# Patient Record
Sex: FEMALE | Race: WHITE | NOT HISPANIC OR LATINO | Employment: FULL TIME | URBAN - METROPOLITAN AREA
[De-identification: names, ages, dates, MRNs, and addresses within clinical notes are randomized per-mention and may not be internally consistent; named-entity substitution may affect disease eponyms.]

---

## 2017-02-02 ENCOUNTER — OFFICE VISIT (OUTPATIENT)
Dept: URGENT CARE | Facility: URGENT CARE | Age: 27
End: 2017-02-02
Payer: COMMERCIAL

## 2017-02-02 ENCOUNTER — VIRTUAL VISIT (OUTPATIENT)
Dept: FAMILY MEDICINE | Facility: OTHER | Age: 27
End: 2017-02-02

## 2017-02-02 VITALS
BODY MASS INDEX: 24.1 KG/M2 | SYSTOLIC BLOOD PRESSURE: 128 MMHG | HEART RATE: 100 BPM | OXYGEN SATURATION: 98 % | HEIGHT: 68 IN | TEMPERATURE: 99.3 F | WEIGHT: 159 LBS | DIASTOLIC BLOOD PRESSURE: 88 MMHG

## 2017-02-02 DIAGNOSIS — R11.2 NON-INTRACTABLE VOMITING WITH NAUSEA, UNSPECIFIED VOMITING TYPE: ICD-10-CM

## 2017-02-02 DIAGNOSIS — A08.4 VIRAL GASTROENTERITIS: Primary | ICD-10-CM

## 2017-02-02 DIAGNOSIS — R07.0 THROAT PAIN: ICD-10-CM

## 2017-02-02 LAB
DEPRECATED S PYO AG THROAT QL EIA: NORMAL
MICRO REPORT STATUS: NORMAL
SPECIMEN SOURCE: NORMAL

## 2017-02-02 PROCEDURE — 99213 OFFICE O/P EST LOW 20 MIN: CPT | Performed by: FAMILY MEDICINE

## 2017-02-02 PROCEDURE — 87081 CULTURE SCREEN ONLY: CPT | Performed by: PHYSICIAN ASSISTANT

## 2017-02-02 PROCEDURE — 87880 STREP A ASSAY W/OPTIC: CPT | Performed by: PHYSICIAN ASSISTANT

## 2017-02-02 RX ORDER — ONDANSETRON 4 MG/1
4-8 TABLET, ORALLY DISINTEGRATING ORAL EVERY 8 HOURS PRN
Qty: 15 TABLET | Refills: 0 | Status: SHIPPED | OUTPATIENT
Start: 2017-02-02 | End: 2018-10-25

## 2017-02-02 NOTE — PROGRESS NOTES
"Date:   Clinician: Trace Gillis  Clinician NPI: [id number]  Patient: Val Gallagher  Patient : 1990  Patient Address: [address] [place] [address] [place] [address]  Patient Phone: [phone]  Visit Protocol: URI  Patient Summary:  Val is a 26 year old ( : 1990 ) female who initiated a Zip for Influenza. When asked the question \"Do you have a [place] primary care physician?\", Val responded \"Yes\".     Her symptoms started suddenly yesterday and consist of dysphagia, chills, sore throat, nausea, loss of appetite, myalgias, malaise, and fever.   She denies vomiting, itchy eyes, hoarse voice, ear pain, dyspnea, post-nasal drainage, rhinitis, facial pain or pressure, petechial or purpuric rash, cough, chest pain, and nasal congestion. She denies a history of facial surgery.   Val has a moderate headache. The headache did not start before her other symptoms and is located on both sides of her head.   She has a mildly painful sore throat. When Val swallows liquids or saliva, she experiences mild pain. The patient denies having white spots on the tonsils similar to a sample strep throat image provided. She has not been exposed to Strep. When asked to feel her neck she denied feeling enlarged lymph nodes. She denies axillary lymphadenopathy.   Her highest temperature was 100.5 degrees Fahrenheit. Her current temperature is 100.0 degrees Fahrenheit. Val has had a fever for 1 - 2 days and used the oral method for measuring her temperature.   She has passed urine in the past 12 hours. She denies rigors.   Val denies having COPD or other chronic lung disease.   Pulse: self-reported pulse rate as: 17 beats in 10 seconds.   Current Temperature (F): 100.0     Weight (in lbs): 153   She states she is not pregnant and denies breastfeeding. She has menstruated in the past month.   Val does not smoke or use smokeless tobacco.   MEDICATIONS:   Patient free text response:  " Trazodone, NuvaRing  , ALLERGIES:  NKDA   Clinician Response:  Dear Val,  Based on the information you have provided, you likely have influenza-like illness. Based on the information you have provided, you likely have acute pharyngitis, or inflammation of your throat.   Unless your are allergic to the over-the-counter medication(s) below, I recommend using:   Acetaminophen (Tylenol), which helps to reduce your discomfort and fever. Take 1-2 pills every 4-6 hours. This is an over-the-counter medication that does not require a prescription.   Ibuprofen. Take 1-3 200mg tablets (200-600mg) every 8 hours to help with the discomfort. Make sure to take the ibuprofen with food. Do not exceed 2400mg in 24 hours. This is an over-the-counter medication that does not require a prescription.   Try the following to help with your throat pain and discomfort:     Use throat lozenges    Gargle with warm salt water (1/4 teaspoon of salt per 8 ounce glass of water).    Suck on frozen items such as Popsicles or ice cubes.     Call 911 or go to the emergency room if you feel that your throat is closing off, you suddenly develop a rash, you are unable to swallow fluids, you are drooling, or you are having difficulty breathing.  Follow up with your primary care clinician if your symptoms are not improving in 3-4 days.   Because your condition is most likely caused by a virus, antibiotics will not help you get better. Inappropriately treating a viral infection with antibiotics may cause harmful side-effects. In fact, antibiotics may make you feel worse.  For more information on why I am not prescribing antibiotics, please watch this video: Antibiotics Won't Help You.   Drink plenty of liquids, especially water and take time to rest your body. This may mean taking a nap or going to bed earlier. Your body is fighting an infection and liquids and rest will improve the pace of recovery. Remember to regularly wash your hands and avoid close  contact with others to prevent spreading your infection.   Diagnosis: Influenza like illness  Diagnosis ICD: J11.1  Diagnosis ICD: 462.0

## 2017-02-02 NOTE — MR AVS SNAPSHOT
After Visit Summary   2/2/2017    Val Gallagher    MRN: [id number]           Patient Information     Date Of Birth          1990        Visit Information        Provider Department      2/2/2017 6:00 PM Aruna Kaminski MD [facility]        Today's Diagnoses     Viral gastroenteritis    -  1     Throat pain         Non-intractable vomiting with nausea, unspecified vomiting type           Care Instructions      Viral Gastroenteritis (Adult)    Gastroenteritis is commonly called the stomach flu. It is most often caused by a virus that affects the stomach and intestinal tract and usually lasts from 2 to 7 days. Common viruses causing gastroenteritis include norovirus, rotavirus, and hepatitis A. Non-viral causes of gastroenteritis include bacteria, parasites, and toxins.  The danger from repeated vomiting or diarrhea is dehydration. This is the loss of too much fluid from the body. When this occurs, body fluids must be replaced. Antibiotics do not help with this illness because it is usually viral.Simple home treatment will be helpful.  Symptoms of viral gastroenteritis may include:    Watery, loose stools    Stomach pain or abdominal cramps    Fever and chills    Nausea and vomiting    Loss of bowel control    Headache  Home care  Gastroenteritis is transmitted by contact with the stool or vomit of an infected person. This can occur from person to person or from contact with a contaminated surface.  Follow these guidelines when caring for yourself at home:    If symptoms are severe, rest at home for the next 24 hours or until you are feeling better.    Wash your hands with soap and water or use alcohol-based  to prevent the spread of infection. Wash your hands after touching anyone who is sick.    Wash your hands or use alcohol-based  after using the toilet and before meals. Clean the toilet after each use.  Remember these tips when preparing  food:    People with diarrhea should not prepare or serve food to others. When preparing foods, wash your hands before and after.    Wash your hands after using cutting boards, countertops, knives, or utensils that have been in contact with raw food.    Keep uncooked meats away from cooked and ready-to-eat foods.  Medicine  You may use acetaminophen or NSAID medicines like ibuprofen or naproxen to control fever unless another medicine was given. If you have chronic liver or kidney disease, talk with your healthcare provider before using these medicines. Also talk with your provider if you've had a stomach ulcer or gastrointestinal bleeding. Don't give aspirin to anyone under 18 years of age who is ill with a fever. It may cause severe liver damage. Don't use NSAIDS is you are already taking one for another condition (like arthritis) or are on aspirin (such as for heart disease or after a stroke).  If medicine for vomiting or diarrhea are prescribed, take these only as directed. Do not take over-the-counter medicines for vomiting or diarrhea unless instructed by your healthcare provider.  Diet  Follow these guidelines for food:    Water and liquids are important so you don't get dehydrated. Drink a small amount at a time or suck on ice chips if you are vomiting.    If you eat, avoid fatty, greasy, spicy, or fried foods.    Don't eat dairy if you have diarrhea. This can make diarrhea worse.    Avoid tobacco, alcohol, and caffeine which may worsen symptoms.  During the first 24 hours (the first full day), follow the diet below:    Beverages. Sports drinks, soft drinks without caffeine, ginger ale, mineral water (plain or flavored), decaffeinated tea and coffee. If you are very dehydrated, sports drinks aren't a good choice. They have too much sugar and not enough electrolytes. In this case, commercially available products called oral rehydration solutions, are best.    Soups. Eat clear broth, consommé, and  bouillon.    Desserts. Eat gelatin, popsicles, and fruit juice bars.  During the next 24 hours (the second day), you may add the following to the above:    Hot cereal, plain toast, bread, rolls, and crackers    Plain noodles, rice, mashed potatoes, chicken noodle or rice soup    Unsweetened canned fruit (avoid pineapple), bananas    Limit fat intake to less than 15 grams per day. Do this by avoiding margarine, butter, oils, mayonnaise, sauces, gravies, fried foods, peanut butter, meat, poultry, and fish.    Limit fiber and avoid raw or cooked vegetables, fresh fruits (except bananas), and bran cereals.    Limit caffeine and chocolate. Don't use spices or seasonings other than salt.    Limit dairy products.    Avoid alcohol.  During the next 24 hours:    Gradually resume a normal diet as you feel better and your symptoms improve.    If at any time it starts getting worse again, go back to clear liquids until you feel better.  Follow-up care  Follow up with your healthcare provider, or as advised. Call your provider if you don't get better within 24 hours or if diarrhea lasts more than a week. Also follow up if you are unable to keep down liquids and get dehydrated. If a stool (diarrhea) sample was taken, call as directed for the results.  Call 911  Call 911 if any of these occur:    Trouble breathing    Chest pain    Confused    Severe drowsiness or trouble awakening    Fainting or loss of consciousness    Rapid heart rate    Seizure    Stiff neck  When to seek medical advice  Call your healthcare provider right away if any of these occur:    Abdominal pain that gets worse    Continued vomiting (unable to keep liquids down)    Frequent diarrhea (more than 5 times a day)    Blood in vomit or stool (black or red color)    Dark urine, reduced urine output, or extreme thirst    Weakness or dizziness    Drowsiness    Fever of 100.4 F (38 C) oral or higher that does not get better with fever medicine    New rash     "[id number] [employer]. [address] [place] [address], [place] [address]. All rights reserved. This information is not intended as a substitute for professional medical care. Always follow your healthcare professional's instructions.              Follow-ups after your visit        Who to contact     If you have questions or need follow up information about today's clinic visit or your schedule please contact [facility] directly at [phone].  Normal or non-critical lab and imaging results will be communicated to you by [employer], letter or phone within 4 business days after the clinic has received the results. If you do not hear from us within 7 days, please contact the clinic through [employer] or phone. If you have a critical or abnormal lab result, we will notify you by phone as soon as possible.  Submit refill requests through [employer] or call your pharmacy and they will forward the refill request to us. Please allow 3 business days for your refill to be completed.          Additional Information About Your Visit        [employer] Information     [employer] gives you secure access to your electronic health record. If you see a primary care provider, you can also send messages to your care team and make appointments. If you have questions, please call your primary care clinic.  If you do not have a primary care provider, please call [phone] and they will assist you.        Care EveryWhere ID     This is your Care EveryWhere ID. This could be used by other organizations to access your [place] medical records  [id number]        Your Vitals Were     Pulse Temperature Height BMI (Body Mass Index) Pulse Oximetry Last Period    100 99.3  F (37.4  C) (Tympanic) 5' 8\" (1.727 m) 24.18 kg/m2 98% 01/05/2017       Blood Pressure from Last 3 Encounters:   02/02/17 128/88   05/10/16 122/82   05/02/16 122/84    Weight from Last 3 Encounters:   02/02/17 159 lb (72.122 kg)   05/10/16 157 lb (71.215 " kg)   05/02/16 155 lb 8 oz (70.534 kg)              We Performed the Following     Beta strep group A culture     Strep, Rapid Screen          Today's Medication Changes          These changes are accurate as of: 2/2/17  7:38 PM.  If you have any questions, ask your nurse or doctor.               Start taking these medicines.        Dose/Directions    ondansetron 4 MG ODT tab   Commonly known as:  ZOFRAN ODT   Used for:  Viral gastroenteritis, Non-intractable vomiting with nausea, unspecified vomiting type   Started by:  Aruna Kaminski MD        Dose:  4-8 mg   Take 1-2 tablets (4-8 mg) by mouth every 8 hours as needed for nausea   Quantity:  15 tablet   Refills:  0            Where to get your medicines      These medications were sent to [facility] [address] [place] [address] AT [address] [address]    Hours:  24-hours Phone:  [phone]    - ondansetron 4 MG ODT tab             Primary Care Provider Office Phone # Fax #    Suzan Montgomery [phone]       [facility] [place] - [address] [address]        Thank you!     Thank you for choosing [facility]  for your care. Our goal is always to provide you with excellent care. Hearing back from our patients is one way we can continue to improve our services. Please take a few minutes to complete the written survey that you may receive in the mail after your visit with us. Thank you!             Your Updated Medication List - Protect others around you: Learn how to safely use, store and throw away your medicines at www.disposemymeds.org.          This list is accurate as of: 2/2/17  7:38 PM.  Always use your most recent med list.                   Brand Name Dispense Instructions for use    amoxicillin-clavulanate 875-125 MG per tablet    AUGMENTIN    20 tablet    Take 1 tablet by mouth 2 times daily        DIPHENHYDRAMINE HCL PO      Take 50 mg by mouth nightly as needed       etonogestrel-ethinyl estradiol 0.12-0.015 MG/24HR vaginal ring    NUVARING    3 each    Place 1 ring every 21 days then remove for 1 week.       fluticasone 50 MCG/ACT spray    FLONASE    16 g    Spray 2 sprays into both nostrils daily       MULTIVITAL PO          naproxen 500 MG tablet    NAPROSYN    30 tablet    Take 1 tablet (500 mg) by mouth 2 times daily as needed for moderate pain       ondansetron 4 MG ODT tab    ZOFRAN ODT    15 tablet    Take 1-2 tablets (4-8 mg) by mouth every 8 hours as needed for nausea       traZODone 50 MG tablet    DESYREL    30 tablet    Take 1 tablet (50 mg) by mouth At Bedtime

## 2017-02-02 NOTE — Clinical Note
[facility]   [address] [place] [address]  [id number]  [phone]      February 2, 2017      To Whom It May Concern,    Val NADIRA Gallagher was seen tonight at [facility].  She was not able to attend work today nor will be able to attend work tomorrow secondary to a medical illness.  Thanks.       Sincerely,      Aruna Swain MD

## 2017-02-03 NOTE — PATIENT INSTRUCTIONS
Viral Gastroenteritis (Adult)    Gastroenteritis is commonly called the stomach flu. It is most often caused by a virus that affects the stomach and intestinal tract and usually lasts from 2 to 7 days. Common viruses causing gastroenteritis include norovirus, rotavirus, and hepatitis A. Non-viral causes of gastroenteritis include bacteria, parasites, and toxins.  The danger from repeated vomiting or diarrhea is dehydration. This is the loss of too much fluid from the body. When this occurs, body fluids must be replaced. Antibiotics do not help with this illness because it is usually viral.Simple home treatment will be helpful.  Symptoms of viral gastroenteritis may include:    Watery, loose stools    Stomach pain or abdominal cramps    Fever and chills    Nausea and vomiting    Loss of bowel control    Headache  Home care  Gastroenteritis is transmitted by contact with the stool or vomit of an infected person. This can occur from person to person or from contact with a contaminated surface.  Follow these guidelines when caring for yourself at home:    If symptoms are severe, rest at home for the next 24 hours or until you are feeling better.    Wash your hands with soap and water or use alcohol-based  to prevent the spread of infection. Wash your hands after touching anyone who is sick.    Wash your hands or use alcohol-based  after using the toilet and before meals. Clean the toilet after each use.  Remember these tips when preparing food:    People with diarrhea should not prepare or serve food to others. When preparing foods, wash your hands before and after.    Wash your hands after using cutting boards, countertops, knives, or utensils that have been in contact with raw food.    Keep uncooked meats away from cooked and ready-to-eat foods.  Medicine  You may use acetaminophen or NSAID medicines like ibuprofen or naproxen to control fever unless another medicine was given. If you have chronic  liver or kidney disease, talk with your healthcare provider before using these medicines. Also talk with your provider if you've had a stomach ulcer or gastrointestinal bleeding. Don't give aspirin to anyone under 18 years of age who is ill with a fever. It may cause severe liver damage. Don't use NSAIDS is you are already taking one for another condition (like arthritis) or are on aspirin (such as for heart disease or after a stroke).  If medicine for vomiting or diarrhea are prescribed, take these only as directed. Do not take over-the-counter medicines for vomiting or diarrhea unless instructed by your healthcare provider.  Diet  Follow these guidelines for food:    Water and liquids are important so you don't get dehydrated. Drink a small amount at a time or suck on ice chips if you are vomiting.    If you eat, avoid fatty, greasy, spicy, or fried foods.    Don't eat dairy if you have diarrhea. This can make diarrhea worse.    Avoid tobacco, alcohol, and caffeine which may worsen symptoms.  During the first 24 hours (the first full day), follow the diet below:    Beverages. Sports drinks, soft drinks without caffeine, ginger ale, mineral water (plain or flavored), decaffeinated tea and coffee. If you are very dehydrated, sports drinks aren't a good choice. They have too much sugar and not enough electrolytes. In this case, commercially available products called oral rehydration solutions, are best.    Soups. Eat clear broth, consommé, and bouillon.    Desserts. Eat gelatin, popsicles, and fruit juice bars.  During the next 24 hours (the second day), you may add the following to the above:    Hot cereal, plain toast, bread, rolls, and crackers    Plain noodles, rice, mashed potatoes, chicken noodle or rice soup    Unsweetened canned fruit (avoid pineapple), bananas    Limit fat intake to less than 15 grams per day. Do this by avoiding margarine, butter, oils, mayonnaise, sauces, gravies, fried foods, peanut  butter, meat, poultry, and fish.    Limit fiber and avoid raw or cooked vegetables, fresh fruits (except bananas), and bran cereals.    Limit caffeine and chocolate. Don't use spices or seasonings other than salt.    Limit dairy products.    Avoid alcohol.  During the next 24 hours:    Gradually resume a normal diet as you feel better and your symptoms improve.    If at any time it starts getting worse again, go back to clear liquids until you feel better.  Follow-up care  Follow up with your healthcare provider, or as advised. Call your provider if you don't get better within 24 hours or if diarrhea lasts more than a week. Also follow up if you are unable to keep down liquids and get dehydrated. If a stool (diarrhea) sample was taken, call as directed for the results.  Call 911  Call 911 if any of these occur:    Trouble breathing    Chest pain    Confused    Severe drowsiness or trouble awakening    Fainting or loss of consciousness    Rapid heart rate    Seizure    Stiff neck  When to seek medical advice  Call your healthcare provider right away if any of these occur:    Abdominal pain that gets worse    Continued vomiting (unable to keep liquids down)    Frequent diarrhea (more than 5 times a day)    Blood in vomit or stool (black or red color)    Dark urine, reduced urine output, or extreme thirst    Weakness or dizziness    Drowsiness    Fever of 100.4 F (38 C) oral or higher that does not get better with fever medicine    New rash    4779-6912 [employer]. [address] [place] [address], [place] [address]. All rights reserved. This information is not intended as a substitute for professional medical care. Always follow your healthcare professional's instructions.

## 2017-02-03 NOTE — PROGRESS NOTES
"(S) Val Gallagher is a 26 year old female with complaint of gastrointestinal symptoms of anorexia, cramping, nausea, vomiting and fever starting yesterday.  Threw up once today.  Normal stools. No blood in stool or emesis.  No recent travels.  No ill contacts.  No recent antibiotics.  No consumption of suspicious foods.  Normal UO.  No abnormal vaginal discharge.  Has been eating ramen noodles and drinking gatorade.  Tmax yesterday was 101.  No h/o abdominal surgeries.    (O) /88 mmHg  Pulse 100  Temp(Src) 99.3  F (37.4  C) (Tympanic)  Ht 5' 8\" (1.727 m)  Wt 159 lb (72.122 kg)  BMI 24.18 kg/m2  SpO2 98%  LMP 01/05/2017   Physical exam reveals the patient appears pale but in NAD. Hydration status: mildly dehydrated. OP is otherwise normal.  Nose: normal. Ears: normal TMs.  Neck: supple, no adenopathy.  Lungs: CTA B.  CV: RRR, normal S1, S2, no murmurs.  Abdomen: +BS, mild tenderness in the LUQ area, no rebound tenderness, no guarding or rigidity, no CVA tenderness.    Labs:   Results for orders placed or performed in visit on 02/02/17   Strep, Rapid Screen   Result Value Ref Range    Specimen Description Throat     Rapid Strep A Screen       NEGATIVE: No Group A streptococcal antigen detected by immunoassay, await   culture report.      Micro Report Status FINAL 02/02/2017       (A) Viral Gastroenteritis and Nausea and vomiting    (P) I have recommended small amounts clear fluids frequently, dilute gatorade, soups, juices, water, BRAT diet and advance diet as tolerated. Pepto-Bismol prn.  Zofran as per orders.  Return office visit if symptoms persist or worsen; I have alerted the patient to call if high fever, dehydration, marked weakness, fainting, increased abdominal pain, blood in stool or vomit.  Aruna Swain MD   "

## 2017-02-03 NOTE — NURSING NOTE
"Chief Complaint   Patient presents with     Urgent Care     Pt in clinic c/o fever, chills, nausea, vomiting, and abdominal pain.     Fever     Sweats     Nausea     Vomiting     Abdominal Pain       Initial /88 mmHg  Pulse 100  Temp(Src) 99.3  F (37.4  C) (Tympanic)  Ht 5' 8\" (1.727 m)  Wt 159 lb (72.122 kg)  BMI 24.18 kg/m2  SpO2 98%  LMP 01/05/2017 Estimated body mass index is 24.18 kg/(m^2) as calculated from the following:    Height as of this encounter: 5' 8\" (1.727 m).    Weight as of this encounter: 159 lb (72.122 kg).  BP completed using cuff size: regular  Tracey Grande/ MA    "

## 2017-02-04 LAB
BACTERIA SPEC CULT: NORMAL
MICRO REPORT STATUS: NORMAL
SPECIMEN SOURCE: NORMAL

## 2017-04-02 ENCOUNTER — OFFICE VISIT (OUTPATIENT)
Dept: URGENT CARE | Facility: URGENT CARE | Age: 27
End: 2017-04-02
Payer: COMMERCIAL

## 2017-04-02 VITALS
HEIGHT: 68 IN | WEIGHT: 155 LBS | HEART RATE: 104 BPM | TEMPERATURE: 98.7 F | DIASTOLIC BLOOD PRESSURE: 76 MMHG | BODY MASS INDEX: 23.49 KG/M2 | OXYGEN SATURATION: 98 % | SYSTOLIC BLOOD PRESSURE: 102 MMHG

## 2017-04-02 DIAGNOSIS — M79.622 PAIN OF LEFT UPPER ARM: Primary | ICD-10-CM

## 2017-04-02 PROCEDURE — 99213 OFFICE O/P EST LOW 20 MIN: CPT | Performed by: PHYSICIAN ASSISTANT

## 2017-04-02 PROCEDURE — 93000 ELECTROCARDIOGRAM COMPLETE: CPT | Performed by: PHYSICIAN ASSISTANT

## 2017-04-02 ASSESSMENT — ENCOUNTER SYMPTOMS
DIARRHEA: 0
HEADACHES: 0
ABDOMINAL PAIN: 0
SHORTNESS OF BREATH: 0
CHILLS: 0
NAUSEA: 0
FOCAL WEAKNESS: 0
VOMITING: 0
FEVER: 0

## 2017-04-02 NOTE — MR AVS SNAPSHOT
After Visit Summary   4/2/2017    Val Gallagher    MRN: [id number]           Patient Information     Date Of Birth          1990        Visit Information        Provider Department      4/2/2017 7:30 PM Lena Gallo PA-C [facility]        Today's Diagnoses     Pain of left upper arm    -  1       Follow-ups after your visit        Follow-up notes from your care team     Return if symptoms worsen or fail to improve.      Your next 10 appointments already scheduled     Apr 06, 2017  9:00 AM CDT   [facility] with Gaby Moscoso MD   [facility] ([facility])    [address] [place] [id number] [phone]              Who to contact     If you have questions or need follow up information about today's clinic visit or your schedule please contact [facility] directly at [phone].  Normal or non-critical lab and imaging results will be communicated to you by [employer], letter or phone within 4 business days after the clinic has received the results. If you do not hear from us within 7 days, please contact the clinic through [employer] or phone. If you have a critical or abnormal lab result, we will notify you by phone as soon as possible.  Submit refill requests through [employer] or call your pharmacy and they will forward the refill request to us. Please allow 3 business days for your refill to be completed.          Additional Information About Your Visit        MyChart Information     Y[employer]all gives you secure access to your electronic health record. If you see a primary care provider, you can also send messages to your care team and make appointments. If you have questions, please call your primary care clinic.  If you do not have a primary care provider, please call [phone] and they will assist you.        Care EveryWhere ID     This is your Care EveryWhere ID. This could be  "used by other organizations to access your [facility] medical records  [id number]        Your Vitals Were     Pulse Temperature Height Pulse Oximetry BMI (Body Mass Index)       104 98.7  F (37.1  C) (Oral) 5' 8\" (1.727 m) 98% 23.57 kg/m2        Blood Pressure from Last 3 Encounters:   04/02/17 102/76   02/02/17 128/88   05/10/16 122/82    Weight from Last 3 Encounters:   04/02/17 155 lb (70.3 kg)   02/02/17 159 lb (72.1 kg)   05/10/16 157 lb (71.2 kg)              We Performed the Following     EKG 12-lead complete w/read - Clinics          Today's Medication Changes          These changes are accurate as of: 4/2/17  8:41 PM.  If you have any questions, ask your nurse or doctor.               Stop taking these medicines if you haven't already. Please contact your care team if you have questions.     amoxicillin-clavulanate 875-125 MG per tablet   Commonly known as:  AUGMENTIN   Stopped by:  Lean Gallo PA-C                    Primary Care Provider Office Phone # Fax #    Suzan Montgomery [phone]       [place] [id number]        Thank you!     Thank you for choosing [facility]  for your care. Our goal is always to provide you with excellent care. Hearing back from our patients is one way we can continue to improve our services. Please take a few minutes to complete the written survey that you may receive in the mail after your visit with us. Thank you!             Your Updated Medication List - Protect others around you: Learn how to safely use, store and throw away your medicines at www.disposemymeds.org.          This list is accurate as of: 4/2/17  8:41 PM.  Always use your most recent med list.                   Brand Name Dispense Instructions for use    DIPHENHYDRAMINE HCL PO      Take 50 mg by mouth nightly as needed       etonogestrel-ethinyl estradiol 0.12-0.015 MG/24HR vaginal ring    NUVARING    3 each    Place 1 " ring every 21 days then remove for 1 week.       fluticasone 50 MCG/ACT spray    FLONASE    16 g    Spray 2 sprays into both nostrils daily       MULTIVITAL PO          naproxen 500 MG tablet    NAPROSYN    30 tablet    Take 1 tablet (500 mg) by mouth 2 times daily as needed for moderate pain       ondansetron 4 MG ODT tab    ZOFRAN ODT    15 tablet    Take 1-2 tablets (4-8 mg) by mouth every 8 hours as needed for nausea       traZODone 50 MG tablet    DESYREL    30 tablet    Take 1 tablet (50 mg) by mouth At Bedtime

## 2017-04-03 NOTE — NURSING NOTE
"Chief Complaint   Patient presents with     Urgent Care     Pain     left sided breast pain started 28 hours ago, radiates up into shoulder. Comes and goes,        Initial /76  Pulse 104  Temp 98.7  F (37.1  C) (Oral)  Ht 5' 8\" (1.727 m)  Wt 155 lb (70.3 kg)  SpO2 98%  BMI 23.57 kg/m2 Estimated body mass index is 23.57 kg/(m^2) as calculated from the following:    Height as of this encounter: 5' 8\" (1.727 m).    Weight as of this encounter: 155 lb (70.3 kg).  Medication Reconciliation: complete  "

## 2017-04-03 NOTE — PROGRESS NOTES
HPI  April 2, 2017    HPI: Val Gallagher is a 26 year old female who complains of moderate intermittent L sided breast pain which radiates into L axilla and L upper arm onset yesterday. Pain occurs once every few hours and lasts approx 30 seconds. Pain is sharp in nature. No known aggravating or alleviating factors. No injury or trauma. No treatments tried. Denies fever/chills, palpitations, cough, congestion, urinary sx, constipation, nipple bleeding/discharge, HA, CP, SOB, abd pain, N/V/D, numbness, rash, or any other symptoms.     Past Medical History:   Diagnosis Date     CARDIOVASCULAR SCREENING; LDL GOAL LESS THAN 160      LSIL (low grade squamous intraepithelial lesion) on Pap smear 8/2013    age 22     Other kyphoscoliosis and scoliosis     thoracolumbar     Past Surgical History:   Procedure Laterality Date     NO HISTORY OF SURGERY       Social History   Substance Use Topics     Smoking status: Never Smoker     Smokeless tobacco: Never Used     Alcohol use 0.0 oz/week     0 Standard drinks or equivalent per week      Comment:  1 to 4 max per week       Problem list, Medication list, Allergies, and Medical/Social/Surgical histories reviewed in [facility] and updated as appropriate.      Review of Systems   Constitutional: Negative for chills and fever.   Respiratory: Negative for shortness of breath.    Cardiovascular: Negative for chest pain.   Gastrointestinal: Negative for abdominal pain, diarrhea, nausea and vomiting.   Musculoskeletal:        L breast/axilla/upper arm pain   Skin: Negative for rash.   Neurological: Negative for focal weakness and headaches.   All other systems reviewed and are negative.        Physical Exam   Constitutional: She is oriented to person, place, and time and well-developed, well-nourished, and in no distress.   HENT:   Head: Normocephalic and atraumatic.   Cardiovascular: Normal rate, regular rhythm and normal heart sounds.    Pulmonary/Chest: Effort normal and breath sounds  "normal. She exhibits no tenderness. Left breast exhibits no mass, no nipple discharge, no skin change and no tenderness.   Musculoskeletal: Normal range of motion.        Left shoulder: Normal.   Neurological: She is alert and oriented to person, place, and time. Gait normal.   Skin: Skin is warm and dry.   Nursing note and vitals reviewed.      Vital Signs  /76  Pulse 104  Temp 98.7  F (37.1  C) (Oral)  Ht 5' 8\" (1.727 m)  Wt 155 lb (70.3 kg)  SpO2 98%  BMI 23.57 kg/m2     Diagnostic Test Results:  none   EKG - appears normal, NSR, normal axis, normal intervals, no acute ST/T changes c/w ischemia, no LVH by voltage criteria, unchanged from previous tracings    ASSESSMENT/PLAN      ICD-10-CM    1. Pain of left upper arm M79.622 EKG 12-lead complete w/read - Clinics      Lungs CTAB, no tenderness on exam. No SOB. EKG normal. Unknown etiology for pain but no signs of acute cardiac or respiratory cause. F/u with PCP tomorrow.      I have discussed any lab or imaging results, the patient's diagnosis, and my plan of treatment with the patient and/or family. Patient is aware to come back in if with worsening symptoms or if no relief despite treatment plan.  Patient voiced understanding and had no further questions.       Follow Up: Return if symptoms worsen or fail to improve.    CHIQUIS Trujillo, PANavdeepC  [facility]      "

## 2017-05-02 ENCOUNTER — OFFICE VISIT (OUTPATIENT)
Dept: FAMILY MEDICINE | Facility: CLINIC | Age: 27
End: 2017-05-02
Payer: COMMERCIAL

## 2017-05-02 VITALS
RESPIRATION RATE: 12 BRPM | BODY MASS INDEX: 32.54 KG/M2 | OXYGEN SATURATION: 99 % | HEART RATE: 80 BPM | DIASTOLIC BLOOD PRESSURE: 72 MMHG | SYSTOLIC BLOOD PRESSURE: 124 MMHG | HEIGHT: 58 IN | WEIGHT: 155 LBS | TEMPERATURE: 98.3 F

## 2017-05-02 DIAGNOSIS — Z11.3 SCREENING EXAMINATION FOR VENEREAL DISEASE: ICD-10-CM

## 2017-05-02 DIAGNOSIS — N94.6 DYSMENORRHEA: ICD-10-CM

## 2017-05-02 DIAGNOSIS — G47.00 INSOMNIA, UNSPECIFIED TYPE: ICD-10-CM

## 2017-05-02 DIAGNOSIS — Z00.00 ROUTINE GENERAL MEDICAL EXAMINATION AT A HEALTH CARE FACILITY: Primary | ICD-10-CM

## 2017-05-02 DIAGNOSIS — Z12.4 SCREENING FOR MALIGNANT NEOPLASM OF CERVIX: ICD-10-CM

## 2017-05-02 PROCEDURE — 99395 PREV VISIT EST AGE 18-39: CPT | Performed by: FAMILY MEDICINE

## 2017-05-02 PROCEDURE — 87491 CHLMYD TRACH DNA AMP PROBE: CPT | Performed by: FAMILY MEDICINE

## 2017-05-02 PROCEDURE — 87591 N.GONORRHOEAE DNA AMP PROB: CPT | Performed by: FAMILY MEDICINE

## 2017-05-02 PROCEDURE — G0145 SCR C/V CYTO,THINLAYER,RESCR: HCPCS | Performed by: FAMILY MEDICINE

## 2017-05-02 RX ORDER — ETONOGESTREL AND ETHINYL ESTRADIOL VAGINAL RING .015; .12 MG/D; MG/D
RING VAGINAL
Qty: 3 EACH | Refills: 3 | Status: SHIPPED | OUTPATIENT
Start: 2017-05-02 | End: 2018-04-23

## 2017-05-02 RX ORDER — TRAZODONE HYDROCHLORIDE 50 MG/1
50 TABLET, FILM COATED ORAL AT BEDTIME
Qty: 30 TABLET | Refills: 3 | Status: SHIPPED | OUTPATIENT
Start: 2017-05-02 | End: 2017-12-04

## 2017-05-02 NOTE — NURSING NOTE
"Chief Complaint   Patient presents with     Physical     with PAP       Initial /72 (BP Location: Left arm, Patient Position: Chair, Cuff Size: Adult Regular)  Pulse 80  Temp 98.3  F (36.8  C) (Oral)  Resp 12  Ht 4' 9.5\" (1.461 m)  Wt 155 lb (70.3 kg)  LMP 05/01/2017 (Exact Date)  SpO2 99%  BMI 32.96 kg/m2 Estimated body mass index is 32.96 kg/(m^2) as calculated from the following:    Height as of this encounter: 4' 9.5\" (1.461 m).    Weight as of this encounter: 155 lb (70.3 kg).  Medication Reconciliation: complete     Danica Eng MA      "

## 2017-05-02 NOTE — PROGRESS NOTES
SUBJECTIVE:     CC: Val Gallagher is a 26 year old woman who presents for preventive health visit.     Physical   Annual:     Getting at least 3 servings of Calcium per day::  Yes    Bi-annual eye exam::  NO    Dental care twice a year::  Yes    Sleep apnea or symptoms of sleep apnea::  None    Diet::  Carbohydrate counting    Frequency of exercise::  2-3 days/week    Duration of exercise::  15-30 minutes    Taking medications regularly::  Yes    Medication side effects::  None    Additional concerns today::  YES    Chest pain: About a month ago, Val went to the  due to a pain behind the right breast which radiates to the back. The pain is sharp and comes suddenly, about three times a week, and in brief episodes. There's no specific trigger, no known alleviating factors. Thought to be musculoskeletal at the  visit. Reports normal EKG there. Denies exertional symptoms.     Anxiety: Val is working on her master's and her computer crashed during finals. She also has nightmares about her old job at a long-term which wake her up. She takes trazadone to sleep, taking 25 mg per night. She wonders if she can take 50mg nightly.     She denies concern about STI. She has been with her partner for 4 years. Does need nuvaring refill. Tolerating it well. No spotting between periods.     Today's PHQ-2 Score:   PHQ-2 ( 1999 Pfizer) 5/1/2017   Little interest or pleasure in doing things Not at all   Feeling down, depressed or hopeless Not at all   PHQ-2 Score 0       Abuse: Current or Past(Physical, Sexual or Emotional)- No  Do you feel safe in your environment - Yes    Social History   Substance Use Topics     Smoking status: Never Smoker     Smokeless tobacco: Never Used     Alcohol use 0.0 oz/week     0 Standard drinks or equivalent per week      Comment:  1 to 4 max per week     The patient does not drink >3 drinks per day nor >7 drinks per week.    Recent Labs   Lab Test  07/31/09   1114   CHOL  170   HDL  50   LDL   111   TRIG  45   [facility]       Reviewed orders with patient.  Reviewed health maintenance and updated orders accordingly - Yes    Mammo Decision Support:  Mammogram not appropriate for this patient based on age.    Pertinent mammograms are reviewed under the imaging tab.  History of abnormal Pap smear:   YES - other categories - see link Cervical Cytology Screening Guidelines  Last 3 Pap Results:   PAP (no units)   Date Value   2016 NIL   2013 LSIL (A)     Reviewed and updated as needed this visit by clinical staff  Tobacco  Allergies  Med Hx  Surg Hx  Fam Hx  Soc Hx        Reviewed and updated as needed this visit by Provider        Past Medical History:   Diagnosis Date     CARDIOVASCULAR SCREENING; LDL GOAL LESS THAN 160      LSIL (low grade squamous intraepithelial lesion) on Pap smear 2013    age 22     Other kyphoscoliosis and scoliosis     thoracolumbar      Past Surgical History:   Procedure Laterality Date     NO HISTORY OF SURGERY       Obstetric History       T0      TAB0   SAB0   E0   M0   L0           ROS:  10 point ROS is negative except as noted above.       BP Readings from Last 3 Encounters:   17 124/72   17 102/76   17 128/88    Wt Readings from Last 3 Encounters:   17 70.3 kg (155 lb)   17 70.3 kg (155 lb)   17 72.1 kg (159 lb)         Patient Active Problem List   Diagnosis     Scoliosis (and kyphoscoliosis), idiopathic     Dysmenorrhea      frequent menses     CARDIOVASCULAR SCREENING; LDL GOAL LESS THAN 160     LSIL (low grade squamous intraepithelial lesion) on Pap smear     Past Surgical History:   Procedure Laterality Date     NO HISTORY OF SURGERY         Social History   Substance Use Topics     Smoking status: Never Smoker     Smokeless tobacco: Never Used     Alcohol use 0.0 oz/week     0 Standard drinks or equivalent per week      Comment:  1 to 4 max per week     Family History   Problem Relation Age of  "Onset     Family History Negative Mother      Sleep Disorder Mother      Obesity Father      DIABETES Paternal Grandmother          Current Outpatient Prescriptions   Medication Sig Dispense Refill     traZODone (DESYREL) 50 MG tablet Take 1 tablet (50 mg) by mouth At Bedtime 30 tablet 3     etonogestrel-ethinyl estradiol (NUVARING) 0.12-0.015 MG/24HR vaginal ring Place 1 ring every 21 days then remove for 1 week. 3 each 3     ondansetron (ZOFRAN ODT) 4 MG ODT tab Take 1-2 tablets (4-8 mg) by mouth every 8 hours as needed for nausea (Patient not taking: Reported on 5/2/2017) 15 tablet 0     naproxen (NAPROSYN) 500 MG tablet Take 1 tablet (500 mg) by mouth 2 times daily as needed for moderate pain (Patient not taking: Reported on 5/2/2017) 30 tablet 1     fluticasone (FLONASE) 50 MCG/ACT nasal spray Spray 2 sprays into both nostrils daily (Patient not taking: Reported on 5/2/2017) 16 g 3     DIPHENHYDRAMINE HCL PO Take 50 mg by mouth nightly as needed Reported on 5/2/2017       [DISCONTINUED] etonogestrel-ethinyl estradiol (NUVARING) 0.12-0.015 MG/24HR vaginal ring Place 1 ring every 21 days then remove for 1 week. 3 each 3     [DISCONTINUED] traZODone (DESYREL) 50 MG tablet Take 1 tablet (50 mg) by mouth At Bedtime 30 tablet 5     Multiple Vitamins-Minerals (MULTIVITAL PO) Reported on 5/2/2017       No Known Allergies  Recent Labs   Lab Test  07/31/09   1114   LDL  111   HDL  50   TRIG  45      This document serves as a record of the services and decisions personally performed and made by Gaby Moscoso MD. It was created on her behalf by Isela Mallory, a trained medical scribe. The creation of this document is based on the provider's statements to the medical scribe.  Isela Mallory May 2, 2017 8:38 AM    OBJECTIVE:     /72 (BP Location: Left arm, Patient Position: Chair, Cuff Size: Adult Regular)  Pulse 80  Temp 98.3  F (36.8  C) (Oral)  Resp 12  Ht 1.461 m (4' 9.5\")  Wt 70.3 kg (155 lb)  LMP " 05/01/2017 (Exact Date)  SpO2 99%  BMI 32.96 kg/m2  EXAM:  GENERAL: healthy, alert and no distress  EYES: Eyes grossly normal to inspection, PERRL and conjunctivae and sclerae normal  HENT: ear canals and TM's normal, nose and mouth without ulcers or lesions  NECK: no adenopathy, no asymmetry, masses, or scars and thyroid normal to palpation  RESP: lungs clear to auscultation - no rales, rhonchi or wheezes  BREAST: normal without masses, tenderness or nipple discharge and no palpable axillary masses or adenopathy  CV: regular rate and rhythm, normal S1 S2, no S3 or S4, no murmur, click or rub, no peripheral edema and peripheral pulses strong  ABDOMEN: soft, nontender, no hepatosplenomegaly, no masses and bowel sounds normal   (female): normal female external genitalia, normal urethral meatus, vaginal mucosa pink, moist, well rugated, and normal cervix/adnexa/uterus without masses or discharge  MS: no gross musculoskeletal defects noted, no edema  SKIN: no suspicious lesions or rashes  NEURO: Normal strength and tone, mentation intact and speech normal  PSYCH: mentation appears normal, affect normal/bright  LYMPH: no cervical, supraclavicular, axillary, or inguinal adenopathy    ASSESSMENT/PLAN:     1. Routine general medical examination at a health care facility  Pap today     2. Screening examination for venereal disease     - NEISSERIA GONORRHOEA PCR  - CHLAMYDIA TRACHOMATIS PCR    3. Screening for malignant neoplasm of cervix     - Pap imaged thin layer screen reflex to HPV if ASCUS - recommend age 25 - 29    4. Dysmenorrhea   doing well on nuvaring  - etonogestrel-ethinyl estradiol (NUVARING) 0.12-0.015 MG/24HR vaginal ring; Place 1 ring every 21 days then remove for 1 week.  Dispense: 3 each; Refill: 3    5. Insomnia, unspecified type  She can increase her dose of trazodone to 50mg nightly (which is what the rx was written for previously as well). Suggested trying meditation technique such as progressive  "muscle relaxation for middle of the night awakenings.   - traZODone (DESYREL) 50 MG tablet; Take 1 tablet (50 mg) by mouth At Bedtime  Dispense: 30 tablet; Refill: 3    COUNSELING:  Reviewed preventive health counseling, as reflected in patient instructions     reports that she has never smoked. She has never used smokeless tobacco.    Estimated body mass index is 32.96 kg/(m^2) as calculated from the following:    Height as of this encounter: 1.461 m (4' 9.5\").    Weight as of this encounter: 70.3 kg (155 lb).       Counseling Resources:  ATP IV Guidelines  Pooled Cohorts Equation Calculator  Breast Cancer Risk Calculator  FRAX Risk Assessment  ICSI Preventive Guidelines  Dietary Guidelines for Americans, 2010  [employer]'s MyPlate  ASA Prophylaxis  Lung CA Screening    The information in this document, created by the medical scribe for me, accurately reflects the services I personally performed and the decisions made by me. I have reviewed and approved this document for accuracy prior to leaving the patient care area.  5/2/2017 8:37 AM         Gaby Moscoso MD  [facility]  Answers for HPI/ROS submitted by the patient on 5/1/2017   Q1: Little interest or pleasure in doing things: 0=Not at all  Q2: Feeling down, depressed or hopeless: 0=Not at all  PHQ-2 Score: 0    "

## 2017-05-02 NOTE — MR AVS SNAPSHOT
After Visit Summary   5/2/2017    Val Gallagher    MRN: [id number]           Patient Information     Date Of Birth          1990        Visit Information        Provider Department      5/2/2017 8:00 AM Gaby Moscoso MD [facility]        Today's Diagnoses     Routine general medical examination at a health care facility    -  1    Screening examination for venereal disease        Screening for malignant neoplasm of cervix        Dysmenorrhea        Insomnia, unspecified type          Care Instructions      Preventive Health Recommendations  Female Ages 18 to 25     Yearly exam:     See your health care provider every year in order to  o Review health changes.   o Discuss preventive care.    o Review your medicines if your doctor has prescribed any.      You should be tested each year for STDs (sexually transmitted diseases).       After age 20, talk to your provider about how often you should have cholesterol testing.      Starting at age 21, get a Pap test every three years. If you have an abnormal result, your doctor may have you test more often.      If you are at risk for diabetes, you should have a diabetes test (fasting glucose).     Shots:     Get a flu shot each year.     Get a tetanus shot every 10 years.     Consider getting the shot (vaccine) that prevents cervical cancer (Gardasil).    Nutrition:     Eat at least 5 servings of fruits and vegetables each day.    Eat whole-grain bread, whole-wheat pasta and brown rice instead of white grains and rice.    Talk to your provider about Calcium and Vitamin D.     Lifestyle    Exercise at least 150 minutes a week each week (30 minutes a day, 5 days a week). This will help you control your weight and prevent disease.    Limit alcohol to one drink per day.    No smoking.     Wear sunscreen to prevent skin cancer.    See your dentist every six months for an exam and cleaning.        Follow-ups after your visit        Who to  "contact     If you have questions or need follow up information about today's clinic visit or your schedule please contact [facility] directly at [phone].  Normal or non-critical lab and imaging results will be communicated to you by MyChart, letter or phone within 4 business days after the clinic has received the results. If you do not hear from us within 7 days, please contact the clinic through [employer] or phone. If you have a critical or abnormal lab result, we will notify you by phone as soon as possible.  Submit refill requests through [employer] or call your pharmacy and they will forward the refill request to us. Please allow 3 business days for your refill to be completed.          Additional Information About Your Visit        [employer] Information     [employer] gives you secure access to your electronic health record. If you see a primary care provider, you can also send messages to your care team and make appointments. If you have questions, please call your primary care clinic.  If you do not have a primary care provider, please call [phone] and they will assist you.        Care EveryWhere ID     This is your Care EveryWhere ID. This could be used by other organizations to access your [place] medical records  [id number]        Your Vitals Were     Pulse Temperature Respirations Height Last Period Pulse Oximetry    80 98.3  F (36.8  C) (Oral) 12 4' 9.5\" (1.461 m) 05/01/2017 (Exact Date) 99%    BMI (Body Mass Index)                   32.96 kg/m2            Blood Pressure from Last 3 Encounters:   05/02/17 124/72   04/02/17 102/76   02/02/17 128/88    Weight from Last 3 Encounters:   05/02/17 155 lb (70.3 kg)   04/02/17 155 lb (70.3 kg)   02/02/17 159 lb (72.1 kg)              We Performed the Following     CHLAMYDIA TRACHOMATIS PCR     NEISSERIA GONORRHOEA PCR     Pap imaged thin layer screen reflex to HPV if ASCUS - recommend age 25 - 29          Where to get your medicines      These " medications were sent to [place], [place] - [address]  [address] [facility] [address]     Phone:  [phone]     etonogestrel-ethinyl estradiol 0.12-0.015 MG/24HR vaginal ring    traZODone 50 MG tablet          Primary Care Provider Office Phone # Fax #    Suzan Montgomery [phone]       [facility] [address]  [facility] [address]        Thank you!     Thank you for choosing [facility]  for your care. Our goal is always to provide you with excellent care. Hearing back from our patients is one way we can continue to improve our services. Please take a few minutes to complete the written survey that you may receive in the mail after your visit with us. Thank you!             Your Updated Medication List - Protect others around you: Learn how to safely use, store and throw away your medicines at www.disposemymeds.org.          This list is accurate as of: 5/2/17  8:31 AM.  Always use your most recent med list.                   Brand Name Dispense Instructions for use    DIPHENHYDRAMINE HCL PO      Take 50 mg by mouth nightly as needed Reported on 5/2/2017       etonogestrel-ethinyl estradiol 0.12-0.015 MG/24HR vaginal ring    NUVARING    3 each    Place 1 ring every 21 days then remove for 1 week.       fluticasone 50 MCG/ACT spray    FLONASE    16 g    Spray 2 sprays into both nostrils daily       MULTIVITAL PO      Reported on 5/2/2017       naproxen 500 MG tablet    NAPROSYN    30 tablet    Take 1 tablet (500 mg) by mouth 2 times daily as needed for moderate pain       ondansetron 4 MG ODT tab    ZOFRAN ODT    15 tablet    Take 1-2 tablets (4-8 mg) by mouth every 8 hours as needed for nausea       traZODone 50 MG tablet    DESYREL    30 tablet    Take 1 tablet (50 mg) by mouth At Bedtime

## 2017-05-03 LAB
C TRACH DNA SPEC QL NAA+PROBE: NORMAL
COPATH REPORT: NORMAL
N GONORRHOEA DNA SPEC QL NAA+PROBE: NORMAL
PAP: NORMAL
SPECIMEN SOURCE: NORMAL
SPECIMEN SOURCE: NORMAL

## 2017-05-04 NOTE — PROGRESS NOTES
Excellent! Please call or sent a [employer] message if you have any questions. Gaby Moscoso M.D.

## 2017-06-21 ENCOUNTER — OFFICE VISIT (OUTPATIENT)
Dept: FAMILY MEDICINE | Facility: CLINIC | Age: 27
End: 2017-06-21
Payer: COMMERCIAL

## 2017-06-21 VITALS
OXYGEN SATURATION: 99 % | WEIGHT: 153 LBS | DIASTOLIC BLOOD PRESSURE: 78 MMHG | RESPIRATION RATE: 16 BRPM | TEMPERATURE: 98.7 F | SYSTOLIC BLOOD PRESSURE: 122 MMHG | HEART RATE: 93 BPM | BODY MASS INDEX: 32.54 KG/M2

## 2017-06-21 DIAGNOSIS — Z13.220 SCREENING FOR LIPID DISORDERS: ICD-10-CM

## 2017-06-21 DIAGNOSIS — Z13.1 SCREENING FOR DIABETES MELLITUS: ICD-10-CM

## 2017-06-21 DIAGNOSIS — R07.89 ATYPICAL CHEST PAIN: Primary | ICD-10-CM

## 2017-06-21 PROCEDURE — 80061 LIPID PANEL: CPT | Performed by: FAMILY MEDICINE

## 2017-06-21 PROCEDURE — 36415 COLL VENOUS BLD VENIPUNCTURE: CPT | Performed by: FAMILY MEDICINE

## 2017-06-21 PROCEDURE — 82947 ASSAY GLUCOSE BLOOD QUANT: CPT | Performed by: FAMILY MEDICINE

## 2017-06-21 PROCEDURE — 99214 OFFICE O/P EST MOD 30 MIN: CPT | Performed by: FAMILY MEDICINE

## 2017-06-21 NOTE — PROGRESS NOTES
SUBJECTIVE:                                                    Val Gallagher is a 26 year old female who presents to clinic today for the following health issues:    Three months ago pt started experiencing chest and left arm pain. She had an EKG which was normal. Pain starts in and under the breast and sometimes radiates to the arm. Pain lasts for 1-2 minutes, moderate, stabbing, no aggravating or relieving factors. No nausea, vomiting, heartburn, shortness of breath, dizziness, palpitations, neck pain, breast lump/pain, numbness/tingling of extremity. Paternal grandfather had bypass surgery. Unsure of Dads history, mom doesn't have HTN/CAD/HLD. Pt smoked on and off in college, no smoking for three years. Pt does mild exercise three times/week. No exertional pain during exercise. Pt goes to therapy every other week for excessive drinking and issues with her Dad. Now she goes for maintenance. Therapist noticed anxious tendencies but no anxiety. No recent URI's.     Problem list and histories reviewed & adjusted, as indicated.  Additional history: as documented    Labs reviewed in EPIC    Reviewed and updated as needed this visit by clinical staff       Reviewed and updated as needed this visit by Provider         ROS:  Constitutional, HEENT, cardiovascular, pulmonary, gi and gu systems are negative, except as otherwise noted.    OBJECTIVE:                                                    /78 (BP Location: Right arm, Patient Position: Chair, Cuff Size: Adult Regular)  Pulse 93  Temp 98.7  F (37.1  C) (Tympanic)  Resp 16  Wt 153 lb (69.4 kg)  LMP 05/30/2017 (Approximate)  SpO2 99%  BMI 32.54 kg/m2  Body mass index is 32.54 kg/(m^2).  GENERAL: healthy, alert and no distress  RESP: lungs clear to auscultation - no rales, rhonchi or wheezes  CV: regular rate and rhythm, normal S1 S2, no S3 or S4  MS: no chest wall tenderness     Diagnostic Test Results:  none      ASSESSMENT/PLAN:                                                       ## Atypical chest pain  - Pt has been experiencing intermittent chest pain. Normal EKG at urgent care. Mom with no significant cardiac history, Dad history unknown. Pt doesn't smoke and regularly exercises.   - D/d include cardiac related vs costochondritis vs GERD vs pleuritis vs strain vs anxiety; unlikely due to radiculopathy   - We discussed that symptoms are unlikely due to cardial etiology, however pt does have concerns about not being alive until her 27th bday, due to symptoms. After reassurance, pt was still hesitant. I recommended continuing monitor. She does carry a heavy bag, lessening the load and alternating the shoulders. Talking to her chiropractor for possible adjustments. If symptoms still persist, then pt can schedule an echo. As she hasn't had recent glucose, lipid labs, will obtain baseline to r/u diabetes mellitus and HLD.  - Exercise Stress Echocardiogram; Future    ## Screening for diabetes mellitus  - Glucose    ## Screening for lipid disorders  - Lipid Profile (Chol, Trig, HDL, LDL calc)        Chung Khan MD  [facility]

## 2017-06-21 NOTE — MR AVS SNAPSHOT
After Visit Summary   6/21/2017    Val Gallagher    MRN: [id number]           Patient Information     Date Of Birth          1990        Visit Information        Provider Department      6/21/2017 11:00 AM Chung Khan MD [facility]        Today's Diagnoses     Atypical chest pain    -  1    Screening for diabetes mellitus        Screening for lipid disorders          Care Instructions    Please call [phone] to schedule imaging.           Follow-ups after your visit        Future tests that were ordered for you today     Open Future Orders        Priority Expected Expires Ordered    Exercise Stress Echocardiogram Routine  6/21/2018 6/21/2017            Who to contact     If you have questions or need follow up information about today's clinic visit or your schedule please contact [facility] directly at [phone].  Normal or non-critical lab and imaging results will be communicated to you by [employer], letter or phone within 4 business days after the clinic has received the results. If you do not hear from us within 7 days, please contact the clinic through [employer] or phone. If you have a critical or abnormal lab result, we will notify you by phone as soon as possible.  Submit refill requests through [employer] or call your pharmacy and they will forward the refill request to us. Please allow 3 business days for your refill to be completed.          Additional Information About Your Visit        In[employer] Information     365[employer]s gives you secure access to your electronic health record. If you see a primary care provider, you can also send messages to your care team and make appointments. If you have questions, please call your primary care clinic.  If you do not have a primary care provider, please call [phone] and they will assist you.        Care EveryWhere ID     This is your Care EveryWhere ID. This could be used by other organizations to access  your [place] medical records  [id number]        Your Vitals Were     Pulse Temperature Respirations Last Period Pulse Oximetry BMI (Body Mass Index)    93 98.7  F (37.1  C) (Tympanic) 16 05/30/2017 (Approximate) 99% 32.54 kg/m2       Blood Pressure from Last 3 Encounters:   06/21/17 122/78   05/02/17 124/72   04/02/17 102/76    Weight from Last 3 Encounters:   06/21/17 153 lb (69.4 kg)   05/02/17 155 lb (70.3 kg)   04/02/17 155 lb (70.3 kg)              We Performed the Following     Glucose     Lipid Profile (Chol, Trig, HDL, LDL calc)        Primary Care Provider Office Phone # Fax #    Suzan Montgomery [phone]       [facility] [address]  [facility] [address]        Equal Access to Services     [facility] : Hadii aad ku hadasho Soиван, waaxda luqadaha, qaybta kaalmada adetobiyada, deep singh . So [facility] [phone].    ATENCIÓN: Si habla español, tiene a saini disposición servicios gratuitos de asistencia lingüística. Llame al [phone].    We comply with applicable federal civil rights laws and [place] laws. We do not discriminate on the basis of race, color, national origin, age, disability sex, sexual orientation or gender identity.            Thank you!     Thank you for choosing [facility]  for your care. Our goal is always to provide you with excellent care. Hearing back from our patients is one way we can continue to improve our services. Please take a few minutes to complete the written survey that you may receive in the mail after your visit with us. Thank you!             Your Updated Medication List - Protect others around you: Learn how to safely use, store and throw away your medicines at www.disposemymeds.org.          This list is accurate as of: 6/21/17 11:42 AM.  Always use your most recent med list.                   Brand Name Dispense Instructions for use Diagnosis    DIPHENHYDRAMINE HCL PO      Take 50 mg by  mouth nightly as needed Reported on 5/2/2017        etonogestrel-ethinyl estradiol 0.12-0.015 MG/24HR vaginal ring    NUVARING    3 each    Place 1 ring every 21 days then remove for 1 week.    Dysmenorrhea       fluticasone 50 MCG/ACT spray    FLONASE    16 g    Spray 2 sprays into both nostrils daily    Sinus pressure       MULTIVITAL PO      Reported on 5/2/2017        naproxen 500 MG tablet    NAPROSYN    30 tablet    Take 1 tablet (500 mg) by mouth 2 times daily as needed for moderate pain    Sinus pressure       ondansetron 4 MG ODT tab    ZOFRAN ODT    15 tablet    Take 1-2 tablets (4-8 mg) by mouth every 8 hours as needed for nausea    Viral gastroenteritis, Non-intractable vomiting with nausea, unspecified vomiting type       traZODone 50 MG tablet    DESYREL    30 tablet    Take 1 tablet (50 mg) by mouth At Bedtime    Insomnia, unspecified type

## 2017-06-21 NOTE — NURSING NOTE
"Chief Complaint   Patient presents with     Chest Pain     recurring left breast/chest/upper arm pain       Initial /78 (BP Location: Right arm, Patient Position: Chair, Cuff Size: Adult Regular)  Pulse 93  Temp 98.7  F (37.1  C) (Tympanic)  Resp 16  Wt 153 lb (69.4 kg)  LMP 05/30/2017 (Approximate)  SpO2 99%  BMI 32.54 kg/m2 Estimated body mass index is 32.54 kg/(m^2) as calculated from the following:    Height as of 5/2/17: 4' 9.5\" (1.461 m).    Weight as of this encounter: 153 lb (69.4 kg).  Medication Reconciliation: complete     Danica Eng MA      "

## 2017-06-22 LAB
CHOLEST SERPL-MCNC: 184 MG/DL
GLUCOSE SERPL-MCNC: 75 MG/DL (ref 70–99)
HDLC SERPL-MCNC: 95 MG/DL
LDLC SERPL CALC-MCNC: 72 MG/DL
NONHDLC SERPL-MCNC: 89 MG/DL
TRIGL SERPL-MCNC: 85 MG/DL

## 2017-12-04 ENCOUNTER — MYC REFILL (OUTPATIENT)
Dept: FAMILY MEDICINE | Facility: CLINIC | Age: 27
End: 2017-12-04

## 2017-12-04 DIAGNOSIS — G47.00 INSOMNIA, UNSPECIFIED TYPE: ICD-10-CM

## 2017-12-04 NOTE — TELEPHONE ENCOUNTER
Message from [employer]:  Original authorizing provider: Gaby Moscoso MD, MD Val Gallagher would like a refill of the following medications:  traZODone (DESYREL) 50 MG tablet [Gaby Moscoso MD, MD]    Preferred pharmacy: [facility] [address]    Comment:  I am receiving a message in the [place] terence that I have no trazodone refills left. I believe the trazodone prescription was mistakenly ordered for only 4 refills. It was renewed the same day as my Nuvaring prescription. My Nuvaring prescription is only 4 refills because a refill lasts 3 months, so that is correct. However, my trazodone only lasts one month. I believe both medications were accidentally renewed for 4 refills, instead of the trazodone being ordered for 12 refills.

## 2017-12-05 RX ORDER — TRAZODONE HYDROCHLORIDE 50 MG/1
50 TABLET, FILM COATED ORAL AT BEDTIME
Qty: 90 TABLET | Refills: 1 | Status: SHIPPED | OUTPATIENT
Start: 2017-12-05 | End: 2018-10-25

## 2017-12-05 NOTE — TELEPHONE ENCOUNTER
Last office visit 6/21/2017    Prescription approved per [facility] Refill Protocol.    Signed Prescriptions:                        Disp   Refills    traZODone (DESYREL) 50 MG tablet           90 tab*1        Sig: Take 1 tablet (50 mg) by mouth At Bedtime  Authorizing Provider: BARRY BELLA  Ordering User: VARINDER WILLARD      Closing encounter - no further actions needed at this time    Varinder Willard RN

## 2017-12-06 NOTE — PROGRESS NOTES
SUBJECTIVE:   Val Gallagher is a 27 year old female who presents to clinic today for the following health issues:    MOLE(S)      Has one mole on left inner thigh    Has been there entire life, has irregular border, growing hairs, not one solid color  No change to mole, stable for years.    Nipple Pain      Duration: 6 months    Description (location/character/radiation): left nipple red bump near piercing    Intensity:  moderate    Accompanying signs and symptoms: leaking pus    History (similar episodes/previous evaluation): pierced years ago    Precipitating or alleviating factors: None    Therapies tried and outcome: cleaning in shower, using spray  from piercing parlor     Nipple piercing 6 years ago, has intermittent pimple that comes and goes and drains some pus.  After initial piercing did take some time to heal but has not had issues with drainage until 6 months ago.    Patient Active Problem List   Diagnosis     Scoliosis (and kyphoscoliosis), idiopathic     Dysmenorrhea      frequent menses     CARDIOVASCULAR SCREENING; LDL GOAL LESS THAN 160     Past Surgical History:   Procedure Laterality Date     NO HISTORY OF SURGERY         Social History   Substance Use Topics     Smoking status: Never Smoker     Smokeless tobacco: Never Used     Alcohol use 0.0 oz/week     0 Standard drinks or equivalent per week      Comment:  1 to 4 max per week     Family History   Problem Relation Age of Onset     Family History Negative Mother      Sleep Disorder Mother      Obesity Father      DIABETES Paternal Grandmother          Current Outpatient Prescriptions   Medication Sig Dispense Refill     cephALEXin (KEFLEX) 500 MG capsule Take 1 capsule (500 mg) by mouth 4 times daily 40 capsule 0     traZODone (DESYREL) 50 MG tablet Take 1 tablet (50 mg) by mouth At Bedtime 90 tablet 1     etonogestrel-ethinyl estradiol (NUVARING) 0.12-0.015 MG/24HR vaginal ring Place 1 ring every 21 days then remove for 1 week. 3  each 3     ondansetron (ZOFRAN ODT) 4 MG ODT tab Take 1-2 tablets (4-8 mg) by mouth every 8 hours as needed for nausea (Patient not taking: Reported on 5/2/2017) 15 tablet 0     naproxen (NAPROSYN) 500 MG tablet Take 1 tablet (500 mg) by mouth 2 times daily as needed for moderate pain (Patient not taking: Reported on 5/2/2017) 30 tablet 1     fluticasone (FLONASE) 50 MCG/ACT nasal spray Spray 2 sprays into both nostrils daily (Patient not taking: Reported on 5/2/2017) 16 g 3     DIPHENHYDRAMINE HCL PO Take 50 mg by mouth nightly as needed Reported on 5/2/2017       Multiple Vitamins-Minerals (MULTIVITAL PO) Reported on 5/2/2017         ROS:  Integ as above, otherwise negative       OBJECTIVE:                                                    /76 (BP Location: Left arm, Patient Position: Chair, Cuff Size: Adult Regular)  Pulse 105  Temp 97.4  F (36.3  C) (Oral)  Resp 16  Wt 162 lb (73.5 kg)  SpO2 98%  BMI 34.45 kg/m2   GENERAL APPEARANCE: healthy, alert and no distress  EYES: Eyes grossly normal to inspection and conjunctivae and sclerae normal  RESP: respirations nonlabored   BREAST: left nipple with bar piercing.  Small amount of scabbing to lateral aspect piercing with some serous drainage, this was cultured. Nipple is tender to palpation, no erythema, induration, or palpable abscess  SKIN: 8mm mole to left inner thigh with raised components, color varies between dark and light brown.  Raised 5mm mole to left posterior neck, regular borders, brown in color  PSYCH: mentation appears normal and affect normal/bright        ASSESSMENT/PLAN:                                                    (N61.0) Nipple infection  (primary encounter diagnosis)  Comment: recurrent infection v. Sensitivity to piercing material  Plan: cephALEXin (KEFLEX) 500 MG capsule, Wound         Culture Aerobic Bacterial        Start keflex antibiotic 4 times a day for 10 days.  Hot packs to nipple throughout the day.  Consider  changing piercing to stainless steel.  Culture pending.    (D23.5) Benign neoplasm of skin of trunk, except scrotum  Comment: discussed risks/benefits of bx here v. With derm, prefers to see derm  Plan: DERMATOLOGY REFERRAL        Referral to derm      See Patient Instructions    Angle Harvey, [facility]    Patient Instructions   1.  Nipple drainage due to infection v. Allergy to piercing?  Start keflex antibiotic 4 times a day for 10 days.  Hot packs to nipple throughout the day.  Consider changing piercing to stainless steel.  Culture pending.    2.  Follow up with derm for skin check

## 2017-12-07 ENCOUNTER — OFFICE VISIT (OUTPATIENT)
Dept: FAMILY MEDICINE | Facility: CLINIC | Age: 27
End: 2017-12-07
Payer: COMMERCIAL

## 2017-12-07 VITALS
TEMPERATURE: 97.4 F | HEART RATE: 105 BPM | WEIGHT: 162 LBS | SYSTOLIC BLOOD PRESSURE: 118 MMHG | OXYGEN SATURATION: 98 % | DIASTOLIC BLOOD PRESSURE: 76 MMHG | BODY MASS INDEX: 34.45 KG/M2 | RESPIRATION RATE: 16 BRPM

## 2017-12-07 DIAGNOSIS — D23.5 BENIGN NEOPLASM OF SKIN OF TRUNK, EXCEPT SCROTUM: ICD-10-CM

## 2017-12-07 DIAGNOSIS — N61.0 NIPPLE INFECTION: Primary | ICD-10-CM

## 2017-12-07 PROCEDURE — 87070 CULTURE OTHR SPECIMN AEROBIC: CPT | Performed by: NURSE PRACTITIONER

## 2017-12-07 PROCEDURE — 87077 CULTURE AEROBIC IDENTIFY: CPT | Performed by: NURSE PRACTITIONER

## 2017-12-07 PROCEDURE — 99213 OFFICE O/P EST LOW 20 MIN: CPT | Performed by: NURSE PRACTITIONER

## 2017-12-07 RX ORDER — CEPHALEXIN 500 MG/1
500 CAPSULE ORAL 4 TIMES DAILY
Qty: 40 CAPSULE | Refills: 0 | Status: SHIPPED | OUTPATIENT
Start: 2017-12-07 | End: 2018-10-25

## 2017-12-07 NOTE — NURSING NOTE
"Chief Complaint   Patient presents with     Derm Problem       Initial /76 (BP Location: Left arm, Patient Position: Chair, Cuff Size: Adult Regular)  Pulse 105  Temp 97.4  F (36.3  C) (Oral)  Resp 16  Wt 162 lb (73.5 kg)  SpO2 98%  BMI 34.45 kg/m2 Estimated body mass index is 34.45 kg/(m^2) as calculated from the following:    Height as of 5/2/17: 4' 9.5\" (1.461 m).    Weight as of this encounter: 162 lb (73.5 kg).  Medication Reconciliation: complete     Graciela Paul, CMA      "

## 2017-12-07 NOTE — MR AVS SNAPSHOT
After Visit Summary   12/7/2017    Val Gallagher    MRN: [id number]           Patient Information     Date Of Birth          1990        Visit Information        Provider Department      12/7/2017 12:20 PM Angle Harvey APRN CNP [facility]        Today's Diagnoses     Nipple infection    -  1    Benign neoplasm of skin of trunk, except scrotum          Care Instructions    1.  Nipple drainage due to infection v. Allergy to piercing?  Start keflex antibiotic 4 times a day for 10 days.  Hot packs to nipple throughout the day.  Consider changing piercing to stainless steel.  Culture pending.    2.  Follow up with derm for skin check          Follow-ups after your visit        Additional Services     DERMATOLOGY REFERRAL       Your provider has referred you to: FMG: [facility] [phone]   [email or web address]  UMP: [facility] [phone]   [email or web address]  N: [place] [facility] [phone]  [email or web address]  [facility] [phone]   [email or web address]    Please be aware that coverage of these services is subject to the terms and limitations of your health insurance plan.  Call member services at your health plan with any benefit or coverage questions.      Please bring the following with you to your appointment:    (1) Any X-Rays, CTs or MRIs which have been performed.  Contact the facility where they were done to arrange for  prior to your scheduled appointment.    (2) List of current medications  (3) This referral request   (4) Any documents/labs given to you for this referral                  Who to contact     If you have questions or need follow up information about today's clinic visit or your schedule please contact [facility]  directly at [phone].  Normal or non-critical lab and imaging results will be communicated to you by MyChart, letter or phone within 4 business days after the clinic has received the results. If you do not hear from us within 7 days, please contact the clinic through [employer] or phone. If you have a critical or abnormal lab result, we will notify you by phone as soon as possible.  Submit refill requests through [employer]OHO or call your pharmacy and they will forward the refill request to us. Please allow 3 business days for your refill to be completed.          Additional Information About Your Visit        S[employer] Information     [employer] gives you secure access to your electronic health record. If you see a primary care provider, you can also send messages to your care team and make appointments. If you have questions, please call your primary care clinic.  If you do not have a primary care provider, please call [phone] and they will assist you.        Care EveryWhere ID     This is your Care EveryWhere ID. This could be used by other organizations to access your [place] medical records  [id number]        Your Vitals Were     Pulse Temperature Respirations Pulse Oximetry BMI (Body Mass Index)       105 97.4  F (36.3  C) (Oral) 16 98% 34.45 kg/m2        Blood Pressure from Last 3 Encounters:   12/07/17 118/76   06/21/17 122/78   05/02/17 124/72    Weight from Last 3 Encounters:   12/07/17 162 lb (73.5 kg)   06/21/17 153 lb (69.4 kg)   05/02/17 155 lb (70.3 kg)              We Performed the Following     DERMATOLOGY REFERRAL          Today's Medication Changes          These changes are accurate as of: 12/7/17  1:01 PM.  If you have any questions, ask your nurse or doctor.               Start taking these medicines.        Dose/Directions    cephALEXin 500 MG capsule   Commonly known as:  KEFLEX   Used for:  Nipple infection   Started by:  Angle Harvey APRN CNP        Dose:  500 mg   Take 1 capsule  (500 mg) by mouth 4 times daily   Quantity:  40 capsule   Refills:  0            Where to get your medicines      These medications were sent to [facility] [place], [place] - [address]  [address], [facility] [address]     Phone:  [phone]     cephALEXin 500 MG capsule                Primary Care Provider Office Phone # Fax #    Suzan Montgomery [phone]       [address]  [facility] [address]        Equal Access to Services     [facility] : Hadii aad ku hadasho Soomaali, waaxda luqadaha, qaybta kaalmada adeegyada, waxay idiin hayaan regan singh . So [facility] [phone].    ATENCIÓN: Si habla español, tiene a saini disposición servicios gratuitos de asistencia lingüística. Llame al [phone].    We comply with applicable federal civil rights laws and [place] laws. We do not discriminate on the basis of race, color, national origin, age, disability, sex, sexual orientation, or gender identity.            Thank you!     Thank you for choosing [facility]  for your care. Our goal is always to provide you with excellent care. Hearing back from our patients is one way we can continue to improve our services. Please take a few minutes to complete the written survey that you may receive in the mail after your visit with us. Thank you!             Your Updated Medication List - Protect others around you: Learn how to safely use, store and throw away your medicines at www.disposemymeds.org.          This list is accurate as of: 12/7/17  1:01 PM.  Always use your most recent med list.                   Brand Name Dispense Instructions for use Diagnosis    cephALEXin 500 MG capsule    KEFLEX    40 capsule    Take 1 capsule (500 mg) by mouth 4 times daily    Nipple infection       DIPHENHYDRAMINE HCL PO      Take 50 mg by mouth nightly as needed Reported on 5/2/2017        etonogestrel-ethinyl estradiol 0.12-0.015 MG/24HR vaginal ring    NUVARING    3  each    Place 1 ring every 21 days then remove for 1 week.    Dysmenorrhea       fluticasone 50 MCG/ACT spray    FLONASE    16 g    Spray 2 sprays into both nostrils daily    Sinus pressure       MULTIVITAL PO      Reported on 5/2/2017        naproxen 500 MG tablet    NAPROSYN    30 tablet    Take 1 tablet (500 mg) by mouth 2 times daily as needed for moderate pain    Sinus pressure       ondansetron 4 MG ODT tab    ZOFRAN ODT    15 tablet    Take 1-2 tablets (4-8 mg) by mouth every 8 hours as needed for nausea    Viral gastroenteritis, Non-intractable vomiting with nausea, unspecified vomiting type       traZODone 50 MG tablet    DESYREL    90 tablet    Take 1 tablet (50 mg) by mouth At Bedtime    Insomnia, unspecified type

## 2017-12-07 NOTE — PATIENT INSTRUCTIONS
1.  Nipple drainage due to infection v. Allergy to piercing?  Start keflex antibiotic 4 times a day for 10 days.  Hot packs to nipple throughout the day.  Consider changing piercing to stainless steel.  Culture pending.    2.  Follow up with derm for skin check

## 2017-12-09 LAB
BACTERIA SPEC CULT: ABNORMAL
SPECIMEN SOURCE: ABNORMAL

## 2017-12-11 NOTE — PROGRESS NOTES
Víctor Neal,    Your wound culture grew a bacteria called staph which is commonly found on the skin.  Because it is so common this does not necessarily mean the wound is infected, but the antibiotic we started you on should take care of the staph if it is causing an infection/    Angle Harvey CNP

## 2018-01-02 ENCOUNTER — ALLIED HEALTH/NURSE VISIT (OUTPATIENT)
Dept: NURSING | Facility: CLINIC | Age: 28
End: 2018-01-02

## 2018-01-02 DIAGNOSIS — Z11.1 SCREENING EXAMINATION FOR PULMONARY TUBERCULOSIS: Primary | ICD-10-CM

## 2018-01-02 PROCEDURE — 86580 TB INTRADERMAL TEST: CPT

## 2018-01-02 PROCEDURE — 99207 ZZC NO CHARGE NURSE ONLY: CPT

## 2018-01-02 NOTE — MR AVS SNAPSHOT
After Visit Summary   1/2/2018    Val Gallagher    MRN: [id number]           Patient Information     Date Of Birth          1990        Visit Information        Provider Department      1/2/2018 9:00 AM HW MEDICAL ASSISTANT [facility]        Today's Diagnoses     Screening examination for pulmonary tuberculosis    -  1       Follow-ups after your visit        Your next 10 appointments already scheduled     Jan 04, 2018 11:30 AM CST   Nurse Only with HW RN/TRIAGE NURSE ONLY   [facility] ([facility])    [address] [place] [id number] [phone]              Who to contact     If you have questions or need follow up information about today's clinic visit or your schedule please contact [facility] directly at [phone].  Normal or non-critical lab and imaging results will be communicated to you by MyChart, letter or phone within 4 business days after the clinic has received the results. If you do not hear from us within 7 days, please contact the clinic through FN[employer] or phone. If you have a critical or abnormal lab result, we will notify you by phone as soon as possible.  Submit refill requests through [facility] or call your pharmacy and they will forward the refill request to us. Please allow 3 business days for your refill to be completed.          Additional Information About Your Visit        MyChart Information     [facility] gives you secure access to your electronic health record. If you see a primary care provider, you can also send messages to your care team and make appointments. If you have questions, please call your primary care clinic.  If you do not have a primary care provider, please call [phone] and they will assist you.        Care EveryWhere ID     This is your Care EveryWhere ID. This could be used by other organizations to access your [place] medical records  [id number]         Blood  Pressure from Last 3 Encounters:   12/07/17 118/76   06/21/17 122/78   05/02/17 124/72    Weight from Last 3 Encounters:   12/07/17 162 lb (73.5 kg)   06/21/17 153 lb (69.4 kg)   05/02/17 155 lb (70.3 kg)              We Performed the Following     TB INTRADERMAL TEST        Primary Care Provider Office Phone # Fax #    Suzan Montgomery [phone]       [address]  [facility] [address]        Equal Access to Services     [facility]: Hadii aad ku hadasho Soomaali, waaxda luqadaha, qaybta kaalmada adeegyada, waxcameron pradoin hayroyal singh . So [facility] [phone].    ATENCIÓN: Si habla español, tiene a saini disposición servicios gratuitos de asistencia lingüística. [facility] [phone].    We comply with applicable federal civil rights laws and [place] laws. We do not discriminate on the basis of race, color, national origin, age, disability, sex, sexual orientation, or gender identity.            Thank you!     Thank you for choosing [facility]  for your care. Our goal is always to provide you with excellent care. Hearing back from our patients is one way we can continue to improve our services. Please take a few minutes to complete the written survey that you may receive in the mail after your visit with us. Thank you!             Your Updated Medication List - Protect others around you: Learn how to safely use, store and throw away your medicines at www.disposemymeds.org.          This list is accurate as of: 1/2/18 10:10 AM.  Always use your most recent med list.                   Brand Name Dispense Instructions for use Diagnosis    cephALEXin 500 MG capsule    KEFLEX    40 capsule    Take 1 capsule (500 mg) by mouth 4 times daily    Nipple infection       DIPHENHYDRAMINE HCL PO      Take 50 mg by mouth nightly as needed Reported on 5/2/2017        etonogestrel-ethinyl estradiol 0.12-0.015 MG/24HR vaginal ring    NUVARING    3 each    Place 1 ring every 21 days then  remove for 1 week.    Dysmenorrhea       fluticasone 50 MCG/ACT spray    FLONASE    16 g    Spray 2 sprays into both nostrils daily    Sinus pressure       MULTIVITAL PO      Reported on 5/2/2017        naproxen 500 MG tablet    NAPROSYN    30 tablet    Take 1 tablet (500 mg) by mouth 2 times daily as needed for moderate pain    Sinus pressure       ondansetron 4 MG ODT tab    ZOFRAN ODT    15 tablet    Take 1-2 tablets (4-8 mg) by mouth every 8 hours as needed for nausea    Viral gastroenteritis, Non-intractable vomiting with nausea, unspecified vomiting type       traZODone 50 MG tablet    DESYREL    90 tablet    Take 1 tablet (50 mg) by mouth At Bedtime    Insomnia, unspecified type

## 2018-01-02 NOTE — NURSING NOTE
The patient is asked the following questions today and these are her answers:    -Have you had a mantoux administered in the past 30 days?    No  -Have you had a previous positive Mantoux.  No  -Have you received BCG in the past.  No  -Have you had a live vaccine  (MMR, Varicella, OPV, Yellow Fever) in the last 6 weeks.  No  -Have you had and active  viral or bacterial infection in the past 6 weeks.  No  -Have you received corticosteroids or immunosuppressive agents in the past 6 weeks.  No  -Have you been diagnosed with HIV?  No  -Do you have a maglinancy?  No     Johnathan Eng MA

## 2018-01-04 ENCOUNTER — ALLIED HEALTH/NURSE VISIT (OUTPATIENT)
Dept: NURSING | Facility: CLINIC | Age: 28
End: 2018-01-04
Payer: COMMERCIAL

## 2018-01-04 DIAGNOSIS — Z11.1 VISIT FOR MANTOUX TEST: Primary | ICD-10-CM

## 2018-01-04 LAB
PPDINDURATION: 0 MM (ref 0–5)
PPDREDNESS: 0 MM

## 2018-01-04 PROCEDURE — 99207 ZZC NO CHARGE NURSE ONLY: CPT

## 2018-01-04 NOTE — NURSING NOTE
Mantoux results: No induration.  No swelling.  No redness.    Letter given to patient.    Hang JAIN

## 2018-01-04 NOTE — MR AVS SNAPSHOT
After Visit Summary   1/4/2018    Val Gallagher    MRN: [id number]           Patient Information     Date Of Birth          1990        Visit Information        Provider Department      1/4/2018 11:30 AM HW RN/TRIAGE NURSE ONLY [facility]        Today's Diagnoses     Visit for Mantoux test    -  1       Follow-ups after your visit        Who to contact     If you have questions or need follow up information about today's clinic visit or your schedule please contact [facility] directly at [phone].  Normal or non-critical lab and imaging results will be communicated to you by [address], letter or phone within 4 business days after the clinic has received the results. If you do not hear from us within 7 days, please contact the clinic through [employer] or phone. If you have a critical or abnormal lab result, we will notify you by phone as soon as possible.  Submit refill requests through [facility] or call your pharmacy and they will forward the refill request to us. Please allow 3 business days for your refill to be completed.          Additional Information About Your Visit        MyChart Information     [facility] gives you secure access to your electronic health record. If you see a primary care provider, you can also send messages to your care team and make appointments. If you have questions, please call your primary care clinic.  If you do not have a primary care provider, please call [phone] and they will assist you.        Care EveryWhere ID     This is your Care EveryWhere ID. This could be used by other organizations to access your [place] medical records  [id number]         Blood Pressure from Last 3 Encounters:   12/07/17 118/76   06/21/17 122/78   05/02/17 124/72    Weight from Last 3 Encounters:   12/07/17 162 lb (73.5 kg)   06/21/17 153 lb (69.4 kg)   05/02/17 155 lb (70.3 kg)              Today, you had the following     No orders found for display        Primary Care Provider Office Phone # Fax #    Suzan Montgomery [phone] [address]  [facility] [address]        Equal Access to Services     [facility] : Antony zohreh dorantes bryce Kang, wadarcyda luqadaha, qaybta kaalmada maldonado, deep alberts lahildatex estes. So [facility] [phone].    ATENCIÓN: Si habla español, tiene a saini disposición servicios gratuitos de asistencia lingüística. Llame al [phone].    We comply with applicable federal civil rights laws and [place] laws. We do not discriminate on the basis of race, color, national origin, age, disability, sex, sexual orientation, or gender identity.            Thank you!     Thank you for choosing [facility]  for your care. Our goal is always to provide you with excellent care. Hearing back from our patients is one way we can continue to improve our services. Please take a few minutes to complete the written survey that you may receive in the mail after your visit with us. Thank you!             Your Updated Medication List - Protect others around you: Learn how to safely use, store and throw away your medicines at www.disposemymeds.org.          This list is accurate as of: 1/4/18 11:33 AM.  Always use your most recent med list.                   Brand Name Dispense Instructions for use Diagnosis    cephALEXin 500 MG capsule    KEFLEX    40 capsule    Take 1 capsule (500 mg) by mouth 4 times daily    Nipple infection       DIPHENHYDRAMINE HCL PO      Take 50 mg by mouth nightly as needed Reported on 5/2/2017        etonogestrel-ethinyl estradiol 0.12-0.015 MG/24HR vaginal ring    NUVARING    3 each    Place 1 ring every 21 days then remove for 1 week.    Dysmenorrhea       fluticasone 50 MCG/ACT spray    FLONASE    16 g    Spray 2 sprays into both nostrils daily    Sinus pressure       MULTIVITAL PO      Reported on 5/2/2017        naproxen 500 MG tablet    NAPROSYN    30 tablet    Take 1 tablet (500  mg) by mouth 2 times daily as needed for moderate pain    Sinus pressure       ondansetron 4 MG ODT tab    ZOFRAN ODT    15 tablet    Take 1-2 tablets (4-8 mg) by mouth every 8 hours as needed for nausea    Viral gastroenteritis, Non-intractable vomiting with nausea, unspecified vomiting type       traZODone 50 MG tablet    DESYREL    90 tablet    Take 1 tablet (50 mg) by mouth At Bedtime    Insomnia, unspecified type

## 2018-01-04 NOTE — LETTER
[place] [id number] [place] [id number] [phone]          1/4/2018          To Whom it May Concern:     Val Gallagher, female, 1990 has had a mantoux on 1/2/2018.      Mantoux result is NEGATIVE:  Lab Results   Component Value Date    PPDREDNESS 0 01/04/2018    PPDINDURATIO 0 01/04/2018         Please contact me for questions or concerns.        Sincerely,      Hang RN

## 2018-04-23 DIAGNOSIS — N94.6 DYSMENORRHEA: ICD-10-CM

## 2018-04-24 NOTE — TELEPHONE ENCOUNTER
"Requested Prescriptions   Pending Prescriptions Disp Refills     NUVARING 0.12-0.015 MG/24HR vaginal ring [Pharmacy Med Name: NUVARING 0.12-0.015MG/24HR RING]  Last Written Prescription Date:  5/2/2017  Last Fill Quantity: 3,  # refills: 3   Last Office Visit: 12/7/2017   Future Office Visit:      3 each 3     Sig: INSERT 1 RING VAGINALLY AND LEAVE IN PLACE FOR 21 DAYS THEN REMOVE FOR ONE WEEK. THEN REPEAT WITH NEW RING.    Contraceptives Protocol Passed    4/23/2018  6:45 PM       Passed - Patient is not a current smoker if age is 35 or older       Passed - Recent (12 mo) or future (30 days) visit within the authorizing provider's specialty    Patient had office visit in the last 12 months or has a visit in the next 30 days with authorizing provider or within the authorizing provider's specialty.  See \"Patient Info\" tab in inbasket, or \"Choose Columns\" in Meds & Orders section of the refill encounter.           Passed - No active pregnancy on record       Passed - No positive pregnancy test in past 12 months          "

## 2018-04-25 RX ORDER — ETONOGESTREL/ETHINYL ESTRADIOL .12-.015MG
RING, VAGINAL VAGINAL
Qty: 3 EACH | Refills: 2 | Status: SHIPPED | OUTPATIENT
Start: 2018-04-25 | End: 2018-10-25

## 2018-04-25 NOTE — TELEPHONE ENCOUNTER
Prescription approved per [facility] Refill Protocol.    Signed Prescriptions:                        Disp   Refills    NUVARING 0.12-0.015 MG/24HR vaginal ring   3 each 2        Sig: INSERT 1 RING VAGINALLY AND LEAVE IN PLACE FOR 21           DAYS THEN REMOVE FOR ONE WEEK. THEN REPEAT WITH           NEW RING.  Authorizing Provider: BARRY BELLA  Ordering User: VARINDER WILLARD      Closing encounter - no further actions needed at this time    Varinder Willard RN

## 2018-10-23 ASSESSMENT — ENCOUNTER SYMPTOMS
PALPITATIONS: 0
SORE THROAT: 0
FEVER: 0
DYSURIA: 0
BREAST MASS: 0
SHORTNESS OF BREATH: 0
ABDOMINAL PAIN: 0
JOINT SWELLING: 0
HEMATURIA: 0
MYALGIAS: 0
DIARRHEA: 0
NERVOUS/ANXIOUS: 1
HEARTBURN: 0
PARESTHESIAS: 0
NAUSEA: 0
HEADACHES: 0
DIZZINESS: 0
ARTHRALGIAS: 0
CONSTIPATION: 0
WEAKNESS: 0
COUGH: 0
FREQUENCY: 0
EYE PAIN: 0
CHILLS: 0
HEMATOCHEZIA: 0

## 2018-10-25 ENCOUNTER — OFFICE VISIT (OUTPATIENT)
Dept: FAMILY MEDICINE | Facility: CLINIC | Age: 28
End: 2018-10-25
Payer: COMMERCIAL

## 2018-10-25 VITALS
HEIGHT: 68 IN | OXYGEN SATURATION: 96 % | HEART RATE: 92 BPM | SYSTOLIC BLOOD PRESSURE: 123 MMHG | BODY MASS INDEX: 25.8 KG/M2 | WEIGHT: 170.25 LBS | DIASTOLIC BLOOD PRESSURE: 77 MMHG

## 2018-10-25 DIAGNOSIS — Z00.00 ENCOUNTER FOR ROUTINE ADULT HEALTH EXAMINATION WITHOUT ABNORMAL FINDINGS: Primary | ICD-10-CM

## 2018-10-25 DIAGNOSIS — N94.6 DYSMENORRHEA: ICD-10-CM

## 2018-10-25 DIAGNOSIS — G47.00 INSOMNIA, UNSPECIFIED TYPE: ICD-10-CM

## 2018-10-25 PROCEDURE — 99395 PREV VISIT EST AGE 18-39: CPT | Performed by: FAMILY MEDICINE

## 2018-10-25 RX ORDER — TRAZODONE HYDROCHLORIDE 50 MG/1
50 TABLET, FILM COATED ORAL AT BEDTIME
Qty: 90 TABLET | Refills: 3 | Status: SHIPPED | OUTPATIENT
Start: 2018-10-25 | End: 2020-01-24

## 2018-10-25 RX ORDER — ETONOGESTREL AND ETHINYL ESTRADIOL VAGINAL RING .015; .12 MG/D; MG/D
1 RING VAGINAL
Qty: 3 EACH | Refills: 3 | Status: SHIPPED | OUTPATIENT
Start: 2018-10-25 | End: 2020-01-24

## 2018-10-25 ASSESSMENT — ENCOUNTER SYMPTOMS
PALPITATIONS: 0
HEMATURIA: 0
NERVOUS/ANXIOUS: 1
ABDOMINAL PAIN: 0
DIZZINESS: 0
MYALGIAS: 0
COUGH: 0
CHILLS: 0
HEADACHES: 0
CONSTIPATION: 0
NAUSEA: 0
BREAST MASS: 0
PARESTHESIAS: 0
ARTHRALGIAS: 0
DYSURIA: 0
SHORTNESS OF BREATH: 0
JOINT SWELLING: 0
HEMATOCHEZIA: 0
DIARRHEA: 0
FEVER: 0
FREQUENCY: 0
SORE THROAT: 0
WEAKNESS: 0
EYE PAIN: 0
HEARTBURN: 0

## 2018-10-25 NOTE — MR AVS SNAPSHOT
After Visit Summary   10/25/2018    Val Gallagher    MRN: [id number]           Patient Information     Date Of Birth          1990        Visit Information        Provider Department      10/25/2018 10:40 AM Gaby Moscoso MD [facility]        Today's Diagnoses     Encounter for routine adult health examination without abnormal findings    -  1    Dysmenorrhea        Insomnia, unspecified type          Care Instructions      Preventive Health Recommendations  Female Ages 26 - 39  Yearly exam:   See your health care provider every year in order to    Review health changes.     Discuss preventive care.      Review your medicines if you your doctor has prescribed any.    Until age 30: Get a Pap test every three years (more often if you have had an abnormal result).    After age 30: Talk to your doctor about whether you should have a Pap test every 3 years or have a Pap test with HPV screening every 5 years.   You do not need a Pap test if your uterus was removed (hysterectomy) and you have not had cancer.  You should be tested each year for STDs (sexually transmitted diseases), if you're at risk.   Talk to your provider about how often to have your cholesterol checked.  If you are at risk for diabetes, you should have a diabetes test (fasting glucose).  Shots: Get a flu shot each year. Get a tetanus shot every 10 years.   Nutrition:     Eat at least 5 servings of fruits and vegetables each day.    Eat whole-grain bread, whole-wheat pasta and brown rice instead of white grains and rice.    Get adequate Calcium and Vitamin D.     Lifestyle    Exercise at least 150 minutes a week (30 minutes a day, 5 days of the week). This will help you control your weight and prevent disease.    Limit alcohol to one drink per day.    No smoking.     Wear sunscreen to prevent skin cancer.    See your dentist every six months for an exam and cleaning.            Follow-ups after your visit        Your  "next 10 appointments already scheduled     Nov 02, 2018 10:00 AM CDT   [facility]    [address] [address] [phone]              Who to contact     If you have questions or need follow up information about today's clinic visit or your schedule please contact [facility] directly at [phone].  Normal or non-critical lab and imaging results will be communicated to you by MyTimehart, letter or phone within 4 business days after the clinic has received the results. If you do not hear from us within 7 days, please contact the clinic through [employer] or phone. If you have a critical or abnormal lab result, we will notify you by phone as soon as possible.  Submit refill requests through [employer] or call your pharmacy and they will forward the refill request to us. Please allow 3 business days for your refill to be completed.          Additional Information About Your Visit        [employer] Information     [employer] gives you secure access to your electronic health record. If you see a primary care provider, you can also send messages to your care team and make appointments. If you have questions, please call your primary care clinic.  If you do not have a primary care provider, please call [phone] and they will assist you.        Care EveryWhere ID     This is your Care EveryWhere ID. This could be used by other organizations to access your [place] medical records  [id number]        Your Vitals Were     Pulse Height Last Period Pulse Oximetry BMI (Body Mass Index)       92 5' 8.25\" (1.734 m) 10/21/2018 (Exact Date) 96% 25.7 kg/m2        Blood Pressure from Last 3 Encounters:   10/25/18 123/77   12/07/17 118/76   06/21/17 122/78    Weight from Last 3 Encounters:   10/25/18 170 lb 4 oz (77.2 kg)   12/07/17 162 lb (73.5 kg)   06/21/17 153 lb (69.4 kg)         "      Today, you had the following     No orders found for display         Today's Medication Changes          These changes are accurate as of 10/25/18 11:23 AM.  If you have any questions, ask your nurse or doctor.               These medicines have changed or have updated prescriptions.        Dose/Directions    etonogestrel-ethinyl estradiol 0.12-0.015 MG/24HR vaginal ring   Commonly known as:  NUVARING   This may have changed:  See the new instructions.   Used for:  Dysmenorrhea   Changed by:  Gaby Moscoso MD        Dose:  1 each   Place 1 each vaginally every 30 days   Quantity:  3 each   Refills:  3         Stop taking these medicines if you haven't already. Please contact your care team if you have questions.     cephALEXin 500 MG capsule   Commonly known as:  KEFLEX   Stopped by:  Gaby Moscoso MD           DIPHENHYDRAMINE HCL PO   Stopped by:  Gaby Moscoso MD           fluticasone 50 MCG/ACT spray   Commonly known as:  FLONASE   Stopped by:  Gaby Moscoso MD           MULTIVITAL PO   Stopped by:  Gaby Moscoso MD           naproxen 500 MG tablet   Commonly known as:  NAPROSYN   Stopped by:  Gaby Moscoso MD           ondansetron 4 MG ODT tab   Commonly known as:  ZOFRAN ODT   Stopped by:  Gaby Moscoso MD                Where to get your medicines      These medications were sent to [facility] [address]  [address] [facility] [address]     Phone:  [phone]     etonogestrel-ethinyl estradiol 0.12-0.015 MG/24HR vaginal ring    traZODone 50 MG tablet                Primary Care Provider Office Phone # Fax #    Suzan Montgomery [phone]       [address]  [facility] [address]        Equal Access to Services     [facility]: Antony Kang, chandrika cruz, ignacio okeefe, deep estes. So [facility] [phone].    ATENCIÓN: Si habla español, tiene a saini disposición  servicios gratuitos de asistencia lingüística. Roberta dan [phone].    We comply with applicable federal civil rights laws and [place] laws. We do not discriminate on the basis of race, color, national origin, age, disability, sex, sexual orientation, or gender identity.            Thank you!     Thank you for choosing [facility]  for your care. Our goal is always to provide you with excellent care. Hearing back from our patients is one way we can continue to improve our services. Please take a few minutes to complete the written survey that you may receive in the mail after your visit with us. Thank you!             Your Updated Medication List - Protect others around you: Learn how to safely use, store and throw away your medicines at www.disposemymeds.org.          This list is accurate as of 10/25/18 11:23 AM.  Always use your most recent med list.                   Brand Name Dispense Instructions for use Diagnosis    etonogestrel-ethinyl estradiol 0.12-0.015 MG/24HR vaginal ring    NUVARING    3 each    Place 1 each vaginally every 30 days    Dysmenorrhea       traZODone 50 MG tablet    DESYREL    90 tablet    Take 1 tablet (50 mg) by mouth At Bedtime    Insomnia, unspecified type

## 2018-10-25 NOTE — PROGRESS NOTES
SUBJECTIVE:   CC: Val Gallagher is an 28 year old woman who presents for preventive health visit.     Physical   Annual:     Getting at least 3 servings of Calcium per day:  Yes    Bi-annual eye exam:  NO    Dental care twice a year:  Yes    Sleep apnea or symptoms of sleep apnea:  None    Diet:  Carbohydrate counting    Frequency of exercise:  2-3 days/week    Duration of exercise:  30-45 minutes    Taking medications regularly:  Yes    Medication side effects:  None    Additional concerns today:  YES (medication renewal )    Today's PHQ-2 Score:   PHQ-2 ( 1999 [employer]) 10/23/2018   Q1: Little interest or pleasure in doing things 0   Q2: Feeling down, depressed or hopeless 0   PHQ-2 Score 0   Q1: Little interest or pleasure in doing things Not at all   Q2: Feeling down, depressed or hopeless Not at all   PHQ-2 Score 0     The trazodone works well for her sleep.      Abuse: Current or Past(Physical, Sexual or Emotional)- No  Do you feel safe in your environment - Yes    Social History   Substance Use Topics     Smoking status: Former Smoker     Years: 5.00     Types: Cigarettes, Hookah     Start date: 8/1/2011     Quit date: 8/1/2016     Smokeless tobacco: Never Used     Alcohol use 0.0 oz/week      Comment: Alcohol use 2-3x per week     Alcohol Use 10/23/2018   If you drink alcohol do you typically have greater than 3 drinks per day OR greater than 7 drinks per week? No   No flowsheet data found.    Reviewed orders with patient.  Reviewed health maintenance and updated orders accordingly - Yes  BP Readings from Last 3 Encounters:   10/25/18 123/77   12/07/17 118/76   06/21/17 122/78    Wt Readings from Last 3 Encounters:   10/25/18 170 lb 4 oz (77.2 kg)   12/07/17 162 lb (73.5 kg)   06/21/17 153 lb (69.4 kg)                  Patient Active Problem List   Diagnosis     Scoliosis (and kyphoscoliosis), idiopathic     Dysmenorrhea      frequent menses     CARDIOVASCULAR SCREENING; LDL GOAL LESS THAN 160     Past  Surgical History:   Procedure Laterality Date     NO HISTORY OF SURGERY         Social History   Substance Use Topics     Smoking status: Former Smoker     Years: 5.00     Types: Cigarettes, Hookah     Start date: 8/1/2011     Quit date: 8/1/2016     Smokeless tobacco: Never Used     Alcohol use 0.0 oz/week      Comment: Alcohol use 2-3x per week     Family History   Problem Relation Age of Onset     Family History Negative Mother      Sleep Disorder Mother      Depression Mother      Anxiety Disorder Mother      Obesity Father      Substance Abuse Father      Breast Cancer Maternal Grandmother      Depression Maternal Grandfather      Anxiety Disorder Maternal Grandfather      Diabetes Paternal Grandmother      Coronary Artery Disease Paternal Grandfather      Obesity Paternal Grandfather      Depression Brother      Anxiety Disorder Brother          Current Outpatient Prescriptions   Medication Sig Dispense Refill     NUVARING 0.12-0.015 MG/24HR vaginal ring INSERT 1 RING VAGINALLY AND LEAVE IN PLACE FOR 21 DAYS THEN REMOVE FOR ONE WEEK. THEN REPEAT WITH NEW RING. 3 each 2     traZODone (DESYREL) 50 MG tablet Take 1 tablet (50 mg) by mouth At Bedtime 90 tablet 1     cephALEXin (KEFLEX) 500 MG capsule Take 1 capsule (500 mg) by mouth 4 times daily (Patient not taking: Reported on 10/25/2018) 40 capsule 0     DIPHENHYDRAMINE HCL PO Take 50 mg by mouth nightly as needed Reported on 5/2/2017       fluticasone (FLONASE) 50 MCG/ACT nasal spray Spray 2 sprays into both nostrils daily (Patient not taking: Reported on 5/2/2017) 16 g 3     Multiple Vitamins-Minerals (MULTIVITAL PO) Reported on 5/2/2017       naproxen (NAPROSYN) 500 MG tablet Take 1 tablet (500 mg) by mouth 2 times daily as needed for moderate pain (Patient not taking: Reported on 5/2/2017) 30 tablet 1     ondansetron (ZOFRAN ODT) 4 MG ODT tab Take 1-2 tablets (4-8 mg) by mouth every 8 hours as needed for nausea (Patient not taking: Reported on 5/2/2017) 15  tablet 0     No Known Allergies  Recent Labs   Lab Test  17   1150   LDL  72   HDL  95   TRIG  85        Mammogram not appropriate for this patient based on age.    Pertinent mammograms are reviewed under the imaging tab.  History of abnormal Pap smear:   Last 3 Pap and HPV Results:   PAP / HPV 2017   PAP NIL NIL LSIL(A)     PAP / HPV 2017   PAP NIL NIL LSIL(A)     Reviewed and updated as needed this visit by clinical staff  Tobacco  Allergies  Meds         Reviewed and updated as needed this visit by Provider        Past Medical History:   Diagnosis Date     CARDIOVASCULAR SCREENING; LDL GOAL LESS THAN 160      LSIL (low grade squamous intraepithelial lesion) on Pap smear 2013    age 22     Other kyphoscoliosis and scoliosis     thoracolumbar      Past Surgical History:   Procedure Laterality Date     NO HISTORY OF SURGERY       Obstetric History       T0      L0     SAB0   TAB0   Ectopic0   Multiple0   Live Births0           Review of Systems   Constitutional: Negative for chills and fever.   HENT: Negative for congestion, ear pain, hearing loss and sore throat.    Eyes: Negative for pain and visual disturbance.   Respiratory: Negative for cough and shortness of breath.    Cardiovascular: Positive for chest pain. Negative for palpitations and peripheral edema.   Gastrointestinal: Negative for abdominal pain, constipation, diarrhea, heartburn, hematochezia and nausea.   Breasts:  Negative for tenderness, breast mass and discharge.   Genitourinary: Negative for dysuria, frequency, genital sores, hematuria, pelvic pain, urgency, vaginal bleeding and vaginal discharge.   Musculoskeletal: Negative for arthralgias, joint swelling and myalgias.   Skin: Negative for rash.   Neurological: Negative for dizziness, weakness, headaches and paresthesias.   Psychiatric/Behavioral: Negative for mood changes. The patient is nervous/anxious.      She was seen for  "atypical chest pain in 6/17.  Character of her pain has not changed.  She denies any exertional chest pain.  She has not scheduled with chiropractor and massage as recommended when she saw Dr. Khan.     From preventive exam in 5/2/17:   \"Chest pain: About a month ago, Val went to the  due to a pain behind the right breast which radiates to the back. The pain is sharp and comes suddenly, about three times a week, and in brief episodes. There's no specific trigger, no known alleviating factors. Thought to be musculoskeletal at the  visit. Reports normal EKG there. Denies exertional symptoms.      Anxiety: Val is working on her master's and her computer crashed during finals. 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 She takes trazadone to sleep, taking 25 mg per night. She wonders if she can take 50mg nightly.      She denies concern about STI. She has been with her partner for 4 years. Does need nuvaring refill. Tolerating it well. No spotting between periods.   \"     OBJECTIVE:   /77  Pulse 92  Ht 5' 8.25\" (1.734 m)  Wt 170 lb 4 oz (77.2 kg)  LMP 10/21/2018 (Exact Date)  SpO2 96%  BMI 25.7 kg/m2  Physical Exam  GENERAL: healthy, alert and no distress  EYES: Eyes grossly normal to inspection, PERRL and conjunctivae and sclerae normal  HENT: ear canals and TM's normal, nose and mouth without ulcers or lesions  NECK: no adenopathy, no asymmetry, masses, or scars and thyroid normal to palpation  RESP: lungs clear to auscultation - no rales, rhonchi or wheezes  BREAST: normal without masses, tenderness or nipple discharge and no palpable axillary masses or adenopathy  CV: regular rate and rhythm, normal S1 S2, no S3 or S4, no murmur, click or rub, no peripheral edema and peripheral pulses strong  ABDOMEN: soft, nontender, no hepatosplenomegaly, no masses and bowel sounds normal  MS: no gross musculoskeletal defects noted, no edema  SKIN: no suspicious lesions or " "rashes  NEURO: Normal strength and tone, mentation intact and speech normal  PSYCH: mentation appears normal, affect normal/bright    Diagnostic Test Results:  none     ASSESSMENT/PLAN:   1. Encounter for routine adult health examination without abnormal findings   pap due in 2020    2. Dysmenorrhea   controlled with nuvaring  - etonogestrel-ethinyl estradiol (NUVARING) 0.12-0.015 MG/24HR vaginal ring; Place 1 each vaginally every 30 days  Dispense: 3 each; Refill: 3    3. Insomnia, unspecified type  Controlled with trazodone 50mg nightly at bedtime.      COUNSELING:  Reviewed preventive health counseling, as reflected in patient instructions    BP Readings from Last 1 Encounters:   10/25/18 123/77     Estimated body mass index is 25.7 kg/(m^2) as calculated from the following:    Height as of this encounter: 5' 8.25\" (1.734 m).    Weight as of this encounter: 170 lb 4 oz (77.2 kg).      Weight management plan: diet and exercise     reports that she quit smoking about 2 years ago. Her smoking use included Cigarettes and Hookah. She started smoking about 7 years ago. She quit after 5.00 years of use. She has never used smokeless tobacco.      Counseling Resources:  ATP IV Guidelines  Pooled Cohorts Equation Calculator  Breast Cancer Risk Calculator  FRAX Risk Assessment  ICSI Preventive Guidelines  Dietary Guidelines for Americans, 2010  USDA's MyPlate  ASA Prophylaxis  Lung CA Screening    Gaby Moscoso MD   [facility]  Answers for HPI/ROS submitted by the patient on 10/23/2018   PHQ-2 Score: 0    "

## 2018-11-02 ENCOUNTER — OFFICE VISIT (OUTPATIENT)
Dept: DERMATOLOGY | Facility: CLINIC | Age: 28
End: 2018-11-02
Payer: COMMERCIAL

## 2018-11-02 VITALS — OXYGEN SATURATION: 98 % | SYSTOLIC BLOOD PRESSURE: 133 MMHG | DIASTOLIC BLOOD PRESSURE: 83 MMHG | HEART RATE: 106 BPM

## 2018-11-02 DIAGNOSIS — D22.9 NEVUS: ICD-10-CM

## 2018-11-02 DIAGNOSIS — D18.00 ANGIOMA: Primary | ICD-10-CM

## 2018-11-02 DIAGNOSIS — L81.4 LENTIGO: ICD-10-CM

## 2018-11-02 PROCEDURE — 99203 OFFICE O/P NEW LOW 30 MIN: CPT | Performed by: PHYSICIAN ASSISTANT

## 2018-11-02 NOTE — MR AVS SNAPSHOT
After Visit Summary   11/2/2018    Val Gallagher    MRN: [id number]           Patient Information     Date Of Birth          1990        Visit Information        Provider Department      11/2/2018 10:00 AM Priscila Jerome PA-C [facility]        Today's Diagnoses     Angioma    -  1    Lentigo        Nevus           Follow-ups after your visit        Who to contact     If you have questions or need follow up information about today's clinic visit or your schedule please contact [facility] directly at [phone].  Normal or non-critical lab and imaging results will be communicated to you by MyChart, letter or phone within 4 business days after the clinic has received the results. If you do not hear from us within 7 days, please contact the clinic through [employer] or phone. If you have a critical or abnormal lab result, we will notify you by phone as soon as possible.  Submit refill requests through [employer] or call your pharmacy and they will forward the refill request to us. Please allow 3 business days for your refill to be completed.          Additional Information About Your Visit        MyChart Information     [employer] gives you secure access to your electronic health record. If you see a primary care provider, you can also send messages to your care team and make appointments. If you have questions, please call your primary care clinic.  If you do not have a primary care provider, please call [phone] and they will assist you.        Care EveryWhere ID     This is your Care EveryWhere ID. This could be used by other organizations to access your [facility] medical records  [id number]        Your Vitals Were     Pulse Last Period Pulse Oximetry Breastfeeding?          106 10/21/2018 (Exact Date) 98% No         Blood Pressure from Last 3 Encounters:   11/02/18 133/83   10/25/18 123/77   12/07/17 118/76    Weight from Last 3 Encounters:    10/25/18 77.2 kg (170 lb 4 oz)   12/07/17 73.5 kg (162 lb)   06/21/17 69.4 kg (153 lb)              Today, you had the following     No orders found for display       Primary Care Provider Office Phone # Fax #    Suzan Montgomery [phone] [address]  [facility] [address]        Equal Access to Services     YANDEL BEAN : Hadii aad ku hadasho Soomaali, waaxda luqadaha, qaybta kaalmada adeegyada, waxay idiin hayaan adeeg kharash la'aan ah. So [facility] [phone].    ATENCIÓN: Si tanja rosenthal, tiene a saini disposición servicios gratuitos de asistencia lingüística. Llame al [phone].    We comply with applicable federal civil rights laws and [place] laws. We do not discriminate on the basis of race, color, national origin, age, disability, sex, sexual orientation, or gender identity.            Thank you!     Thank you for choosing [facility]  for your care. Our goal is always to provide you with excellent care. Hearing back from our patients is one way we can continue to improve our services. Please take a few minutes to complete the written survey that you may receive in the mail after your visit with us. Thank you!             Your Updated Medication List - Protect others around you: Learn how to safely use, store and throw away your medicines at www.disposemymeds.org.          This list is accurate as of 11/2/18 12:12 PM.  Always use your most recent med list.                   Brand Name Dispense Instructions for use Diagnosis    etonogestrel-ethinyl estradiol 0.12-0.015 MG/24HR vaginal ring    NUVARING    3 each    Place 1 each vaginally every 30 days    Dysmenorrhea       traZODone 50 MG tablet    DESYREL    90 tablet    Take 1 tablet (50 mg) by mouth At Bedtime    Insomnia, unspecified type

## 2018-11-02 NOTE — PROGRESS NOTES
HPI:   Val Gallagher is a 28 year old female who presents for Full skin cancer screening.  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   On review of systems, there are no further skin complaints, patient is feeling otherwise well.  See patient intake sheet.  ROS of the following were done and are negative: Constitutional, Eyes, Ears, Nose,   Mouth, Throat, Cardiovascular, Respiratory, GI, Genitourinary, Musculoskeletal,   Psychiatric, Endocrine, Allergic/Immunologic.    This document serves as a record of the services and decisions personally performed and made by Priscila Jerome, MS, PA-C. It was created on her behalf by Tamar Whelan, a trained medical scribe. The creation of this document is based on the provider's statements to the medical scribe.  Tamar Whelan 10:03 AM November 2, 2018    PHYSICAL EXAM:   /83  Pulse 106  LMP 10/21/2018 (Exact Date)  SpO2 98%  Breastfeeding? No  Skin exam performed as follows: Type 2 skin. Mood appropriate  Alert and Oriented X 3. Well developed, well nourished in no distress.  General appearance: Normal  Head including face: Normal  Eyes: conjunctiva and lids: Normal  Mouth: Lips, teeth, gums: Normal  Neck: Normal  Chest-breast/axillae: Normal  Back: Normal  Spleen and liver: Normal  Cardiovascular: Exam of peripheral vascular system by observation for swelling, varicosities, edema: Normal  Genitalia: groin, buttocks: Normal  Extremities: digits/nails (clubbing): Normal  Eccrine and Apocrine  glands: Normal  Right upper extremity: Normal  Left upper extremity: Normal  Right lower extremity: Normal  Left lower extremity: Normal  Skin: Scalp and body hair: See below    Pt deferred exam of breasts, groin, buttocks: No    Other physical findings:  1. Multiple pigmented macules on extremities and trunk  2. Multiple pigmented macules on face, trunk and extremities  3. Multiple vascular papules on trunk, arms and legs         Except as noted above, no other signs of skin cancer or melanoma.     ASSESSMENT/PLAN:   Benign Full skin cancer screening today.      Patient with history of none  Advised on monthly self exams and 1 year  Patient Education: Appropriate brochures given.    Multiple benign appearing nevi on arms, legs and trunk. Discussed ABCDEs of melanoma and sunscreen.   Multiple lentigos on arms, legs and trunk. Advised benign, no treatment needed.  Multiple scattered angiomas. Advised benign, no treatment needed.         Follow-up: 2-3 year FSE/PRN sooner    1.) Patient was asked about new and changing moles. YES  2.) Patient received a complete physical skin examination: YES  3.) Patient was counseled to perform a monthly self skin examination: YES  Scribed By: Tamar Whelan, Medical Scribe    The information in this document, created by the medical scribe for me, accurately reflects the services I personally performed and the decisions made by me. I have reviewed and approved this document for accuracy prior to leaving the patient care area.  November 2, 2018 10:06 AM    Priscila Jerome MS, PA-C

## 2018-11-02 NOTE — LETTER
11/2/2018         RE: Val Gallagher  [address]  [facility] [address]        Dear Colleague,    Thank you for referring your patient, Val Gallagher, to the [facility]. Please see a copy of my visit note below.    HPI:   Val Gallagher is a 28 year old female who presents for Full skin cancer screening.  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   On review of systems, there are no further skin complaints, patient is feeling otherwise well.  See patient intake sheet.  ROS of the following were done and are negative: Constitutional, Eyes, Ears, Nose,   Mouth, Throat, Cardiovascular, Respiratory, GI, Genitourinary, Musculoskeletal,   Psychiatric, Endocrine, Allergic/Immunologic.    This document serves as a record of the services and decisions personally performed and made by Priscila Jerome, MS, PA-C. It was created on her behalf by Tamar Whelan, a trained medical scribe. The creation of this document is based on the provider's statements to the medical scribe.  Tamar Whelan 10:03 AM November 2, 2018    PHYSICAL EXAM:   /83  Pulse 106  LMP 10/21/2018 (Exact Date)  SpO2 98%  Breastfeeding? No  Skin exam performed as follows: Type 2 skin. Mood appropriate  Alert and Oriented X 3. Well developed, well nourished in no distress.  General appearance: Normal  Head including face: Normal  Eyes: conjunctiva and lids: Normal  Mouth: Lips, teeth, gums: Normal  Neck: Normal  Chest-breast/axillae:  Normal  Back: Normal  Spleen and liver: Normal  Cardiovascular: Exam of peripheral vascular system by observation for swelling, varicosities, edema: Normal  Genitalia: groin, buttocks: Normal  Extremities: digits/nails (clubbing): Normal  Eccrine and Apocrine glands: Normal  Right upper extremity: Normal  Left upper extremity: Normal  Right lower extremity: Normal  Left lower extremity: Normal  Skin: Scalp and body hair: See below    Pt deferred exam of breasts, groin, buttocks: No    Other physical findings:  1. Multiple pigmented macules on extremities and trunk  2. Multiple pigmented macules on face, trunk and extremities  3. Multiple vascular papules on trunk, arms and legs         Except as noted above, no other signs of skin cancer or melanoma.     ASSESSMENT/PLAN:   Benign Full skin cancer screening today.      Patient with history of none  Advised on monthly self exams and 1 year  Patient Education: Appropriate brochures given.    Multiple benign appearing nevi on arms, legs and trunk. Discussed ABCDEs of melanoma and sunscreen.   Multiple lentigos on arms, legs and trunk. Advised benign, no treatment needed.  Multiple scattered angiomas. Advised benign, no treatment needed.         Follow-up: 2-3 year FSE/PRN sooner    1.) Patient was asked about new and changing moles. YES  2.) Patient received a complete physical skin examination: YES  3.) Patient was counseled to perform a monthly self skin examination: YES  Scribed By: Tamar Whelan, Medical Scribe    The information in this document, created by the medical scribe for me, accurately reflects the services I personally performed and the decisions made by me. I have reviewed and approved this document for accuracy prior to leaving the patient care area.  November 2, 2018 10:06 AM    Priscila Jerome MS, PAMINESH      Again, thank you for allowing me to participate in the care of your patient.        Sincerely,        Priscila Jerome PA-C

## 2019-03-17 ENCOUNTER — OFFICE VISIT (OUTPATIENT)
Dept: URGENT CARE | Facility: URGENT CARE | Age: 29
End: 2019-03-17
Payer: COMMERCIAL

## 2019-03-17 VITALS
WEIGHT: 170 LBS | SYSTOLIC BLOOD PRESSURE: 114 MMHG | DIASTOLIC BLOOD PRESSURE: 74 MMHG | OXYGEN SATURATION: 99 % | BODY MASS INDEX: 25.66 KG/M2 | TEMPERATURE: 98.2 F | RESPIRATION RATE: 18 BRPM | HEART RATE: 112 BPM

## 2019-03-17 DIAGNOSIS — R05.9 COUGH: Primary | ICD-10-CM

## 2019-03-17 LAB
DEPRECATED S PYO AG THROAT QL EIA: NORMAL
FLUAV+FLUBV AG SPEC QL: NEGATIVE
FLUAV+FLUBV AG SPEC QL: NEGATIVE
SPECIMEN SOURCE: NORMAL
SPECIMEN SOURCE: NORMAL

## 2019-03-17 PROCEDURE — 87880 STREP A ASSAY W/OPTIC: CPT | Performed by: FAMILY MEDICINE

## 2019-03-17 PROCEDURE — 87081 CULTURE SCREEN ONLY: CPT | Performed by: FAMILY MEDICINE

## 2019-03-17 PROCEDURE — 99214 OFFICE O/P EST MOD 30 MIN: CPT | Performed by: FAMILY MEDICINE

## 2019-03-17 PROCEDURE — 87804 INFLUENZA ASSAY W/OPTIC: CPT | Performed by: FAMILY MEDICINE

## 2019-03-17 NOTE — PROGRESS NOTES
SUBJECTIVE:   Val Gallagher is a 28 year old female presenting with a chief complaint of   Chief Complaint   Patient presents with     Urgent Care     URI     Started this morning, will need Dr note for Monday if she needs to miss   Complaining of muscle aches and pains.    Thanks that she had a fever at home    She is an established patient of [facility].    URI Adult    Onset of symptoms was 1 day(s) ago.  Course of illness is worsening.    Severity moderate  Current and Associated symptoms: fever and runny nose  Treatment measures tried include Tylenol/Ibuprofen.  Predisposing factors include None.        Review of Systems    Past Medical History:   Diagnosis Date     CARDIOVASCULAR SCREENING; LDL GOAL LESS THAN 160      LSIL (low grade squamous intraepithelial lesion) on Pap smear 8/2013    age 22     Other kyphoscoliosis and scoliosis     thoracolumbar     Family History   Problem Relation Age of Onset     Family History Negative Mother      Sleep Disorder Mother      Depression Mother      Anxiety Disorder Mother      Obesity Father      Substance Abuse Father      Breast Cancer Maternal Grandmother      Depression Maternal Grandfather      Anxiety Disorder Maternal Grandfather      Skin Cancer Maternal Grandfather      Diabetes Paternal Grandmother      Coronary Artery Disease Paternal Grandfather      Obesity Paternal Grandfather      Depression Brother      Anxiety Disorder Brother      Current Outpatient Medications   Medication Sig Dispense Refill     etonogestrel-ethinyl estradiol (NUVARING) 0.12-0.015 MG/24HR vaginal ring Place 1 each vaginally every 30 days 3 each 3     nabumetone (RELAFEN) 750 MG tablet Take 1 tablet (750 mg) by mouth 2 times daily 30 tablet 0     traZODone (DESYREL) 50 MG tablet Take 1 tablet (50 mg) by mouth At Bedtime 90 tablet 3     Social History     Tobacco Use     Smoking status: Former Smoker     Years: 5.00     Types: Cigarettes, Hookah     Start date: 8/1/2011     Last  attempt to quit: 2016     Years since quittin.6     Smokeless tobacco: Never Used   Substance Use Topics     Alcohol use: Yes     Alcohol/week: 0.0 oz     Comment: Alcohol use 2-3x per week       OBJECTIVE  /74   Pulse 112   Temp 98.2  F (36.8  C) (Oral)   Resp 18   Wt 77.1 kg (170 lb)   SpO2 99%   BMI 25.66 kg/m      Physical Exam    Labs:  Results for orders placed or performed in visit on 19   Strep, Rapid Screen   Result Value Ref Range    Specimen Description Throat     Rapid Strep A Screen       NEGATIVE: No Group A streptococcal antigen detected by immunoassay, await culture report.   Influenza A/B antigen   Result Value Ref Range    Influenza A/B Agn Specimen Nasal     Influenza A Negative NEG^Negative    Influenza B Negative NEG^Negative   Beta strep group A culture   Result Value Ref Range    Specimen Description Throat     Culture Micro No beta hemolytic Streptococcus Group A isolated        X-Ray was not done.    ASSESSMENT:      ICD-10-CM    1. Cough R05 Strep, Rapid Screen     Influenza A/B antigen     Beta strep group A culture     nabumetone (RELAFEN) 750 MG tablet   2.  Negative strep and influenza      3.  Myalgias flulike symptoms/     Medical Decision Making:    Differential Diagnosis:      Serious Comorbid Conditions:  Adult:  None    PLAN:    1.  Relafen twice daily for 10 days  2.  Increase fluids    Followup:    Follow-up with your primary care in 10 to 14 days sooner if increasing fever not appearing to improve in the next 48 to 72 hours    There are no Patient Instructions on file for this visit.  Level 4 visit  25 minutes in exam and counseling greater than 50% of the time in counseling regards to the upper respiratory infection thought that this is viral although not influenza

## 2019-03-17 NOTE — LETTER
[facility]  [address]  [facility] [address]  Phone: [phone]  Fax: [phone]    March 17, 2019        Val Gallagher  [address] [place] [id number]          To whom it may concern:    RE: Val Gallagher    Patient was seen and treated today at our clinic. She is unable to work on 3/18/2019    Please contact me for questions or concerns.      Sincerely,        Bin Jimenes MD

## 2019-03-18 LAB
BACTERIA SPEC CULT: NORMAL
SPECIMEN SOURCE: NORMAL

## 2019-10-23 ENCOUNTER — OFFICE VISIT (OUTPATIENT)
Dept: FAMILY MEDICINE | Facility: CLINIC | Age: 29
End: 2019-10-23
Payer: COMMERCIAL

## 2019-10-23 VITALS
BODY MASS INDEX: 25.76 KG/M2 | SYSTOLIC BLOOD PRESSURE: 124 MMHG | OXYGEN SATURATION: 96 % | DIASTOLIC BLOOD PRESSURE: 72 MMHG | WEIGHT: 170 LBS | HEIGHT: 68 IN | HEART RATE: 84 BPM | TEMPERATURE: 98.2 F | RESPIRATION RATE: 16 BRPM

## 2019-10-23 DIAGNOSIS — J02.9 SORE THROAT: ICD-10-CM

## 2019-10-23 DIAGNOSIS — J06.9 VIRAL UPPER RESPIRATORY TRACT INFECTION: Primary | ICD-10-CM

## 2019-10-23 DIAGNOSIS — Z23 NEED FOR PROPHYLACTIC VACCINATION AND INOCULATION AGAINST INFLUENZA: ICD-10-CM

## 2019-10-23 LAB
DEPRECATED S PYO AG THROAT QL EIA: NORMAL
SPECIMEN SOURCE: NORMAL

## 2019-10-23 PROCEDURE — 99213 OFFICE O/P EST LOW 20 MIN: CPT | Mod: 25 | Performed by: FAMILY MEDICINE

## 2019-10-23 PROCEDURE — 87880 STREP A ASSAY W/OPTIC: CPT | Performed by: FAMILY MEDICINE

## 2019-10-23 PROCEDURE — 90686 IIV4 VACC NO PRSV 0.5 ML IM: CPT | Performed by: FAMILY MEDICINE

## 2019-10-23 PROCEDURE — 87081 CULTURE SCREEN ONLY: CPT | Performed by: FAMILY MEDICINE

## 2019-10-23 PROCEDURE — 90471 IMMUNIZATION ADMIN: CPT | Performed by: FAMILY MEDICINE

## 2019-10-23 ASSESSMENT — MIFFLIN-ST. JEOR: SCORE: 1541.67

## 2019-10-23 NOTE — LETTER
[facility]  [address] [facility] [place] [address]  Phone: [phone]    October 23, 2019        Val Gallagher  [address] [place] [id number]          To whom it may concern:    RE: Val Gallagher    Patient was seen and treated today at our clinic and missed work. She may return to work Friday 10/25/19 with no restrictions.    Please contact me for questions or concerns.      Sincerely,        Shiela Rodriguez MD

## 2019-10-23 NOTE — PATIENT INSTRUCTIONS
Pseudoephedrine every 4 hours, Afrin nasal spray for 3 days, neti pot twice daily, rest, fluids, tea with honey and lemon

## 2019-10-23 NOTE — RESULT ENCOUNTER NOTE
Patient was seen today in clinic.  I discussed results in clinic, please see clinic progress note.    Shiela Rodriguez MD 10/23/2019

## 2019-10-23 NOTE — PROGRESS NOTES
Subjective     Val Gallagher is a 29 year old female who presents to clinic today for the following health issues:    HPI   Acute Illness   Acute illness concerns: not getting better concern of mono coming back, possible strep  Onset: 1 week; initially just runny a nose but in the past day has been a lot worse with severe sore throat    Fever: no    Chills/Sweats: YES- some    Headache (location?): YES    Sinus Pressure:YES    Conjunctivitis:  no    Ear Pain: YES: bilateral, slightly    Rhinorrhea: YES    Congestion: YES- slightly, better lately    Sore Throat: YES     Cough: YES-dry    Wheeze: no    Decreased Appetite: YES    Nausea: no    Vomiting: no    Diarrhea:  no    Dysuria/Freq.: no    Fatigue/Achiness: YES    Sick/Strep Exposure: no     Therapies Tried and outcome: IBU, tea, rest    Patient Active Problem List   Diagnosis     Scoliosis (and kyphoscoliosis), idiopathic     Dysmenorrhea      frequent menses     CARDIOVASCULAR SCREENING; LDL GOAL LESS THAN 160     Past Surgical History:   Procedure Laterality Date     NO HISTORY OF SURGERY         Social History     Tobacco Use     Smoking status: Former Smoker     Years: 5.00     Types: Cigarettes, Hookah     Start date: 8/1/2011     Last attempt to quit: 8/1/2016     Years since quitting: 3.2     Smokeless tobacco: Never Used   Substance Use Topics     Alcohol use: Yes     Alcohol/week: 0.0 standard drinks     Comment: Alcohol use 2-3x per week     Family History   Problem Relation Age of Onset     Family History Negative Mother      Sleep Disorder Mother      Depression Mother      Anxiety Disorder Mother      Obesity Father      Substance Abuse Father      Breast Cancer Maternal Grandmother      Depression Maternal Grandfather      Anxiety Disorder Maternal Grandfather      Skin Cancer Maternal Grandfather      Diabetes Paternal Grandmother      Coronary Artery Disease Paternal Grandfather      Obesity Paternal Grandfather      Depression Brother       "Anxiety Disorder Brother          No Known Allergies  BP Readings from Last 3 Encounters:   10/23/19 124/72   03/17/19 114/74   11/02/18 133/83    Wt Readings from Last 3 Encounters:   10/23/19 77.1 kg (170 lb)   03/17/19 77.1 kg (170 lb)   10/25/18 77.2 kg (170 lb 4 oz)         Reviewed and updated as needed this visit by Provider         Review of Systems   ROS COMP: Constitutional, HEENT, cardiovascular, pulmonary, GI, , musculoskeletal, neuro, skin, endocrine and psych systems are negative, except as otherwise noted.      Objective    /72 (BP Location: Right arm, Patient Position: Chair, Cuff Size: Adult Large)   Pulse 84   Temp 98.2  F (36.8  C) (Oral)   Resp 16   Ht 1.715 m (5' 7.5\")   Wt 77.1 kg (170 lb)   SpO2 96%   BMI 26.23 kg/m    Body mass index is 26.23 kg/m .  Physical Exam   She appears well, vital signs are normal, afebrile.  Ears: TMs are pearly gray with normal light reflex bilaterally.  Neck is supple with full range of motion.  No lymphadenopathy noted.  Throat: Tonsils are 1+ and symmetrical bilaterally without exudates.  Nose is congested with clear/yellow drainage.  Sinuses non tender.   The chest is clear, without wheezes or rales. No respiratory distress noted.  Skin is warm and dry with normal turgor and brisk capillary refill.    ASSESSMENT:   Viral pharyngitis, URI    PLAN:  Symptomatic therapy suggested: push fluids, rest, gargle warm salt water, use vaporizer or mist in bedroom at night and recommended using neti pot or sinus rinse twice daily, instructed patient on how to use. Call or return to clinic prn if these symptoms worsen or fail to improve as anticipated.         Diagnostic Test Results:  Labs reviewed in Epic  Results for orders placed or performed in visit on 10/23/19 (from the past 24 hour(s))   Strep, Rapid Screen   Result Value Ref Range    Specimen Description Throat     Rapid Strep A Screen       NEGATIVE: No Group A streptococcal antigen detected by " immunoassay, await culture report.

## 2019-10-24 LAB
BACTERIA SPEC CULT: NORMAL
SPECIMEN SOURCE: NORMAL

## 2019-10-24 NOTE — RESULT ENCOUNTER NOTE
Strep culture neg, consistent with result given in clinic.  See progress note. No follow up needed.  Shiela Rodriguez MD 10/24/2019

## 2020-01-23 PROBLEM — G47.00 INSOMNIA, UNSPECIFIED TYPE: Status: ACTIVE | Noted: 2020-01-23

## 2020-01-23 NOTE — PROGRESS NOTES
SUBJECTIVE:   CC: Val Gallagher is an 29 year old woman who presents for preventive health visit.     Healthy Habits:     Getting at least 3 servings of Calcium per day:  Yes    Bi-annual eye exam:  NO    Dental care twice a year:  Yes    Sleep apnea or symptoms of sleep apnea:  None    Diet:  Carbohydrate counting    Frequency of exercise:  2-3 days/week    Duration of exercise:  30-45 minutes    Taking medications regularly:  Yes    Medication side effects:  None    PHQ-2 Total Score: 1    Additional concerns today:  Yes    Abnormal Mood Symptoms      Duration: Ongoing Anxiety for years    Description:  Depression: no   Anxiety: YES  Panic attacks: no      Accompanying signs and symptoms: see PHQ-9 and GORGE scores    History (similar episodes/previous evaluation): Yes in therapy    Precipitating or alleviating factors: None    Therapies tried and outcome: Has not been on medications.  PHQ-9 SCORE 1/24/2020   PHQ-9 Total Score 8     GORGE-7 SCORE 1/24/2020   Total Score 19        Today's PHQ-2 Score:   PHQ-2 ( 1999 Pfizer) 1/22/2020   Q1: Little interest or pleasure in doing things 0   Q2: Feeling down, depressed or hopeless 1   PHQ-2 Score 1   Q1: Little interest or pleasure in doing things Not at all   Q2: Feeling down, depressed or hopeless Several days   PHQ-2 Score 1     Abuse: Current or Past(Physical, Sexual or Emotional)- No  Do you feel safe in your environment? Yes    Social History     Tobacco Use     Smoking status: Former Smoker     Years: 5.00     Types: Cigarettes, Hookah     Start date: 8/1/2011     Last attempt to quit: 8/1/2016     Years since quitting: 3.4     Smokeless tobacco: Never Used   Substance Use Topics     Alcohol use: Yes     Alcohol/week: 0.0 standard drinks     Frequency: 4 or more times a week     Comment: Alcohol use 3-5x per week     If you drink alcohol do you typically have >3 drinks per day or >7 drinks per week? No    Alcohol Use 1/24/2020   Prescreen: >3 drinks/day or >7  "drinks/week? -   Prescreen: >3 drinks/day or >7 drinks/week? No     Reviewed orders with patient.  Reviewed health maintenance and updated orders accordingly - Yes  BP Readings from Last 3 Encounters:   01/24/20 120/80   10/23/19 124/72   03/17/19 114/74    Wt Readings from Last 3 Encounters:   01/24/20 75.5 kg (166 lb 8 oz)   10/23/19 77.1 kg (170 lb)   03/17/19 77.1 kg (170 lb)         Recent Labs   Lab Test 06/21/17  1150   LDL 72   HDL 95   TRIG 85        Mammogram not appropriate for this patient based on age.    Pertinent mammograms are reviewed under the imaging tab.  History of abnormal Pap smear: NO - age 21-29 PAP every 3 years recommended  PAP / HPV 5/2/2017 5/2/2016 8/2/2013   PAP NIL NIL LSIL(A)     Reviewed and updated as needed this visit by clinical staff  Tobacco  Allergies  Meds  Problems  Med Hx  Surg Hx  Fam Hx  Soc Hx          Reviewed and updated as needed this visit by Provider  Tobacco  Allergies  Meds  Problems  Med Hx  Surg Hx  Fam Hx  Soc Hx           Review of Systems  CONSTITUTIONAL: NEGATIVE for fever, chills, change in weight  INTEGUMENTARU/SKIN: NEGATIVE for worrisome rashes, moles or lesions  EYES: NEGATIVE for vision changes or irritation  ENT: NEGATIVE for ear, mouth and throat problems  RESP: NEGATIVE for significant cough or SOB  BREAST: NEGATIVE for masses, tenderness or discharge  CV: NEGATIVE for chest pain, palpitations or peripheral edema  GI: NEGATIVE for nausea, abdominal pain, heartburn, or change in bowel habits  : NEGATIVE for unusual urinary or vaginal symptoms. Periods are regular.  MUSCULOSKELETAL: NEGATIVE for significant arthralgias or myalgia  NEURO: NEGATIVE for weakness, dizziness or paresthesias  PSYCHIATRIC: POSITIVE foranxiety     OBJECTIVE:   /80 (Patient Position: Sitting, Cuff Size: Adult Regular)   Pulse 87   Temp 97.5  F (36.4  C) (Tympanic)   Resp 14   Ht 1.715 m (5' 7.5\")   Wt 75.5 kg (166 lb 8 oz)   LMP 01/20/2020 (Exact " Date)   SpO2 97%   Breastfeeding No   BMI 25.69 kg/m    Physical Exam  GENERAL: healthy, alert and no distress  EYES: Eyes grossly normal to inspection, PERRL and conjunctivae and sclerae normal  HENT: ear canals and TM's normal, nose and mouth without ulcers or lesions  NECK: no adenopathy, no asymmetry, masses, or scars and thyroid normal to palpation  RESP: lungs clear to auscultation - no rales, rhonchi or wheezes  BREAST: normal without masses, tenderness or nipple discharge and no palpable axillary masses or adenopathy  CV: regular rate and rhythm, normal S1 S2, no S3 or S4, no murmur, click or rub, no peripheral edema and peripheral pulses strong  ABDOMEN: soft, nontender, no hepatosplenomegaly, no masses and bowel sounds normal   (female): normal female external genitalia, normal urethral meatus, vaginal mucosa pink, moist, well rugated, and normal cervix/adnexa/uterus without masses or discharge  MS: no gross musculoskeletal defects noted, no edema  SKIN: no suspicious lesions or rashes  NEURO: Normal strength and tone, mentation intact and speech normal  PSYCH: mentation appears normal, affect normal/bright    Diagnostic Test Results:  Labs reviewed in Epic    ASSESSMENT/PLAN:       ICD-10-CM    1. Encounter for routine adult health examination without abnormal findings Z00.00    2. Pap smear for cervical cancer screening Z12.4 Pap imaged thin layer screen reflex to HPV if ASCUS - recommend age 25 - 29   3. Dysmenorrhea N94.6 etonogestrel-ethinyl estradiol (NUVARING) 0.12-0.015 MG/24HR vaginal ring   4. Insomnia, unspecified type G47.00 traZODone (DESYREL) 50 MG tablet   5. Anxiety F41.9 escitalopram (LEXAPRO) 10 MG tablet     Start low dose of lexapro for anxiety.  Continue seeing therapist and working on mindfulness.  Follow up in 1 month for medication check.  -Discussed that usually takes 4-6 weeks before an effect is noted. May have acute worsening of symptoms for the first two weeks. If severe  "mood changes or depression, please call and we will stop the medication right away.      COUNSELING:  Reviewed preventive health counseling, as reflected in patient instructions    Estimated body mass index is 25.69 kg/m  as calculated from the following:    Height as of this encounter: 1.715 m (5' 7.5\").    Weight as of this encounter: 75.5 kg (166 lb 8 oz).    Weight management plan: Discussed healthy diet and exercise guidelines     reports that she quit smoking about 3 years ago. Her smoking use included cigarettes and hookah. She started smoking about 8 years ago. She quit after 5.00 years of use. She has never used smokeless tobacco.    Counseling Resources:  ATP IV Guidelines  Pooled Cohorts Equation Calculator  Breast Cancer Risk Calculator  FRAX Risk Assessment  ICSI Preventive Guidelines  Dietary Guidelines for Americans, 2010  USDA's MyPlate  ASA Prophylaxis  Lung CA Screening    Sonya Solomon PA-C  [facility]  "

## 2020-01-24 ENCOUNTER — OFFICE VISIT (OUTPATIENT)
Dept: FAMILY MEDICINE | Facility: CLINIC | Age: 30
End: 2020-01-24
Payer: COMMERCIAL

## 2020-01-24 VITALS
RESPIRATION RATE: 14 BRPM | HEART RATE: 87 BPM | SYSTOLIC BLOOD PRESSURE: 120 MMHG | TEMPERATURE: 97.5 F | DIASTOLIC BLOOD PRESSURE: 80 MMHG | HEIGHT: 68 IN | BODY MASS INDEX: 25.23 KG/M2 | WEIGHT: 166.5 LBS | OXYGEN SATURATION: 97 %

## 2020-01-24 DIAGNOSIS — Z00.00 ENCOUNTER FOR ROUTINE ADULT HEALTH EXAMINATION WITHOUT ABNORMAL FINDINGS: Primary | ICD-10-CM

## 2020-01-24 DIAGNOSIS — G47.00 INSOMNIA, UNSPECIFIED TYPE: ICD-10-CM

## 2020-01-24 DIAGNOSIS — N94.6 DYSMENORRHEA: ICD-10-CM

## 2020-01-24 DIAGNOSIS — F41.9 ANXIETY: ICD-10-CM

## 2020-01-24 DIAGNOSIS — Z12.4 PAP SMEAR FOR CERVICAL CANCER SCREENING: ICD-10-CM

## 2020-01-24 PROCEDURE — 99395 PREV VISIT EST AGE 18-39: CPT | Performed by: PHYSICIAN ASSISTANT

## 2020-01-24 PROCEDURE — 99213 OFFICE O/P EST LOW 20 MIN: CPT | Mod: 25 | Performed by: PHYSICIAN ASSISTANT

## 2020-01-24 PROCEDURE — G0145 SCR C/V CYTO,THINLAYER,RESCR: HCPCS | Performed by: PHYSICIAN ASSISTANT

## 2020-01-24 RX ORDER — TRAZODONE HYDROCHLORIDE 50 MG/1
50 TABLET, FILM COATED ORAL AT BEDTIME
Qty: 90 TABLET | Refills: 3 | Status: SHIPPED | OUTPATIENT
Start: 2020-01-24 | End: 2021-01-29

## 2020-01-24 RX ORDER — ESCITALOPRAM OXALATE 10 MG/1
5 TABLET ORAL DAILY
Qty: 30 TABLET | Refills: 1 | Status: SHIPPED | OUTPATIENT
Start: 2020-01-24 | End: 2020-04-03

## 2020-01-24 RX ORDER — ETONOGESTREL AND ETHINYL ESTRADIOL VAGINAL RING .015; .12 MG/D; MG/D
1 RING VAGINAL
Qty: 3 EACH | Refills: 3 | Status: SHIPPED | OUTPATIENT
Start: 2020-01-24 | End: 2021-01-29

## 2020-01-24 SDOH — HEALTH STABILITY: MENTAL HEALTH: HOW OFTEN DO YOU HAVE A DRINK CONTAINING ALCOHOL?: 4 OR MORE TIMES A WEEK

## 2020-01-24 ASSESSMENT — PATIENT HEALTH QUESTIONNAIRE - PHQ9
5. POOR APPETITE OR OVEREATING: NEARLY EVERY DAY
SUM OF ALL RESPONSES TO PHQ QUESTIONS 1-9: 8

## 2020-01-24 ASSESSMENT — ANXIETY QUESTIONNAIRES
IF YOU CHECKED OFF ANY PROBLEMS ON THIS QUESTIONNAIRE, HOW DIFFICULT HAVE THESE PROBLEMS MADE IT FOR YOU TO DO YOUR WORK, TAKE CARE OF THINGS AT HOME, OR GET ALONG WITH OTHER PEOPLE: VERY DIFFICULT
5. BEING SO RESTLESS THAT IT IS HARD TO SIT STILL: MORE THAN HALF THE DAYS
7. FEELING AFRAID AS IF SOMETHING AWFUL MIGHT HAPPEN: MORE THAN HALF THE DAYS
1. FEELING NERVOUS, ANXIOUS, OR ON EDGE: NEARLY EVERY DAY
3. WORRYING TOO MUCH ABOUT DIFFERENT THINGS: NEARLY EVERY DAY
6. BECOMING EASILY ANNOYED OR IRRITABLE: NEARLY EVERY DAY
GAD7 TOTAL SCORE: 19
2. NOT BEING ABLE TO STOP OR CONTROL WORRYING: NEARLY EVERY DAY

## 2020-01-24 ASSESSMENT — MIFFLIN-ST. JEOR: SCORE: 1525.8

## 2020-01-25 ASSESSMENT — ANXIETY QUESTIONNAIRES: GAD7 TOTAL SCORE: 19

## 2020-01-29 LAB
COPATH REPORT: NORMAL
PAP: NORMAL

## 2020-04-02 DIAGNOSIS — F41.9 ANXIETY: ICD-10-CM

## 2020-04-03 RX ORDER — ESCITALOPRAM OXALATE 10 MG/1
10 TABLET ORAL DAILY
Qty: 30 TABLET | Refills: 1 | Status: SHIPPED | OUTPATIENT
Start: 2020-04-03 | End: 2020-06-03

## 2020-04-03 NOTE — TELEPHONE ENCOUNTER
"Requested Prescriptions   Pending Prescriptions Disp Refills     escitalopram (LEXAPRO) 10 MG tablet [Pharmacy Med Name: ESCITALOPRAM OXALATE 10MG TABS]  Last Written Prescription Date:  1/24/2020  Last Fill Quantity: 30 tablet,  # refills: 1   Last office visit: 1/24/2020 with prescribing provider:  Neha   Future Office Visit:     30 tablet 1     Sig: TAKE ONE-HALF TABLET BY MOUTH EVERY DAY FOR ONE WEEK, THEN INCREASE TO ONE TABLET DAILY       SSRIs Protocol Passed - 4/2/2020  8:56 PM        Passed - Recent (12 mo) or future (30 days) visit within the authorizing provider's specialty     Patient has had an office visit with the authorizing provider or a provider within the authorizing providers department within the previous 12 mos or has a future within next 30 days. See \"Patient Info\" tab in inbasket, or \"Choose Columns\" in Meds & Orders section of the refill encounter.              Passed - Medication is active on med list        Passed - Patient is age 18 or older        Passed - No active pregnancy on record        Passed - No positive pregnancy test in last 12 months              "

## 2020-06-03 DIAGNOSIS — F41.9 ANXIETY: ICD-10-CM

## 2020-06-03 RX ORDER — ESCITALOPRAM OXALATE 10 MG/1
TABLET ORAL
Qty: 30 TABLET | Refills: 1 | Status: SHIPPED | OUTPATIENT
Start: 2020-06-03 | End: 2020-08-03

## 2020-07-16 ENCOUNTER — VIRTUAL VISIT (OUTPATIENT)
Dept: FAMILY MEDICINE | Facility: OTHER | Age: 30
End: 2020-07-16

## 2020-07-16 DIAGNOSIS — Z20.822 SUSPECTED COVID-19 VIRUS INFECTION: Primary | ICD-10-CM

## 2020-07-16 PROCEDURE — U0003 INFECTIOUS AGENT DETECTION BY NUCLEIC ACID (DNA OR RNA); SEVERE ACUTE RESPIRATORY SYNDROME CORONAVIRUS 2 (SARS-COV-2) (CORONAVIRUS DISEASE [COVID-19]), AMPLIFIED PROBE TECHNIQUE, MAKING USE OF HIGH THROUGHPUT TECHNOLOGIES AS DESCRIBED BY CMS-2020-01-R: HCPCS | Performed by: FAMILY MEDICINE

## 2020-07-16 NOTE — LETTER
July 21, 2020        Val Gallagher  [address] [facility] [address]    This letter provides a written record that you were tested for COVID-19 on 7/16/20.       Your result was negative. This means that we didn t find the virus that causes COVID-19 in your sample. A test may show negative when you do actually have the virus. This can happen when the virus is in the early stages of infection, before you feel illness symptoms.    If you have symptoms   Stay home and away from others (self-isolate) until you meet ALL of the guidelines below:    You ve had no fever--and no medicine that reduces fever--for 3 full days (72 hours). And      Your other symptoms have gotten better. For example, your cough or breathing has improved. And     At least 10 days have passed since your symptoms started.    During this time:    Stay home. Don t go to work, school or anywhere else.     Stay in your own room, including for meals. Use your own bathroom if you can.    Stay away from others in your home. No hugging, kissing or shaking hands. No visitors.    Clean  high touch  surfaces often (doorknobs, counters, handles, etc.). Use a household cleaning spray or wipes. You can find a full list on the EPA website at www.epa.gov/pesticide-registration/list-n-disinfectants-use-against-sars-cov-2.    Cover your mouth and nose with a mask, tissue or washcloth to avoid spreading germs.    Wash your hands and face often with soap and water.    Going back to work  Check with your employer for any guidelines to follow for going back to work.    Employers: This document serves as formal notice that your employee tested negative for COVID-19, as of the testing date shown above.

## 2020-07-16 NOTE — PROGRESS NOTES
"Date: 2020 08:18:31  Clinician: Igor Roman  Clinician NPI: [id number]  Patient: Val Gallagher  Patient : 1990  Patient Address: [address] [place] [address]  Patient Phone: [phone]  Visit Protocol: URI  Patient Summary:  Val is a 29 year old ( : 1990 ) female who initiated a Visit for COVID-19 (Coronavirus) evaluation and screening. When asked the question \"Please sign me up to receive news, health information and promotions. \", Val responded \"Yes\".    When asked when her symptoms started, Val reported that she does not have any symptoms.   She denies having recent facial or sinus surgery in the past 60 days and taking antibiotic medication in the past month.    Pertinent COVID-19 (Coronavirus) information  In the past 14 days, Val has not worked in a congregate living setting.   She does not work or volunteer as healthcare worker or a  and does not work or volunteer in a healthcare facility.   Val also has not lived in a congregate living setting in the past 14 days. She does not live with a healthcare worker.   Val has had a close contact with a laboratory-confirmed COVID-19 patient in the last 14 days. Additional information about contact with COVID-19 (Coronavirus) patient as reported by the patient (free text): My mother tested positive on 7/15. She started experiencing symptoms on  or 7/10. I was in a cabin with her -. My fiance (whom I live with) was in her home on  dropping some things off, but she was not there at the time.   Pertinent medical history  Val does not get yeast infections when she takes antibiotics.   Val does not need a return to work/school note.   Weight: 165 lbs   Val does not smoke or use smokeless tobacco.   She denies pregnancy and denies breastfeeding. She has menstruated in the past month.   Weight: 165 lbs    MEDICATIONS: Lexapro oral, trazodone oral, NuvaRing vaginal, " ALLERGIES: NKDA  Clinician Response:  Dear Val,   Based on your exposure to COVID-19 (coronavirus), we would like to test you for this virus.  1. Please call [phone] to schedule your visit. Explain that you were referred by [facility] to have a COVID-19 test. Be ready to share [facility].  The following will serve as your written order for this COVID Test, ordered by me, for the indication of suspected COVID [Z20.828]: The test will be ordered in [employer], our electronic health record, after you are scheduled. It will show as ordered and authorized by Jimmy Rosas MD.  Order: COVID-19 (coronavirus) PCR for ASYMPTOMATIC EXPOSURE testing from [facility].  If you know you have had close contact with someone who tested positive, you should be quarantined for 14 days after this exposure. You should stay in quarantine for the14 days even if the covid test is negative, the optimal time to test after exposure is 5-7 days from the exposure  Quarantine means   What should I do?  For safety, it's very important to follow these rules. Do this for 14 days after the date you were last exposed to the virus..  Stay home and away from others. Don't go to school or anywhere else. Generally quarantine means staying home for work but there are some exceptions to this. Please contact your workplace.   No hugging, kissing or shaking hands.  Don't let anyone visit.  Cover your mouth and nose with a mask, tissue or washcloth to avoid spreading germs.  Wash your hands and face often. Use soap and water.  What are the symptoms of COVID-19?  The most common symptoms are cough, fever and trouble breathing. Less common symptoms include headache, body aches, fatigue (feeling very tired), chills, sore throat, stuffy or runny nose, diarrhea (loose poop), loss of taste or smell, belly pain, and nausea or vomiting (feeling sick to your stomach or throwing up).  After 14 days, if you have still don't have symptoms, you likely don't have  this virus.  If you develop symptoms, follow these guidelines.  If you're normally healthy: Please start another [facility] visit to report your symptoms. Go to [email or web address].  If you have a serious health problem (like cancer, heart failure, an organ transplant or kidney disease): Call your specialty clinic. Let them know that you might have COVID-19.  2. When it's time for your COVID test:  Stay at least 6 feet away from others. (If someone will drive you to your test, stay in the backseat, as far away from the  as you can.)  Cover your mouth and nose with a mask, tissue or washcloth.  Go straight to the testing site. Don't make any stops on the way there or back.  Please note  Caregivers in these groups are at risk for severe illness due to COVID-19:  o People 65 years and older  o People who live in a nursing home or long-term care facility  o People with chronic disease (lung, heart, cancer, diabetes, kidney, liver, immunologic)  o People who have a weakened immune system, including those who:  Are in cancer treatment  Take medicine that weakens the immune system, such as corticosteroids  Had a bone marrow or organ transplant  Have an immune deficiency  Have poorly controlled HIV or AIDS  Are obese (body mass index of 40 or higher)  Smoke regularly  Where can I get more information?  [facility] -- About COVID-19: [email or web address]  CDC -- What to Do If You're Sick: www.cdc.gov/coronavirus/2019-ncov/about/steps-when-sick.html  CDC -- Ending Home Isolation: www.cdc.gov/coronavirus/2019-ncov/hcp/disposition-in-home-patients.html  CDC -- Caring for Someone: www.cdc.gov/coronavirus/2019-ncov/if-you-are-sick/care-for-someone.html  [facility] -- Interim Guidance for Hospital Discharge to Home: www.health.[facility].[place].us/diseases/coronavirus/hcp/hospdischarge.pdf  [facility] clinical trials (COVID-19 research studies): clinicalaffairs.[facility]/n-clinical-trials  Below are the COVID-19 hotlines  at the [place] Department of Health ([facility]). Interpreters are available.  For health questions: Call 722-974-1776 or 1-541.792.5926 (7 a.m. to 7 p.m.)  For questions about schools and childcare: Call 841-817-0571 or 1-183.352.7349 (7 a.m. to 7 p.m.)    Diagnosis: Contact with and (suspected) exposure to other viral communicable diseases  Diagnosis ICD: Z20.828

## 2020-07-18 LAB
SARS-COV-2 RNA SPEC QL NAA+PROBE: NOT DETECTED
SPECIMEN SOURCE: NORMAL

## 2020-08-03 DIAGNOSIS — F41.9 ANXIETY: ICD-10-CM

## 2020-08-03 RX ORDER — ESCITALOPRAM OXALATE 10 MG/1
TABLET ORAL
Qty: 90 TABLET | Refills: 0 | Status: SHIPPED | OUTPATIENT
Start: 2020-08-03 | End: 2020-11-05

## 2020-11-04 DIAGNOSIS — F41.9 ANXIETY: ICD-10-CM

## 2020-11-05 RX ORDER — ESCITALOPRAM OXALATE 10 MG/1
TABLET ORAL
Qty: 90 TABLET | Refills: 0 | Status: SHIPPED | OUTPATIENT
Start: 2020-11-05 | End: 2021-01-26

## 2020-11-22 ENCOUNTER — HEALTH MAINTENANCE LETTER (OUTPATIENT)
Age: 30
End: 2020-11-22

## 2021-01-29 ENCOUNTER — OFFICE VISIT (OUTPATIENT)
Dept: FAMILY MEDICINE | Facility: CLINIC | Age: 31
End: 2021-01-29
Payer: COMMERCIAL

## 2021-01-29 VITALS
TEMPERATURE: 99.1 F | RESPIRATION RATE: 16 BRPM | HEART RATE: 92 BPM | HEIGHT: 68 IN | BODY MASS INDEX: 25.76 KG/M2 | DIASTOLIC BLOOD PRESSURE: 74 MMHG | WEIGHT: 170 LBS | OXYGEN SATURATION: 98 % | SYSTOLIC BLOOD PRESSURE: 118 MMHG

## 2021-01-29 DIAGNOSIS — G47.00 INSOMNIA, UNSPECIFIED TYPE: ICD-10-CM

## 2021-01-29 DIAGNOSIS — Z00.00 ROUTINE HISTORY AND PHYSICAL EXAMINATION OF ADULT: Primary | ICD-10-CM

## 2021-01-29 DIAGNOSIS — N94.6 DYSMENORRHEA: ICD-10-CM

## 2021-01-29 DIAGNOSIS — F41.9 ANXIETY: ICD-10-CM

## 2021-01-29 PROCEDURE — 90471 IMMUNIZATION ADMIN: CPT | Performed by: PHYSICIAN ASSISTANT

## 2021-01-29 PROCEDURE — 99395 PREV VISIT EST AGE 18-39: CPT | Mod: 25 | Performed by: PHYSICIAN ASSISTANT

## 2021-01-29 PROCEDURE — 99213 OFFICE O/P EST LOW 20 MIN: CPT | Mod: 25 | Performed by: PHYSICIAN ASSISTANT

## 2021-01-29 PROCEDURE — 90686 IIV4 VACC NO PRSV 0.5 ML IM: CPT | Performed by: PHYSICIAN ASSISTANT

## 2021-01-29 RX ORDER — TRAZODONE HYDROCHLORIDE 50 MG/1
50 TABLET, FILM COATED ORAL AT BEDTIME
Qty: 90 TABLET | Refills: 3 | Status: SHIPPED | OUTPATIENT
Start: 2021-01-29 | End: 2022-03-04

## 2021-01-29 RX ORDER — BUPROPION HYDROCHLORIDE 150 MG/1
150 TABLET ORAL EVERY MORNING
Qty: 30 TABLET | Refills: 1 | Status: SHIPPED | OUTPATIENT
Start: 2021-01-29 | End: 2021-04-08

## 2021-01-29 RX ORDER — ETONOGESTREL AND ETHINYL ESTRADIOL VAGINAL RING .015; .12 MG/D; MG/D
1 RING VAGINAL
Qty: 3 EACH | Refills: 3 | Status: SHIPPED | OUTPATIENT
Start: 2021-01-29 | End: 2022-03-04

## 2021-01-29 RX ORDER — ESCITALOPRAM OXALATE 10 MG/1
10 TABLET ORAL DAILY
Qty: 90 TABLET | Refills: 3 | Status: SHIPPED | OUTPATIENT
Start: 2021-01-29 | End: 2021-04-08

## 2021-01-29 ASSESSMENT — ENCOUNTER SYMPTOMS
DIZZINESS: 0
HEMATOCHEZIA: 0
HEADACHES: 1
PARESTHESIAS: 0
WEAKNESS: 0
HEARTBURN: 0
CHILLS: 0
COUGH: 0
BREAST MASS: 0
ABDOMINAL PAIN: 0
SORE THROAT: 0
MYALGIAS: 0
ARTHRALGIAS: 0
SHORTNESS OF BREATH: 0
CONSTIPATION: 0
PALPITATIONS: 0
JOINT SWELLING: 0
HEMATURIA: 0
DIARRHEA: 0
FREQUENCY: 0
FEVER: 0
NERVOUS/ANXIOUS: 0
NAUSEA: 0
EYE PAIN: 0
DYSURIA: 0

## 2021-01-29 ASSESSMENT — MIFFLIN-ST. JEOR: SCORE: 1536.67

## 2021-01-29 NOTE — PROGRESS NOTES
Future Appointments   Date Time Provider Department Center   2021  8:00 AM Sonya Solomon PA-C [facility]     Appointment Notes for this encounter:   physical; [facility]; [facility]    Health Maintenance Due   Topic Date Due     PREVENTIVE CARE VISIT  2021     Health Maintenance addressed:  Will ask about flu shot      MyChart Status:  Active and Using        SUBJECTIVE:   CC: Val Gallagher is an 30 year old woman who presents for preventive health visit.       Patient has been advised of split billing requirements and indicates understanding: Yes  Healthy Habits:     Getting at least 3 servings of Calcium per day:  Yes    Bi-annual eye exam:  NO    Sleep apnea or symptoms of sleep apnea:  None    Diet:  Regular (no restrictions)    Duration of exercise:  45-60 minutes    Taking medications regularly:  Yes    Medication side effects:  Not applicable    PHQ-2 Total Score: 2    Additional concerns today:  No      PHQ-9 SCORE 2020   PHQ-9 Total Score 8     GORGE-7 SCORE 2020   Total Score 19            Today's PHQ-2 Score:   PHQ-2 (  [employer]) 2021   Q1: Little interest or pleasure in doing things 1   Q2: Feeling down, depressed or hopeless 1   PHQ-2 Score 2   Q1: Little interest or pleasure in doing things Several days   Q2: Feeling down, depressed or hopeless Several days   PHQ-2 Score 2       Abuse: Current or Past (Physical, Sexual or Emotional) - No  Do you feel safe in your environment? Yes        Social History     Tobacco Use     Smoking status: Former Smoker     Years: 5.00     Types: Cigarettes, Hookah     Start date: 2011     Quit date: 2016     Years since quittin.4     Smokeless tobacco: Never Used   Substance Use Topics     Alcohol use: Yes     Alcohol/week: 0.0 standard drinks     Frequency: 4 or more times a week     Comment: Alcohol use 3-5x per week     If you drink alcohol do you typically have >3 drinks per day or >7 drinks per week? No    Alcohol Use 2021    Prescreen: >3 drinks/day or >7 drinks/week? No   Prescreen: >3 drinks/day or >7 drinks/week? -       Any new diagnosis of family breast, ovarian, or bowel cancer? No     Reviewed orders with patient.  Reviewed health maintenance and updated orders accordingly - Yes  BP Readings from Last 3 Encounters:   01/29/21 118/74   01/24/20 120/80   10/23/19 124/72    Wt Readings from Last 3 Encounters:   01/29/21 77.1 kg (170 lb)   01/24/20 75.5 kg (166 lb 8 oz)   10/23/19 77.1 kg (170 lb)             Recent Labs   Lab Test 06/21/17  1150   LDL 72   HDL 95   TRIG 85        Breast CA Risk Screening:  No flowsheet data found.      Underage  Pertinent mammograms are reviewed under the imaging tab.    History of abnormal Pap smear: NO - age 21-29 PAP every 3 years recommended  PAP / HPV 1/24/2020 5/2/2017 5/2/2016   PAP NIL NIL NIL     Reviewed and updated as needed this visit by clinical staff  Tobacco  Allergies  Meds  Problems  Med Hx  Surg Hx  Fam Hx  Soc Hx          Reviewed and updated as needed this visit by Provider  Tobacco  Allergies  Meds  Problems  Med Hx  Surg Hx  Fam Hx             Review of Systems   Constitutional: Negative for chills and fever.   HENT: Negative for congestion, ear pain, hearing loss and sore throat.    Eyes: Negative for pain and visual disturbance.   Respiratory: Negative for shortness of breath.    Cardiovascular: Negative for chest pain, palpitations and peripheral edema.   Gastrointestinal: Negative for abdominal pain, diarrhea, heartburn, hematochezia and nausea.   Breasts:  Negative for tenderness, breast mass and discharge.   Genitourinary: Negative for dysuria, frequency, genital sores, hematuria, pelvic pain, urgency, vaginal bleeding and vaginal discharge.   Musculoskeletal: Negative for arthralgias, joint swelling and myalgias.   Skin: Negative for rash.   Neurological: Positive for headaches. Negative for dizziness, weakness and paresthesias.   Psychiatric/Behavioral:  "Positive for mood changes. The patient is not nervous/anxious.           OBJECTIVE:   /74 (BP Location: Right arm, Patient Position: Chair, Cuff Size: Adult Regular)   Pulse 92   Temp 99.1  F (37.3  C) (Oral)   Resp 16   Ht 1.715 m (5' 7.5\")   Wt 77.1 kg (170 lb)   LMP 01/22/2021 (Approximate)   SpO2 98%   Breastfeeding No   BMI 26.23 kg/m    Physical Exam  GENERAL: healthy, alert and no distress  EYES: Eyes grossly normal to inspection, PERRL and conjunctivae and sclerae normal  HENT: ear canals and TM's normal, nose and mouth without ulcers or lesions  NECK: no adenopathy, no asymmetry, masses, or scars and thyroid normal to palpation  RESP: lungs clear to auscultation - no rales, rhonchi or wheezes  BREAST: normal without masses, tenderness or nipple discharge and no palpable axillary masses or adenopathy  CV: regular rate and rhythm, normal S1 S2, no S3 or S4, no murmur, click or rub, no peripheral edema and peripheral pulses strong  ABDOMEN: soft, nontender, no hepatosplenomegaly, no masses and bowel sounds normal  MS: no gross musculoskeletal defects noted, no edema  SKIN: no suspicious lesions or rashes  NEURO: Normal strength and tone, mentation intact and speech normal  PSYCH: mentation appears normal, affect normal/bright    Diagnostic Test Results:  Labs reviewed in Epic    ASSESSMENT/PLAN:       ICD-10-CM    1. Routine history and physical examination of adult  Z00.00 REVIEW OF HEALTH MAINTENANCE PROTOCOL ORDERS   2. Anxiety  F41.9 escitalopram (LEXAPRO) 10 MG tablet     buPROPion (WELLBUTRIN XL) 150 MG 24 hr tablet   3. Dysmenorrhea  N94.6 etonogestrel-ethinyl estradiol (NUVARING) 0.12-0.015 MG/24HR vaginal ring   4. Insomnia, unspecified type  G47.00 traZODone (DESYREL) 50 MG tablet   Will try adding wellbutrin in the morning to see if helps with low libido and depressed mood.  Will follow up for visit in 1 month to see how she is doing.  May need to switch off of lexapro if hasn't " "improved.    Patient has been advised of split billing requirements and indicates understanding: Yes  COUNSELING:  Reviewed preventive health counseling, as reflected in patient instructions    Estimated body mass index is 26.23 kg/m  as calculated from the following:    Height as of this encounter: 1.715 m (5' 7.5\").    Weight as of this encounter: 77.1 kg (170 lb).    Weight management plan: Discussed healthy diet and exercise guidelines    She reports that she quit smoking about 4 years ago. Her smoking use included cigarettes and hookah. She started smoking about 9 years ago. She quit after 5.00 years of use. She has never used smokeless tobacco.      Counseling Resources:  ATP IV Guidelines  Pooled Cohorts Equation Calculator  Breast Cancer Risk Calculator  BRCA-Related Cancer Risk Assessment: FHS-7 Tool  FRAX Risk Assessment  ICSI Preventive Guidelines  Dietary Guidelines for Americans, 2010  USDA's MyPlate  ASA Prophylaxis  Lung CA Screening    Sonya Solomon PA-C  [facility]      "

## 2021-04-07 ASSESSMENT — ANXIETY QUESTIONNAIRES
4. TROUBLE RELAXING: MORE THAN HALF THE DAYS
7. FEELING AFRAID AS IF SOMETHING AWFUL MIGHT HAPPEN: SEVERAL DAYS
5. BEING SO RESTLESS THAT IT IS HARD TO SIT STILL: MORE THAN HALF THE DAYS
GAD7 TOTAL SCORE: 13
7. FEELING AFRAID AS IF SOMETHING AWFUL MIGHT HAPPEN: SEVERAL DAYS
GAD7 TOTAL SCORE: 13
GAD7 TOTAL SCORE: 13
6. BECOMING EASILY ANNOYED OR IRRITABLE: MORE THAN HALF THE DAYS
2. NOT BEING ABLE TO STOP OR CONTROL WORRYING: MORE THAN HALF THE DAYS
1. FEELING NERVOUS, ANXIOUS, OR ON EDGE: MORE THAN HALF THE DAYS
3. WORRYING TOO MUCH ABOUT DIFFERENT THINGS: MORE THAN HALF THE DAYS

## 2021-04-08 ENCOUNTER — VIRTUAL VISIT (OUTPATIENT)
Dept: FAMILY MEDICINE | Facility: CLINIC | Age: 31
End: 2021-04-08
Payer: COMMERCIAL

## 2021-04-08 DIAGNOSIS — F41.9 ANXIETY: Primary | ICD-10-CM

## 2021-04-08 DIAGNOSIS — Z71.89 ADVANCED CARE PLANNING/COUNSELING DISCUSSION: ICD-10-CM

## 2021-04-08 PROCEDURE — 99213 OFFICE O/P EST LOW 20 MIN: CPT | Mod: 95 | Performed by: PHYSICIAN ASSISTANT

## 2021-04-08 RX ORDER — BUPROPION HYDROCHLORIDE 150 MG/1
150 TABLET ORAL EVERY MORNING
Qty: 90 TABLET | Refills: 1 | Status: SHIPPED | OUTPATIENT
Start: 2021-04-08 | End: 2021-05-19

## 2021-04-08 RX ORDER — ESCITALOPRAM OXALATE 20 MG/1
20 TABLET ORAL DAILY
Qty: 90 TABLET | Refills: 1 | Status: SHIPPED | OUTPATIENT
Start: 2021-04-08 | End: 2021-10-15

## 2021-04-08 ASSESSMENT — ANXIETY QUESTIONNAIRES: GAD7 TOTAL SCORE: 13

## 2021-04-08 NOTE — PROGRESS NOTES
Val is a 30 year old who is being evaluated via a billable video visit.      How would you like to obtain your AVS? MyChart  If the video visit is dropped, the invitation should be resent by: Text to cell phone: [phone]  Will anyone else be joining your video visit? No    Video Start Time: 4:49 PM    Assessment & Plan     Anxiety  Increase lexapro, may need to increase wellbutrin in 1 month if sexual side effects return with dose increase.  Will send pt my chart message to check on her.  - escitalopram (LEXAPRO) 20 MG tablet; Take 1 tablet (20 mg) by mouth daily  - buPROPion (WELLBUTRIN XL) 150 MG 24 hr tablet; Take 1 tablet (150 mg) by mouth every morning    Advanced care planning/counseling discussion        Return in about 6 months (around 10/8/2021) for Med Check.    Sonya Solomon PA-C  [facility]   Val is a 30 year old who presents for the following health issues     HPI     Medication Followup of lexapro    Taking Medication as prescribed: yes    Side Effects:  None    Medication Helping Symptoms:  Maybe increasing the dose      Medication Followup of wellbutrin    Taking Medication as prescribed: yes    Side Effects:  None    Medication Helping Symptoms:  yes     PHQ-9 SCORE 1/24/2020   PHQ-9 Total Score 8     GORGE-7 SCORE 1/24/2020 4/7/2021   Total Score - 13 (moderate anxiety)   Total Score 19 13      Anxiety still has been bad.  Wellbutrin has been helping with the libido.      Review of Systems   Constitutional, HEENT, cardiovascular, pulmonary, gi and gu systems are negative, except as otherwise noted.      Objective         Vitals:  No vitals were obtained today due to virtual visit.    Physical Exam   GENERAL: Healthy, alert and no distress  EYES: Eyes grossly normal to inspection.  No discharge or erythema, or obvious scleral/conjunctival abnormalities.  RESP: No audible wheeze, cough, or visible cyanosis.  No visible retractions or  increased work of breathing.    SKIN: Visible skin clear. No significant rash, abnormal pigmentation or lesions.  NEURO: Cranial nerves grossly intact.  Mentation and speech appropriate for age.  PSYCH: Mentation appears normal, affect normal/bright, judgement and insight intact, normal speech and appearance well-groomed.                Video-Visit Details    Type of service:  Video Visit    Video End Time:4:55 PM    Originating Location (pt. Location): Home    Distant Location (provider location):  [facility]     Platform used for Video Visit: [employer]  Answers for HPI/ROS submitted by the patient on 4/7/2021   Chronic problems general questions HPI Form  GORGE 7 TOTAL SCORE: 13

## 2021-05-06 ENCOUNTER — MYC MEDICAL ADVICE (OUTPATIENT)
Dept: FAMILY MEDICINE | Facility: CLINIC | Age: 31
End: 2021-05-06

## 2021-05-06 DIAGNOSIS — F41.9 ANXIETY: ICD-10-CM

## 2021-05-18 ASSESSMENT — ANXIETY QUESTIONNAIRES
5. BEING SO RESTLESS THAT IT IS HARD TO SIT STILL: SEVERAL DAYS
6. BECOMING EASILY ANNOYED OR IRRITABLE: SEVERAL DAYS
7. FEELING AFRAID AS IF SOMETHING AWFUL MIGHT HAPPEN: SEVERAL DAYS
GAD7 TOTAL SCORE: 7
3. WORRYING TOO MUCH ABOUT DIFFERENT THINGS: SEVERAL DAYS
1. FEELING NERVOUS, ANXIOUS, OR ON EDGE: SEVERAL DAYS
4. TROUBLE RELAXING: SEVERAL DAYS
7. FEELING AFRAID AS IF SOMETHING AWFUL MIGHT HAPPEN: SEVERAL DAYS
GAD7 TOTAL SCORE: 7
2. NOT BEING ABLE TO STOP OR CONTROL WORRYING: SEVERAL DAYS

## 2021-05-18 ASSESSMENT — PATIENT HEALTH QUESTIONNAIRE - PHQ9
SUM OF ALL RESPONSES TO PHQ QUESTIONS 1-9: 5
SUM OF ALL RESPONSES TO PHQ QUESTIONS 1-9: 5
10. IF YOU CHECKED OFF ANY PROBLEMS, HOW DIFFICULT HAVE THESE PROBLEMS MADE IT FOR YOU TO DO YOUR WORK, TAKE CARE OF THINGS AT HOME, OR GET ALONG WITH OTHER PEOPLE: SOMEWHAT DIFFICULT

## 2021-05-19 RX ORDER — BUPROPION HYDROCHLORIDE 150 MG/1
300 TABLET ORAL EVERY MORNING
Qty: 180 TABLET | Refills: 1 | Status: SHIPPED | OUTPATIENT
Start: 2021-05-19 | End: 2021-07-16

## 2021-05-19 ASSESSMENT — PATIENT HEALTH QUESTIONNAIRE - PHQ9: SUM OF ALL RESPONSES TO PHQ QUESTIONS 1-9: 5

## 2021-05-19 ASSESSMENT — ANXIETY QUESTIONNAIRES: GAD7 TOTAL SCORE: 7

## 2021-06-16 ENCOUNTER — MYC MEDICAL ADVICE (OUTPATIENT)
Dept: FAMILY MEDICINE | Facility: CLINIC | Age: 31
End: 2021-06-16

## 2021-06-16 DIAGNOSIS — F41.9 ANXIETY: ICD-10-CM

## 2021-07-16 RX ORDER — BUPROPION HYDROCHLORIDE 150 MG/1
450 TABLET ORAL EVERY MORNING
Qty: 270 TABLET | Refills: 1 | Status: SHIPPED | OUTPATIENT
Start: 2021-07-16 | End: 2022-03-04

## 2021-09-19 ENCOUNTER — HEALTH MAINTENANCE LETTER (OUTPATIENT)
Age: 31
End: 2021-09-19

## 2021-09-21 ENCOUNTER — E-VISIT (OUTPATIENT)
Dept: FAMILY MEDICINE | Facility: CLINIC | Age: 31
End: 2021-09-21
Payer: COMMERCIAL

## 2021-09-21 DIAGNOSIS — Z20.822 SUSPECTED COVID-19 VIRUS INFECTION: ICD-10-CM

## 2021-09-21 DIAGNOSIS — J02.9 SORE THROAT: ICD-10-CM

## 2021-09-21 PROCEDURE — 99421 OL DIG E/M SVC 5-10 MIN: CPT | Performed by: FAMILY MEDICINE

## 2021-09-21 NOTE — PATIENT INSTRUCTIONS
Dear Val Gallagher,    Your symptoms show that you may have coronavirus (COVID-19). This illness can cause fever, cough and trouble breathing. Many people get a mild case and get better on their own. Some people can get very sick.    Because you also reported sore throat I would like to also test you for Strep Throat to determine if we need to treat you for that as well.    What should I do?  We would like to test you for Covid-19 virus and Strep Throat. I have placed orders for these tests.   To schedule: go to your [employer] home page and scroll down to the section that says  You have an appointment that needs to be scheduled  and click the large green button that says  Schedule Now  and follow the steps to find the next available openings. It is important that when you are asked what the reason for your appointment is that you mention you need BOTH Covid and Strep tests.    If you are unable to complete these [employer] scheduling steps, please call [phone] to schedule your testing.     Return to work/school/ guidance:   Please let your workplace manager and staffing office know when your quarantine ends     We can t give you an exact date as it depends on the above. You can calculate this on your own or work with your manager/staffing office to calculate this. (For example if you were exposed on 10/4, you would have to quarantine for 14 full days. That would be through 10/18. You could return on 10/19.)      If you receive a positive COVID-19 test result, follow the guidance of the those who are giving you the results. Usually the return to work is 10 (or in some cases 20 days from symptom onset.) If you work at [facility], you must also be cleared by Employee Occupational Health and Safety to return to work.        If you receive a negative COVID-19 test result and did not have a high risk exposure to someone with a known positive COVID-19 test, you can return to work once you're free of fever  for 24 hours without fever-reducing medication and your symptoms are improving or resolved.      If you receive a negative COVID-19 test and If you had a high risk exposure to someone who has tested positive for COVID-19 then you can return to work 14 days after your last contact with the positive individual    Note: If you have ongoing exposure to the covid positive person, this quarantine period may be more than 14 days. (For example, if you are continued to be exposed to your child who tested positive and cannot isolate from them, then the quarantine of 7-14 days can't start until your child is no longer contagious. This is typically 10 days from onset of the child's symptoms. So the total duration may be 17-24 days in this case.)    Sign up for [employer].   We know it's scary to hear that you might have COVID-19. We want to track your symptoms to make sure you're okay over the next 2 weeks. Please look for an email from [employer]--this is a free, online program that we'll use to keep in touch. To sign up, follow the link in the email you will receive. Learn more at [email or web address]    How can I take care of myself?    Get lots of rest. Drink extra fluids (unless a doctor has told you not to)    Take Tylenol (acetaminophen) or ibuprofen for fever or pain. If you have liver or kidney problems, ask your family doctor if it's okay to take Tylenol o ibuprofen    If you have other health problems (like cancer, heart failure, an organ transplant or severe kidney disease): Call your specialty clinic if you don't feel better in the next 2 days.    Know when to call 911. Emergency warning signs include:  o Trouble breathing or shortness of breath  o Pain or pressure in the chest that doesn't go away  o Feeling confused like you haven't felt before, or not being able to wake up  o Bluish-colored lips or face    Where can I get more information?  [facility] - About COVID-19:    [email or web address]    CDC - What to Do If You're Sick:   www.cdc.gov/coronavirus/2019-ncov/about/steps-when-sick.html

## 2021-09-22 ENCOUNTER — LAB (OUTPATIENT)
Dept: URGENT CARE | Facility: URGENT CARE | Age: 31
End: 2021-09-22
Attending: FAMILY MEDICINE
Payer: COMMERCIAL

## 2021-09-22 DIAGNOSIS — J02.9 SORE THROAT: ICD-10-CM

## 2021-09-22 DIAGNOSIS — Z20.822 SUSPECTED COVID-19 VIRUS INFECTION: ICD-10-CM

## 2021-09-22 LAB — DEPRECATED S PYO AG THROAT QL EIA: NEGATIVE

## 2021-09-22 PROCEDURE — 99207 PR NO CHARGE LOS: CPT

## 2021-09-22 PROCEDURE — U0005 INFEC AGEN DETEC AMPLI PROBE: HCPCS

## 2021-09-22 PROCEDURE — U0003 INFECTIOUS AGENT DETECTION BY NUCLEIC ACID (DNA OR RNA); SEVERE ACUTE RESPIRATORY SYNDROME CORONAVIRUS 2 (SARS-COV-2) (CORONAVIRUS DISEASE [COVID-19]), AMPLIFIED PROBE TECHNIQUE, MAKING USE OF HIGH THROUGHPUT TECHNOLOGIES AS DESCRIBED BY CMS-2020-01-R: HCPCS

## 2021-09-22 PROCEDURE — 87651 STREP A DNA AMP PROBE: CPT

## 2021-09-23 LAB
GROUP A STREP BY PCR: NOT DETECTED
SARS-COV-2 RNA RESP QL NAA+PROBE: NEGATIVE

## 2021-10-12 ENCOUNTER — E-VISIT (OUTPATIENT)
Dept: FAMILY MEDICINE | Facility: CLINIC | Age: 31
End: 2021-10-12
Payer: COMMERCIAL

## 2021-10-12 DIAGNOSIS — Z20.822 SUSPECTED COVID-19 VIRUS INFECTION: ICD-10-CM

## 2021-10-12 DIAGNOSIS — J02.9 SORE THROAT: ICD-10-CM

## 2021-10-12 PROCEDURE — 99421 OL DIG E/M SVC 5-10 MIN: CPT | Performed by: PHYSICIAN ASSISTANT

## 2021-10-12 NOTE — PATIENT INSTRUCTIONS
Dear Val Gallagher,    Your symptoms show that you may have coronavirus (COVID-19). This illness can cause fever, cough and trouble breathing. Many people get a mild case and get better on their own. Some people can get very sick.    Because you also reported sore throat I would like to also test you for Strep Throat to determine if we need to treat you for that as well.    What should I do?  We would like to test you for Covid-19 virus and Strep Throat. I have placed orders for these tests.   To schedule: go to your Showcase home page and scroll down to the section that says  You have an appointment that needs to be scheduled  and click the large green button that says  Schedule Now  and follow the steps to find the next available openings. It is important that when you are asked what the reason for your appointment is that you mention you need BOTH Covid and Strep tests.    If you are unable to complete these Showcase scheduling steps, please call [phone] to schedule your testing.     Return to work/school/ guidance:   Please let your workplace manager and staffing office know when your quarantine ends     We can t give you an exact date as it depends on the above. You can calculate this on your own or work with your manager/staffing office to calculate this. (For example if you were exposed on 10/4, you would have to quarantine for 14 full days. That would be through 10/18. You could return on 10/19.)      If you receive a positive COVID-19 test result, follow the guidance of the those who are giving you the results. Usually the return to work is 10 (or in some cases 20 days from symptom onset.) If you work at [facility], you must also be cleared by Employee Occupational Health and Safety to return to work.        If you receive a negative COVID-19 test result and did not have a high risk exposure to someone with a known positive COVID-19 test, you can return to work once you're free of fever  for 24 hours without fever-reducing medication and your symptoms are improving or resolved.      If you receive a negative COVID-19 test and If you had a high risk exposure to someone who has tested positive for COVID-19 then you can return to work 14 days after your last contact with the positive individual    Note: If you have ongoing exposure to the covid positive person, this quarantine period may be more than 14 days. (For example, if you are continued to be exposed to your child who tested positive and cannot isolate from them, then the quarantine of 7-14 days can't start until your child is no longer contagious. This is typically 10 days from onset of the child's symptoms. So the total duration may be 17-24 days in this case.)    Sign up for [employer].   We know it's scary to hear that you might have COVID-19. We want to track your symptoms to make sure you're okay over the next 2 weeks. Please look for an email from [employer]--this is a free, online program that we'll use to keep in touch. To sign up, follow the link in the email you will receive. Learn more at [email or web address]    How can I take care of myself?    Get lots of rest. Drink extra fluids (unless a doctor has told you not to)    Take Tylenol (acetaminophen) or ibuprofen for fever or pain. If you have liver or kidney problems, ask your family doctor if it's okay to take Tylenol o ibuprofen    If you have other health problems (like cancer, heart failure, an organ transplant or severe kidney disease): Call your specialty clinic if you don't feel better in the next 2 days.    Know when to call 911. Emergency warning signs include:  o Trouble breathing or shortness of breath  o Pain or pressure in the chest that doesn't go away  o Feeling confused like you haven't felt before, or not being able to wake up  o Bluish-colored lips or face    Where can I get more information?  [facility] - About COVID-19:    [email or web address]    CDC - What to Do If You're Sick:   www.cdc.gov/coronavirus/2019-ncov/about/steps-when-sick.html    October 12, 2021  RE:  Val Gallagher                                                                                                                  [address] [address]      To whom it may concern:    I evaluated Val Gallagher on October 12, 2021. Val Gallagher should be excused from work/school.     They should let their workplace manager and staffing office know when their quarantine ends.    We can not give an exact date as it depends on the information below. They can calculate this on their own or work with their manager/staffing office to calculate this. (For example if they were exposed on 10/04, they would have to quarantine for 14 full days. That would be through 10/18. They could return on 10/19.)    Quarantine Guidelines:      If patient receives a positive COVID-19 test result, they should follow the guidance of those who are giving the results. Usually the return to work is 10 (or in some cases 20 days from symptom onset.) If they work at [facility], they must be cleared by Employee Occupational Health and Safety to return to work.        If patient receives a negative COVID-19 test result and did not have a high risk exposure to someone with a known positive COVID-19 test, they can return to work once they're free of fever for 24 hours without fever-reducing medication and their symptoms are improving or resolved.      If patient receives a negative COVID-19 test and if they had a high risk exposure to someone who has tested positive for COVID-19 then they can return to work 14 days after their last contact with the positive individual    Note: If there is ongoing exposure to the covid positive person, this quarantine period may be longer than 14 days. (For example, if they are continually exposed to their child, who tested positive and cannot  isolate from them, then the quarantine of 7-14 days can't start until their child is no longer contagious. This is typically 10 days from onset to the child's symptoms. So the total duration may be 17-24 days in this case.)     Sincerely,  Gina Rosas PA-C

## 2021-10-13 ENCOUNTER — LAB (OUTPATIENT)
Dept: URGENT CARE | Facility: URGENT CARE | Age: 31
End: 2021-10-13
Attending: PHYSICIAN ASSISTANT
Payer: COMMERCIAL

## 2021-10-13 DIAGNOSIS — Z20.822 SUSPECTED COVID-19 VIRUS INFECTION: ICD-10-CM

## 2021-10-13 DIAGNOSIS — J02.9 SORE THROAT: ICD-10-CM

## 2021-10-13 LAB
DEPRECATED S PYO AG THROAT QL EIA: POSITIVE
SARS-COV-2 RNA RESP QL NAA+PROBE: NEGATIVE

## 2021-10-13 PROCEDURE — U0003 INFECTIOUS AGENT DETECTION BY NUCLEIC ACID (DNA OR RNA); SEVERE ACUTE RESPIRATORY SYNDROME CORONAVIRUS 2 (SARS-COV-2) (CORONAVIRUS DISEASE [COVID-19]), AMPLIFIED PROBE TECHNIQUE, MAKING USE OF HIGH THROUGHPUT TECHNOLOGIES AS DESCRIBED BY CMS-2020-01-R: HCPCS | Performed by: FAMILY MEDICINE

## 2021-10-13 PROCEDURE — 87880 STREP A ASSAY W/OPTIC: CPT | Performed by: FAMILY MEDICINE

## 2021-10-13 PROCEDURE — U0005 INFEC AGEN DETEC AMPLI PROBE: HCPCS | Performed by: FAMILY MEDICINE

## 2021-10-14 ENCOUNTER — TELEPHONE (OUTPATIENT)
Dept: FAMILY MEDICINE | Facility: CLINIC | Age: 31
End: 2021-10-14

## 2021-10-14 DIAGNOSIS — J02.0 STREP THROAT: Primary | ICD-10-CM

## 2021-10-14 RX ORDER — PENICILLIN V POTASSIUM 500 MG/1
500 TABLET, FILM COATED ORAL 2 TIMES DAILY
Qty: 20 TABLET | Refills: 0 | Status: SHIPPED | OUTPATIENT
Start: 2021-10-14 | End: 2021-10-24

## 2021-10-14 NOTE — TELEPHONE ENCOUNTER
Left message for patient on voicemail regarding positive strep test results.  Patient needs to be started on antibiotics.  Left message for patient to call back to let us know which pharmacy she would like this prescription sent to.    Dorothy Colon M.D. 10/14/2021 10:02 AM

## 2021-10-15 DIAGNOSIS — F41.9 ANXIETY: ICD-10-CM

## 2021-10-15 RX ORDER — ESCITALOPRAM OXALATE 20 MG/1
TABLET ORAL
Qty: 90 TABLET | Refills: 1 | Status: SHIPPED | OUTPATIENT
Start: 2021-10-15 | End: 2022-03-04

## 2021-10-20 ENCOUNTER — E-VISIT (OUTPATIENT)
Dept: URGENT CARE | Facility: CLINIC | Age: 31
End: 2021-10-20
Payer: COMMERCIAL

## 2021-10-20 DIAGNOSIS — J01.90 ACUTE SINUSITIS, RECURRENCE NOT SPECIFIED, UNSPECIFIED LOCATION: Primary | ICD-10-CM

## 2021-10-20 PROCEDURE — 99421 OL DIG E/M SVC 5-10 MIN: CPT | Performed by: NURSE PRACTITIONER

## 2021-10-20 NOTE — PATIENT INSTRUCTIONS
Dear Val Gallagher    After reviewing your responses, I've been able to diagnose you with?a sinus infection caused by a virus since the antibiotic you are taking is not helping your sinus symptoms. ?     It is also important to stay well hydrated, get lots of rest and take over-the-counter decongestants,?tylenol?or ibuprofen if you?are able to?take those medications per your primary care provider to help relieve discomfort.?     If your symptoms worsen, you develop severe headache, vomiting, high fever (>102), or are not improving in 7 days, please contact your primary care provider for an appointment or visit any of our convenient Walk-in Care or Urgent Care Centers to be seen which can be found on our website?here.?     Thanks again for choosing?us?as your health care partner,?   ?  Latesha, GUME?    The symptoms you describe suggest a viral cause, which is much more common than a bacterial cause. Antibiotics will treat bacterial infections, but have no effect on viral infections. If possible, especially if improving, start with symptom care for the first 7-10 days, then consider seeking further treatment or taking an antibiotic. Bacterial infections generally are more severe, including symptoms such as pus, fever over 101degrees F, or rapidly worsening.  You may want to try a nasal lavage (also known as nasal irrigation). You can find over-the-counter products, such as Neti-Pot, at retail locations or make your own at home. Instructions for homemade nasal lavage and more information on the process are available online at [email or web address].

## 2021-12-07 ENCOUNTER — IMMUNIZATION (OUTPATIENT)
Dept: NURSING | Facility: CLINIC | Age: 31
End: 2021-12-07
Payer: COMMERCIAL

## 2021-12-07 PROCEDURE — 0004A PR COVID VAC PFIZER DIL RECON 30 MCG/0.3 ML IM: CPT

## 2021-12-07 PROCEDURE — 91300 PR COVID VAC PFIZER DIL RECON 30 MCG/0.3 ML IM: CPT

## 2021-12-17 ENCOUNTER — E-VISIT (OUTPATIENT)
Dept: URGENT CARE | Facility: URGENT CARE | Age: 31
End: 2021-12-17
Payer: COMMERCIAL

## 2021-12-17 ENCOUNTER — LAB (OUTPATIENT)
Dept: URGENT CARE | Facility: URGENT CARE | Age: 31
End: 2021-12-17
Attending: PHYSICIAN ASSISTANT
Payer: COMMERCIAL

## 2021-12-17 DIAGNOSIS — J02.9 SORE THROAT: ICD-10-CM

## 2021-12-17 DIAGNOSIS — Z20.822 SUSPECTED COVID-19 VIRUS INFECTION: ICD-10-CM

## 2021-12-17 LAB
DEPRECATED S PYO AG THROAT QL EIA: NEGATIVE
GROUP A STREP BY PCR: NOT DETECTED

## 2021-12-17 PROCEDURE — 99421 OL DIG E/M SVC 5-10 MIN: CPT | Performed by: PHYSICIAN ASSISTANT

## 2021-12-17 PROCEDURE — U0003 INFECTIOUS AGENT DETECTION BY NUCLEIC ACID (DNA OR RNA); SEVERE ACUTE RESPIRATORY SYNDROME CORONAVIRUS 2 (SARS-COV-2) (CORONAVIRUS DISEASE [COVID-19]), AMPLIFIED PROBE TECHNIQUE, MAKING USE OF HIGH THROUGHPUT TECHNOLOGIES AS DESCRIBED BY CMS-2020-01-R: HCPCS

## 2021-12-17 PROCEDURE — U0005 INFEC AGEN DETEC AMPLI PROBE: HCPCS

## 2021-12-17 PROCEDURE — 87651 STREP A DNA AMP PROBE: CPT

## 2021-12-17 NOTE — PATIENT INSTRUCTIONS
Dear Val Gallagher,    Your symptoms show that you may have coronavirus (COVID-19). This illness can cause fever, cough and trouble breathing. Many people get a mild case and get better on their own. Some people can get very sick.    Because you also reported sore throat I would like to also test you for Strep Throat to determine if we need to treat you for that as well.    What should I do?  We would like to test you for Covid-19 virus and Strep Throat. I have placed orders for these tests.   To schedule: go to your [employer]match home page and scroll down to the section that says  You have an appointment that needs to be scheduled  and click the large green button that says  Schedule Now  and follow the steps to find the next available openings. It is important that when you are asked what the reason for your appointment is that you mention you need BOTH Covid and Strep tests.    If you are unable to complete these [employer]Xmatch scheduling steps, please call [phone] to schedule your testing.     Return to work/school/ guidance:   Please let your workplace manager and staffing office know when your quarantine ends     We can t give you an exact date as it depends on the above. You can calculate this on your own or work with your manager/staffing office to calculate this. (For example if you were exposed on 10/4, you would have to quarantine for 14 full days. That would be through 10/18. You could return on 10/19.)      If you receive a positive COVID-19 test result, follow the guidance of the those who are giving you the results. Usually the return to work is 10 (or in some cases 20 days from symptom onset.) If you work at [facility], you must also be cleared by Employee Occupational Health and Safety to return to work.        If you receive a negative COVID-19 test result and did not have a high risk exposure to someone with a known positive COVID-19 test, you can return to work once you're free of fever  for 24 hours without fever-reducing medication and your symptoms are improving or resolved.      If you receive a negative COVID-19 test and If you had a high risk exposure to someone who has tested positive for COVID-19 then you can return to work 14 days after your last contact with the positive individual    Note: If you have ongoing exposure to the covid positive person, this quarantine period may be more than 14 days. (For example, if you are continued to be exposed to your child who tested positive and cannot isolate from them, then the quarantine of 7-14 days can't start until your child is no longer contagious. This is typically 10 days from onset of the child's symptoms. So the total duration may be 17-24 days in this case.)    Sign up for [employer].   We know it's scary to hear that you might have COVID-19. We want to track your symptoms to make sure you're okay over the next 2 weeks. Please look for an email from [employer]--this is a free, online program that we'll use to keep in touch. To sign up, follow the link in the email you will receive. Learn more at [email or web address]    How can I take care of myself?    Get lots of rest. Drink extra fluids (unless a doctor has told you not to)    Take Tylenol (acetaminophen) or ibuprofen for fever or pain. If you have liver or kidney problems, ask your family doctor if it's okay to take Tylenol o ibuprofen    If you have other health problems (like cancer, heart failure, an organ transplant or severe kidney disease): Call your specialty clinic if you don't feel better in the next 2 days.    Know when to call 911. Emergency warning signs include:  o Trouble breathing or shortness of breath  o Pain or pressure in the chest that doesn't go away  o Feeling confused like you haven't felt before, or not being able to wake up  o Bluish-colored lips or face    Where can I get more information?  [facility] - About COVID-19:    [email or web address]    CDC - What to Do If You're Sick:   www.cdc.gov/coronavirus/2019-ncov/about/steps-when-sick.html    December 17, 2021  RE:  Val Gallagher                                                                                                                  [address] [address]      To whom it may concern:    I evaluated Val Gallagher on December 17, 2021. Val Gallagher should be excused from work/school.     They should let their workplace manager and staffing office know when their quarantine ends.    We can not give an exact date as it depends on the information below. They can calculate this on their own or work with their manager/staffing office to calculate this. (For example if they were exposed on 10/04, they would have to quarantine for 14 full days. That would be through 10/18. They could return on 10/19.)    Quarantine Guidelines:      If patient receives a positive COVID-19 test result, they should follow the guidance of those who are giving the results. Usually the return to work is 10 (or in some cases 20 days from symptom onset.) If they work at [facility], they must be cleared by Employee Occupational Health and Safety to return to work.        If patient receives a negative COVID-19 test result and did not have a high risk exposure to someone with a known positive COVID-19 test, they can return to work once they're free of fever for 24 hours without fever-reducing medication and their symptoms are improving or resolved.      If patient receives a negative COVID-19 test and if they had a high risk exposure to someone who has tested positive for COVID-19 then they can return to work 14 days after their last contact with the positive individual    Note: If there is ongoing exposure to the covid positive person, this quarantine period may be longer than 14 days. (For example, if they are continually exposed to their child, who tested positive and cannot  isolate from them, then the quarantine of 7-14 days can't start until their child is no longer contagious. This is typically 10 days from onset to the child's symptoms. So the total duration may be 17-24 days in this case.)     Sincerely,  Clinton Castle PA-C

## 2021-12-18 LAB — SARS-COV-2 RNA RESP QL NAA+PROBE: NEGATIVE

## 2021-12-30 ENCOUNTER — IMMUNIZATION (OUTPATIENT)
Dept: FAMILY MEDICINE | Facility: CLINIC | Age: 31
End: 2021-12-30
Payer: COMMERCIAL

## 2021-12-30 DIAGNOSIS — Z23 NEED FOR PROPHYLACTIC VACCINATION AND INOCULATION AGAINST INFLUENZA: Primary | ICD-10-CM

## 2021-12-30 PROCEDURE — 90686 IIV4 VACC NO PRSV 0.5 ML IM: CPT

## 2021-12-30 PROCEDURE — 90471 IMMUNIZATION ADMIN: CPT

## 2022-01-04 ENCOUNTER — E-VISIT (OUTPATIENT)
Dept: URGENT CARE | Facility: CLINIC | Age: 32
End: 2022-01-04
Payer: COMMERCIAL

## 2022-01-04 DIAGNOSIS — R19.7 DIARRHEA, UNSPECIFIED TYPE: Primary | ICD-10-CM

## 2022-01-04 PROCEDURE — 99207 PR NON-BILLABLE SERV PER CHARTING: CPT | Performed by: EMERGENCY MEDICINE

## 2022-01-04 NOTE — PATIENT INSTRUCTIONS
Dear Val Gallagher,    We are sorry you are not feeling well. Based on the responses you provided, it is recommended that you be seen in-person in urgent care so we can better evaluate your symptoms. Please click here to find the nearest urgent care location to you.   You will not be charged for this Visit. Thank you for trusting us with your care.    Clinton Turk MD

## 2022-01-08 ENCOUNTER — OFFICE VISIT (OUTPATIENT)
Dept: URGENT CARE | Facility: URGENT CARE | Age: 32
End: 2022-01-08
Payer: COMMERCIAL

## 2022-01-08 VITALS
OXYGEN SATURATION: 97 % | WEIGHT: 170 LBS | BODY MASS INDEX: 26.23 KG/M2 | HEART RATE: 96 BPM | DIASTOLIC BLOOD PRESSURE: 92 MMHG | SYSTOLIC BLOOD PRESSURE: 136 MMHG | TEMPERATURE: 98.9 F | RESPIRATION RATE: 16 BRPM

## 2022-01-08 DIAGNOSIS — Z20.822 SUSPECTED COVID-19 VIRUS INFECTION: Primary | ICD-10-CM

## 2022-01-08 LAB — SARS-COV-2 RNA RESP QL NAA+PROBE: NEGATIVE

## 2022-01-08 PROCEDURE — U0005 INFEC AGEN DETEC AMPLI PROBE: HCPCS | Performed by: NURSE PRACTITIONER

## 2022-01-08 PROCEDURE — 99213 OFFICE O/P EST LOW 20 MIN: CPT | Performed by: NURSE PRACTITIONER

## 2022-01-08 PROCEDURE — U0003 INFECTIOUS AGENT DETECTION BY NUCLEIC ACID (DNA OR RNA); SEVERE ACUTE RESPIRATORY SYNDROME CORONAVIRUS 2 (SARS-COV-2) (CORONAVIRUS DISEASE [COVID-19]), AMPLIFIED PROBE TECHNIQUE, MAKING USE OF HIGH THROUGHPUT TECHNOLOGIES AS DESCRIBED BY CMS-2020-01-R: HCPCS | Performed by: NURSE PRACTITIONER

## 2022-01-08 NOTE — PATIENT INSTRUCTIONS
Patient Education     Viral Diarrhea (Adult)    Diarrhea caused by a virus is often called viral gastroenteritis. Many people call it the stomach flu, but it has nothing to do with the flu. The virus that causes diarrhea affects the stomach and intestinal tract. It often lasts from 2 to 7 days. Diarrhea is the passing of loose, watery stools 3 or more times a day.   Symptoms  Along with diarrhea, you may have these symptoms:    Belly (abdominal) pain and cramping    Nausea and vomiting    Loss of bowel control    Fever and chills    Bloody stools  The danger from repeated diarrhea is dehydration. This is when your body loses too much water and other fluids.   Antibiotics don't work well in treating this illness. But there are things you can do at home that will help.   Home care  Follow these home care tips:    If symptoms are severe, rest at home for the next 24 hours or until you are feeling better.    Wash your hands with soap and water or an alcohol-based . This helps prevent the spread of infection. Wash your hands after touching anyone who is sick.    Teach all people in your home when and how to wash their hands Wet your hands with clean, running water. Lather the backs of your hands, between your fingers, and under your nails. Scrub your hands for at least 20 seconds. If you need a timer, try humming the  Happy Birthday  song from beginning to end twice. Rinse your hands well. Dry them with a clean towel.    Wash your hands after using the toilet and before meals. Clean the toilet after each use.  Food preparation:    People with diarrhea should not make food for others. When making food, wash your hands after touching anyone who is sick.    Wash your hands after using items that have been in contact with raw food. This includes cutting boards, countertops, and knives.    Keep uncooked meats away from cooked and ready-to-eat foods.  Medicines:    You may use acetaminophen or nonsteroidal  anti-inflammatory drugs (NSAIDS) such as ibuprofen or naproxen to control fever unless another medicine was prescribed. In addition:  ? Talk with your healthcare provider before using these medicines if you have chronic liver or kidney disease, or ever had a stomach ulcer or GI (gastrointestinal) bleeding.  ? Don t give aspirin (or medicine that contains aspirin) to anyone younger than age 19 unless directed by the provider. Taking aspirin can put them at risk for Reye syndrome. This is a rare but very serious disorder. It most often affects the brain and the liver.  ? Don't use NSAID medicines if you are already taking one for another condition (such as arthritis) or if you are taking aspirin (such as for heart disease or after a stroke).    Anti-diarrhea medicine should be taken for this condition only if advised by your healthcare provider. Sometimes it can make your condition worse. If you have bloody diarrhea or fever, check with your provider before taking this type of medicine.  Diet:    Water and clear liquids are important so you don't get dehydrated. Drink small amounts at a time. Don't guzzle it down. If you are very dehydrated, sports drinks aren't a good choice. They have too much sugar and not enough electrolytes. In this case, commercially available products called oral rehydration solutions are best.    Caffeine, tobacco, and alcohol can make the diarrhea, cramping, and pain worse. Try to stop using these until you are fully recovered.    Don't force yourself to eat, especially if you have cramping, vomiting, or diarrhea. Don't eat large amounts at a time, even if you are hungry. It may make you feel worse.    If you eat, don't have fatty, greasy, spicy, or fried foods.    Don't have any dairy products, as they can make diarrhea worse.  During the first 24 hours (the first full day) follow the diet below:     Drinks: Water, clear liquids, soft drinks without caffeine; ginger ale, mineral water (plain  or flavored), decaffeinated tea and coffee    Soups: Clear broth, consommé, and bouillon    Desserts: Plain gelatin, ice pops, and fruit juice bars  During the next 24 hours (the second day) you may add these to the above if you are feeling better:     Hot cereal, plain toast, bread, rolls, crackers    Plain noodles, rice, mashed potatoes, chicken noodle or rice soup    Unsweetened canned fruit such as applesauce and bananas (not pineapple and citrus)    Limit fat intake to less than 15 grams per day. Don't eat margarine, butter, oils, mayonnaise, sauces, gravies, fried foods, peanut butter, meat, poultry, and fish.    Limit fiber. Don't eat raw or cooked vegetables, fresh fruits (except bananas), and bran cereals.    Limit caffeine and chocolate. No spices or seasonings except salt.  During the next 24 hours:    Slowly go back to a normal diet, as you feel better and your symptoms ease.    If at any time the diarrhea or cramping gets worse, go back to the simpler diet (above) or to clear liquids.  Follow-up care  Follow up with your healthcare provider, or as advised. Call if you aren't getting better in 24 hours or if the diarrhea lasts more than 1 week. This is even more important if you are in a high-risk group, such as:     Being an older adult    Having a weak immune system (such as from cancer treatment)    Having inflammatory bowel disease (Crohn's disease or colitis)    If a stool (diarrhea) sample was taken, you may call in 2 days (or as directed) for the results.   When to get medical advice  Call your healthcare provider right away if any of these occur:     More belly pain or constant lower right belly pain    Lasting vomiting (can't keep liquids down)    Frequent diarrhea (more than 5 times a day)    Blood in vomit or stool (black or red color)    Eating or drinking less    Dark urine, reduced urine output    Weakness, dizziness    Drowsiness    Fever of 100.4 F (38 C) or higher, or as directed by your  provider    New rash    Symptoms get worse or you have new symptoms  Call 911  Call 911 if any of these occur:     Trouble breathing    Feeling confused    Severe drowsiness or trouble waking up    Fainting or loss of consciousness    Fast heart rate    Seizure    Stiff neck  Theresa last reviewed this educational content on 2/1/2021 2000-2021 [employer]. All rights reserved. This information is not intended as a substitute for professional medical care. Always follow your healthcare professional's instructions.

## 2022-01-08 NOTE — PROGRESS NOTES
Chief Complaint   Patient presents with     Urgent Care     diarrhea, mucus in stool          ICD-10-CM    1. Suspected COVID-19 virus infection  Z20.822 Symptomatic; Unknown COVID-19 Virus (Coronavirus) by PCR Nose   Given the improvement patient is having and symptoms we will simply have her work on rehydration at this point.  She may return to normal diet.  She has had symptoms greater than 5 days so if Covid test is positive she is still able to come out of isolation.  If her symptoms worsen she notices melena or recurrence of diarrhea she will return for further assessment and treatment.    Subjective     Val Gallagher is an 31 year old female who presents to clinic today for diarrhea from 1/2/2022 through 1/7/2022, stools have started to be solid but she is now having mucous in her stools. Took Pepto Bismol a few times. Temp around 100 for a couple days, with chills and sweats, headache. No recently travel, no new foods.     LMP 1/1/2022.    ROS: 10 point ROS neg other than the symptoms noted above in the HPI.       Objective    BP (!) 136/92   Pulse 96   Temp 98.9  F (37.2  C)   Resp 16   Wt 77.1 kg (170 lb)   SpO2 97%   BMI 26.23 kg/m    Nurses notes and VS have been reviewed.    Physical Exam         GENERAL APPEARANCE: healthy appearing, alert     EYES: PERRL, EOMI, sclera non-icteric     HENT: oral exam benign, mucus membranes intact, without ulcers or lesions     NECK: no adenopathy or asymmetry, thyroid normal to palpation     RESP: lungs clear to auscultation - no rales, rhonchi or wheezes     CV: regular rates and rhythm, no murmurs, rubs, or gallop     ABDOMEN:  soft, nontender, no HSM or masses and bowel sounds normal     MS: extremities normal- no gross deformities noted; normal muscle tone.     SKIN: no suspicious lesions or rashes     NEURO: Normal strength and tone, mentation intact and speech normal     PSYCH: normal thought process; no significant mood disturbance    Patient  Instructions     Patient Education     Viral Diarrhea (Adult)    Diarrhea caused by a virus is often called viral gastroenteritis. Many people call it the stomach flu, but it has nothing to do with the flu. The virus that causes diarrhea affects the stomach and intestinal tract. It often lasts from 2 to 7 days. Diarrhea is the passing of loose, watery stools 3 or more times a day.   Symptoms  Along with diarrhea, you may have these symptoms:    Belly (abdominal) pain and cramping    Nausea and vomiting    Loss of bowel control    Fever and chills    Bloody stools  The danger from repeated diarrhea is dehydration. This is when your body loses too much water and other fluids.   Antibiotics don't work well in treating this illness. But there are things you can do at home that will help.   Home care  Follow these home care tips:    If symptoms are severe, rest at home for the next 24 hours or until you are feeling better.    Wash your hands with soap and water or an alcohol-based . This helps prevent the spread of infection. Wash your hands after touching anyone who is sick.    Teach all people in your home when and how to wash their hands Wet your hands with clean, running water. Lather the backs of your hands, between your fingers, and under your nails. Scrub your hands for at least 20 seconds. If you need a timer, try humming the  Happy Birthday  song from beginning to end twice. Rinse your hands well. Dry them with a clean towel.    Wash your hands after using the toilet and before meals. Clean the toilet after each use.  Food preparation:    People with diarrhea should not make food for others. When making food, wash your hands after touching anyone who is sick.    Wash your hands after using items that have been in contact with raw food. This includes cutting boards, countertops, and knives.    Keep uncooked meats away from cooked and ready-to-eat foods.  Medicines:    You may use acetaminophen or  nonsteroidal anti-inflammatory drugs (NSAIDS) such as ibuprofen or naproxen to control fever unless another medicine was prescribed. In addition:  ? Talk with your healthcare provider before using these medicines if you have chronic liver or kidney disease, or ever had a stomach ulcer or GI (gastrointestinal) bleeding.  ? Don t give aspirin (or medicine that contains aspirin) to anyone younger than age 19 unless directed by the provider. Taking aspirin can put them at risk for Reye syndrome. This is a rare but very serious disorder. It most often affects the brain and the liver.  ? Don't use NSAID medicines if you are already taking one for another condition (such as arthritis) or if you are taking aspirin (such as for heart disease or after a stroke).    Anti-diarrhea medicine should be taken for this condition only if advised by your healthcare provider. Sometimes it can make your condition worse. If you have bloody diarrhea or fever, check with your provider before taking this type of medicine.  Diet:    Water and clear liquids are important so you don't get dehydrated. Drink small amounts at a time. Don't guzzle it down. If you are very dehydrated, sports drinks aren't a good choice. They have too much sugar and not enough electrolytes. In this case, commercially available products called oral rehydration solutions are best.    Caffeine, tobacco, and alcohol can make the diarrhea, cramping, and pain worse. Try to stop using these until you are fully recovered.    Don't force yourself to eat, especially if you have cramping, vomiting, or diarrhea. Don't eat large amounts at a time, even if you are hungry. It may make you feel worse.    If you eat, don't have fatty, greasy, spicy, or fried foods.    Don't have any dairy products, as they can make diarrhea worse.  During the first 24 hours (the first full day) follow the diet below:     Drinks: Water, clear liquids, soft drinks without caffeine; ginger ale, mineral  water (plain or flavored), decaffeinated tea and coffee    Soups: Clear broth, consommé, and bouillon    Desserts: Plain gelatin, ice pops, and fruit juice bars  During the next 24 hours (the second day) you may add these to the above if you are feeling better:     Hot cereal, plain toast, bread, rolls, crackers    Plain noodles, rice, mashed potatoes, chicken noodle or rice soup    Unsweetened canned fruit such as applesauce and bananas (not pineapple and citrus)    Limit fat intake to less than 15 grams per day. Don't eat margarine, butter, oils, mayonnaise, sauces, gravies, fried foods, peanut butter, meat, poultry, and fish.    Limit fiber. Don't eat raw or cooked vegetables, fresh fruits (except bananas), and bran cereals.    Limit caffeine and chocolate. No spices or seasonings except salt.  During the next 24 hours:    Slowly go back to a normal diet, as you feel better and your symptoms ease.    If at any time the diarrhea or cramping gets worse, go back to the simpler diet (above) or to clear liquids.  Follow-up care  Follow up with your healthcare provider, or as advised. Call if you aren't getting better in 24 hours or if the diarrhea lasts more than 1 week. This is even more important if you are in a high-risk group, such as:     Being an older adult    Having a weak immune system (such as from cancer treatment)    Having inflammatory bowel disease (Crohn's disease or colitis)    If a stool (diarrhea) sample was taken, you may call in 2 days (or as directed) for the results.   When to get medical advice  Call your healthcare provider right away if any of these occur:     More belly pain or constant lower right belly pain    Lasting vomiting (can't keep liquids down)    Frequent diarrhea (more than 5 times a day)    Blood in vomit or stool (black or red color)    Eating or drinking less    Dark urine, reduced urine output    Weakness, dizziness    Drowsiness    Fever of 100.4 F (38 C) or higher, or as  directed by your provider    New rash    Symptoms get worse or you have new symptoms  Call 911  Call 911 if any of these occur:     Trouble breathing    Feeling confused    Severe drowsiness or trouble waking up    Fainting or loss of consciousness    Fast heart rate    Seizure    Stiff neck  Theresa last reviewed this educational content on 2/1/2021 2000-2021 [employer]. All rights reserved. This information is not intended as a substitute for professional medical care. Always follow your healthcare professional's instructions.               LADY Jacobson, [facility] Provider    The use of [employer] dictation services may have been used to construct the content in this note; any grammatical or spelling errors are non-intentional. Please contact the author of this note directly if you are in need of any clarification.

## 2022-01-08 NOTE — LETTER
January 8, 2022      Val Gallagher  [address] [address]        To Whom It May Concern:    Val Gallagher was seen in our clinic. She may return to work without restrictions.      Sincerely,        Geni Miller, CNP

## 2022-03-04 ENCOUNTER — OFFICE VISIT (OUTPATIENT)
Dept: FAMILY MEDICINE | Facility: CLINIC | Age: 32
End: 2022-03-04
Payer: COMMERCIAL

## 2022-03-04 VITALS
TEMPERATURE: 98 F | RESPIRATION RATE: 18 BRPM | DIASTOLIC BLOOD PRESSURE: 82 MMHG | SYSTOLIC BLOOD PRESSURE: 116 MMHG | HEART RATE: 78 BPM | BODY MASS INDEX: 25.85 KG/M2 | OXYGEN SATURATION: 98 % | HEIGHT: 68 IN

## 2022-03-04 DIAGNOSIS — G47.00 INSOMNIA, UNSPECIFIED TYPE: ICD-10-CM

## 2022-03-04 DIAGNOSIS — F41.9 ANXIETY: ICD-10-CM

## 2022-03-04 DIAGNOSIS — R41.840 POOR CONCENTRATION: ICD-10-CM

## 2022-03-04 DIAGNOSIS — Z00.00 ROUTINE HISTORY AND PHYSICAL EXAMINATION OF ADULT: Primary | ICD-10-CM

## 2022-03-04 PROCEDURE — 99395 PREV VISIT EST AGE 18-39: CPT | Performed by: PHYSICIAN ASSISTANT

## 2022-03-04 RX ORDER — ESCITALOPRAM OXALATE 20 MG/1
20 TABLET ORAL DAILY
Qty: 90 TABLET | Refills: 3 | Status: SHIPPED | OUTPATIENT
Start: 2022-03-04 | End: 2023-03-06

## 2022-03-04 RX ORDER — ATOMOXETINE 40 MG/1
40 CAPSULE ORAL DAILY
Qty: 60 CAPSULE | Refills: 0 | Status: SHIPPED | OUTPATIENT
Start: 2022-03-04 | End: 2022-04-10

## 2022-03-04 RX ORDER — TRAZODONE HYDROCHLORIDE 50 MG/1
50 TABLET, FILM COATED ORAL AT BEDTIME
Qty: 90 TABLET | Refills: 3 | Status: SHIPPED | OUTPATIENT
Start: 2022-03-04 | End: 2023-03-06

## 2022-03-04 RX ORDER — BUPROPION HYDROCHLORIDE 150 MG/1
450 TABLET ORAL EVERY MORNING
Qty: 270 TABLET | Refills: 3 | Status: SHIPPED | OUTPATIENT
Start: 2022-03-04 | End: 2022-10-26

## 2022-03-04 ASSESSMENT — ENCOUNTER SYMPTOMS
HEMATURIA: 0
HEADACHES: 0
JOINT SWELLING: 0
DYSURIA: 0
ABDOMINAL PAIN: 0
EYE PAIN: 0
PALPITATIONS: 0
CHILLS: 0
ARTHRALGIAS: 0
HEMATOCHEZIA: 0
WEAKNESS: 0
HEARTBURN: 0
SHORTNESS OF BREATH: 0
COUGH: 0
DIZZINESS: 0
FEVER: 0
NERVOUS/ANXIOUS: 0
NAUSEA: 0
MYALGIAS: 0
DIARRHEA: 0
CONSTIPATION: 0
PARESTHESIAS: 0
BREAST MASS: 0
FREQUENCY: 0
SORE THROAT: 0

## 2022-03-04 NOTE — PROGRESS NOTES
SUBJECTIVE:   CC: Val Gallagher is an 31 year old woman who presents for preventive health visit.       Patient has been advised of split billing requirements and indicates understanding: Yes  Healthy Habits:     Getting at least 3 servings of Calcium per day:  Yes    Bi-annual eye exam:  NO    Dental care twice a year:  Yes    Sleep apnea or symptoms of sleep apnea:  None    Diet:  Regular (no restrictions)    Frequency of exercise:  2-3 days/week    Duration of exercise:  30-45 minutes    Taking medications regularly:  No    Barriers to taking medications:  Problems remembering to take them    Medication side effects:  None    PHQ-2 Total Score: 1    Additional concerns today:  Yes    Today's PHQ-2 Score:   PHQ-2 (  [employer]) 3/4/2022   Q1: Little interest or pleasure in doing things 0   Q2: Feeling down, depressed or hopeless 1   PHQ-2 Score 1   PHQ-2 Total Score (12-17 Years)- Positive if 3 or more points; Administer PHQ-A if positive -   Q1: Little interest or pleasure in doing things Not at all   Q2: Feeling down, depressed or hopeless Several days   PHQ-2 Score 1       Abuse: Current or Past (Physical, Sexual or Emotional) - No  Do you feel safe in your environment? Yes        Social History     Tobacco Use     Smoking status: Former Smoker     Packs/day: 0.00     Years: 5.00     Pack years: 0.00     Types: Cigarettes, Hookah     Start date: 2011     Quit date: 2016     Years since quittin.5     Smokeless tobacco: Never Used     Tobacco comment: I smoked about a pack a month - only socially/when drinking. I no longer use tobacco.   Substance Use Topics     Alcohol use: Yes     Alcohol/week: 0.0 standard drinks     Comment: Alcohol use 3-5x per week         Alcohol Use 3/4/2022   Prescreen: >3 drinks/day or >7 drinks/week? No   Prescreen: >3 drinks/day or >7 drinks/week? -       Reviewed orders with patient.  Reviewed health maintenance and updated orders accordingly - Yes  BP Readings from  Last 3 Encounters:   03/04/22 116/82   01/08/22 (!) 136/92   01/29/21 118/74    Wt Readings from Last 3 Encounters:   01/08/22 77.1 kg (170 lb)   01/29/21 77.1 kg (170 lb)   01/24/20 75.5 kg (166 lb 8 oz)                  Recent Labs   Lab Test 02/23/22  1006 06/21/17  1150   * 72   HDL 58 95   TRIG 110 85   ALT 82*  --    CR 0.82  --    GFRESTIMATED >90  --    POTASSIUM 4.4  --         Breast Cancer Screening:  Any new diagnosis of family breast, ovarian, or bowel cancer? No    FHS-7:   Breast CA Risk Assessment (FHS-7) 2/1/2022 2/1/2022 2/16/2022 2/16/2022   Did any of your first-degree relatives have breast or ovarian cancer? No No No No   Did any of your relatives have bilateral breast cancer? No No No No   Did any man in your family have breast cancer? No No No No   Did any woman in your family have breast and ovarian cancer? Yes Yes Yes Yes   Did any woman in your family have breast cancer before age 50 y? No No No No   Do you have 2 or more relatives with breast and/or ovarian cancer? Yes Yes Yes Yes   Do you have 2 or more relatives with breast and/or bowel cancer? No No No No       Patient under 40 years of age: Routine Mammogram Screening not recommended.   Pertinent mammograms are reviewed under the imaging tab.    History of abnormal Pap smear: NO - age 30-65 PAP every 5 years with negative HPV co-testing recommended  PAP / HPV 1/24/2020 5/2/2017 5/2/2016   PAP (Historical) NIL NIL NIL     Reviewed and updated as needed this visit by clinical staff   Tobacco  Allergies  Meds   Med Hx  Surg Hx  Fam Hx  Soc Hx        Reviewed and updated as needed this visit by Provider                   Review of Systems   Constitutional: Negative for chills and fever.   HENT: Negative for congestion, ear pain, hearing loss and sore throat.    Eyes: Positive for visual disturbance. Negative for pain.   Respiratory: Negative for cough and shortness of breath.    Cardiovascular: Negative for chest pain,  "palpitations and peripheral edema.   Gastrointestinal: Negative for abdominal pain, constipation, diarrhea, heartburn, hematochezia and nausea.   Breasts:  Negative for tenderness, breast mass and discharge.   Genitourinary: Negative for dysuria, frequency, genital sores, hematuria, pelvic pain, urgency, vaginal bleeding and vaginal discharge.   Musculoskeletal: Negative for arthralgias, joint swelling and myalgias.   Skin: Negative for rash.   Neurological: Negative for dizziness, weakness, headaches and paresthesias.   Psychiatric/Behavioral: Negative for mood changes. The patient is not nervous/anxious.         OBJECTIVE:   /82   Pulse 78   Temp 98  F (36.7  C) (Oral)   Resp 18   Ht 1.727 m (5' 8\")   LMP 02/25/2022 (Approximate)   SpO2 98%   BMI 25.85 kg/m    Physical Exam  GENERAL: healthy, alert and no distress  EYES: Eyes grossly normal to inspection, PERRL and conjunctivae and sclerae normal  HENT: ear canals and TM's normal, nose and mouth without ulcers or lesions  NECK: no adenopathy, no asymmetry, masses, or scars and thyroid normal to palpation  RESP: lungs clear to auscultation - no rales, rhonchi or wheezes  BREAST: normal without masses, tenderness or nipple discharge and no palpable axillary masses or adenopathy  CV: regular rate and rhythm, normal S1 S2, no S3 or S4, no murmur, click or rub, no peripheral edema and peripheral pulses strong  ABDOMEN: soft, nontender, no hepatosplenomegaly, no masses and bowel sounds normal  MS: no gross musculoskeletal defects noted, no edema  SKIN: no suspicious lesions or rashes  NEURO: Normal strength and tone, mentation intact and speech normal  PSYCH: mentation appears normal, affect normal/bright    Diagnostic Test Results:  Labs reviewed in Epic    ASSESSMENT/PLAN:       ICD-10-CM    1. Routine history and physical examination of adult  Z00.00 Adult Mental Health  Referral   2. Anxiety  F41.9 escitalopram (LEXAPRO) 20 MG tablet     " "buPROPion (WELLBUTRIN XL) 150 MG 24 hr tablet   3. Insomnia, unspecified type  G47.00 traZODone (DESYREL) 50 MG tablet   4. Poor concentration  R41.840 atomoxetine (STRATTERA) 40 MG capsule   Poor concentration, will refer to mental health for adhd testing, will try Strattera in the mean time.  Will send pt a message in 1 month to see how it's working.    Patient has been advised of split billing requirements and indicates understanding: Yes    COUNSELING:  Reviewed preventive health counseling, as reflected in patient instructions    Estimated body mass index is 25.85 kg/m  as calculated from the following:    Height as of this encounter: 1.727 m (5' 8\").    Weight as of 1/8/22: 77.1 kg (170 lb).    Weight management plan: Discussed healthy diet and exercise guidelines    She reports that she quit smoking about 5 years ago. Her smoking use included cigarettes and hookah. She started smoking about 10 years ago. She smoked 0.00 packs per day for 5.00 years. She has never used smokeless tobacco.      Counseling Resources:  ATP IV Guidelines  Pooled Cohorts Equation Calculator  Breast Cancer Risk Calculator  BRCA-Related Cancer Risk Assessment: FHS-7 Tool  FRAX Risk Assessment  ICSI Preventive Guidelines  Dietary Guidelines for Americans, 2010  USDA's MyPlate  ASA Prophylaxis  Lung CA Screening    Sonya Solomon PA-C  [facility]  "

## 2022-04-08 DIAGNOSIS — R41.840 POOR CONCENTRATION: ICD-10-CM

## 2022-04-08 NOTE — TELEPHONE ENCOUNTER
Routing refill request to provider for review/approval because:  Drug not on the [facility] refill protocol '      Team please call to schedule 1 month follow up     Gaby Borges RN

## 2022-04-10 RX ORDER — ATOMOXETINE 80 MG/1
80 CAPSULE ORAL DAILY
Qty: 30 CAPSULE | Refills: 3 | Status: SHIPPED | OUTPATIENT
Start: 2022-04-10 | End: 2022-10-26

## 2022-05-31 ENCOUNTER — TELEPHONE (OUTPATIENT)
Dept: FAMILY MEDICINE | Facility: CLINIC | Age: 32
End: 2022-05-31
Payer: COMMERCIAL

## 2022-05-31 DIAGNOSIS — R41.840 POOR CONCENTRATION: Primary | ICD-10-CM

## 2022-05-31 NOTE — TELEPHONE ENCOUNTER
Prior Authorization Retail Medication Request    Medication/Dose: Bupropion XL 150mg  ICD code (if different than what is on RX):    Previously Tried and Failed:    Rationale:      Insurance Name:    Insurance ID:      Pharmacy Information (if different than what is on RX)  Name: [facility]  Phone:

## 2022-05-31 NOTE — TELEPHONE ENCOUNTER
Prior Authorization Retail Medication Request    Medication/Dose: Bupropion XL  ICD code (if different than what is on RX):    Previously Tried and Failed:   Rationale:     Insurance Name: [employer]  Insurance ID:  [id number]      Pharmacy Information (if different than what is on RX)  Name:  NICK Andersen   Phone:  [phone]

## 2022-06-02 RX ORDER — BUPROPION HYDROCHLORIDE 150 MG/1
150 TABLET ORAL EVERY MORNING
Qty: 90 TABLET | Refills: 1 | Status: SHIPPED | OUTPATIENT
Start: 2022-06-02 | End: 2023-03-17

## 2022-06-02 RX ORDER — BUPROPION HYDROCHLORIDE 300 MG/1
300 TABLET ORAL EVERY MORNING
Qty: 90 TABLET | Refills: 1 | Status: SHIPPED | OUTPATIENT
Start: 2022-06-02 | End: 2023-03-17

## 2022-06-02 NOTE — TELEPHONE ENCOUNTER
PRIOR AUTHORIZATION DENIED    Medication: Bupropion XL    Denial Date: 6/2/2022    Denial Rational:                    Appeal Information:    If you would like to appeal, please supply P/A team with a letter of medical necessity with clinical reason.

## 2022-06-02 NOTE — TELEPHONE ENCOUNTER
Central Prior Authorization Team   Phone: [phone]    PA Initiation    Medication: Bupropion XL  Insurance Company: [employer] [place] - Phone [phone] Fax [phone]  Pharmacy Filling the Rx: [facility] - [address] [facility]  Filling Pharmacy Phone: [phone]  Filling Pharmacy Fax:    Start Date: 6/2/2022

## 2022-09-06 ENCOUNTER — VIRTUAL VISIT (OUTPATIENT)
Dept: PSYCHOLOGY | Facility: CLINIC | Age: 32
End: 2022-09-06
Attending: PHYSICIAN ASSISTANT
Payer: COMMERCIAL

## 2022-09-06 DIAGNOSIS — F88 DEVELOPMENTAL MENTAL DISORDER: Primary | ICD-10-CM

## 2022-09-06 DIAGNOSIS — F41.1 GENERALIZED ANXIETY DISORDER: ICD-10-CM

## 2022-09-06 PROCEDURE — 90834 PSYTX W PT 45 MINUTES: CPT | Mod: 95 | Performed by: PSYCHOLOGIST

## 2022-09-06 ASSESSMENT — COLUMBIA-SUICIDE SEVERITY RATING SCALE - C-SSRS
2. HAVE YOU ACTUALLY HAD ANY THOUGHTS OF KILLING YOURSELF?: NO
1. HAVE YOU WISHED YOU WERE DEAD OR WISHED YOU COULD GO TO SLEEP AND NOT WAKE UP?: NO
6. HAVE YOU EVER DONE ANYTHING, STARTED TO DO ANYTHING, OR PREPARED TO DO ANYTHING TO END YOUR LIFE?: NO
ATTEMPT LIFETIME: NO
TOTAL  NUMBER OF INTERRUPTED ATTEMPTS LIFETIME: NO
TOTAL  NUMBER OF ABORTED OR SELF INTERRUPTED ATTEMPTS LIFETIME: NO

## 2022-09-06 ASSESSMENT — ANXIETY QUESTIONNAIRES
6. BECOMING EASILY ANNOYED OR IRRITABLE: SEVERAL DAYS
1. FEELING NERVOUS, ANXIOUS, OR ON EDGE: MORE THAN HALF THE DAYS
3. WORRYING TOO MUCH ABOUT DIFFERENT THINGS: MORE THAN HALF THE DAYS
GAD7 TOTAL SCORE: 11
7. FEELING AFRAID AS IF SOMETHING AWFUL MIGHT HAPPEN: SEVERAL DAYS
GAD7 TOTAL SCORE: 11
5. BEING SO RESTLESS THAT IT IS HARD TO SIT STILL: MORE THAN HALF THE DAYS
2. NOT BEING ABLE TO STOP OR CONTROL WORRYING: SEVERAL DAYS

## 2022-09-06 ASSESSMENT — PATIENT HEALTH QUESTIONNAIRE - PHQ9
5. POOR APPETITE OR OVEREATING: MORE THAN HALF THE DAYS
SUM OF ALL RESPONSES TO PHQ QUESTIONS 1-9: 8
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
## 2022-09-13 ENCOUNTER — VIRTUAL VISIT (OUTPATIENT)
Dept: PSYCHOLOGY | Facility: CLINIC | Age: 32
End: 2022-09-13
Payer: COMMERCIAL

## 2022-09-13 DIAGNOSIS — F88 DEVELOPMENTAL MENTAL DISORDER: Primary | ICD-10-CM

## 2022-09-13 DIAGNOSIS — F41.1 GENERALIZED ANXIETY DISORDER: ICD-10-CM

## 2022-09-13 PROCEDURE — 90791 PSYCH DIAGNOSTIC EVALUATION: CPT | Mod: 95 | Performed by: PSYCHOLOGIST

## 2022-09-13 NOTE — Clinical Note
Lacho Steinberg, here is the finished DA on this patient. Just something to reference when you're going through one for the first time.

## 2022-09-13 NOTE — PROGRESS NOTES
[facility]  Provider Name:  Fartun Clay     Credentials:  VENANCIO Renner    PATIENT'S NAME: Val Gallagher  PREFERRED NAME: Val  PRONOUNS: she/her  MRN: [id number]  : 1990  ADDRESS: [address] [place] [address]  ACCT. NUMBER:  [id number]  DATE OF SERVICE: 22  START TIME: 2:00pm  END TIME: 2:50pm  PREFERRED PHONE: [phone]  SERVICE MODALITY:  Video Visit:      Provider verified identity through the following two step process.  Patient provided:  Patient photo and Patient     Telemedicine Visit: The patient's condition can be safely assessed and treated via synchronous audio and visual telemedicine encounter.      Reason for Telemedicine Visit: Services only offered telehealth    Originating Site (Patient Location): Patient's place of employment    Distant Site (Provider Location): Provider Remote Setting- Home Office    Consent:  The patient/guardian has verbally consented to: the potential risks and benefits of telemedicine (video visit) versus in person care; bill my insurance or make self-payment for services provided; and responsibility for payment of non-covered services.     Patient would like the video invitation sent by:  Send to e-mail at: [email or web address]    Mode of Communication:  Video Conference via [facility]    As the provider I attest to compliance with applicable laws and regulations related to telemedicine.    [place] ADULT Mental Health DIAGNOSTIC ASSESSMENT    Identifying Information:  Patient is a 32 year old,  woman. The pronoun use throughout this assessment reflects the patient's chosen pronoun. Patient was referred for an assessment by Sonya Solomon PA-C. Patient attended the session alone.     Chief Complaint:   Patient reported seeking services at this time for diagnostic assessment and recommendations for treatment. Patient's presenting concerns include: difficulties with inattention.  The patient stated that she  "began asking her mom to be tested for ADHD in early middle school but her mother never liked the idea of prescription medications though this was not followed up on. Specifically, the patient reported experiencing the following symptoms: making careless mistakes/problems attending to details, difficulty sustaining attention, problems listening when spoken to directly, not following through with tasks, difficulty organizing, avoiding tasks that require sustained mental effort, losing things often, being easily distracted and being forgetful.     Patient reported that she has not been assessed for ADHD in the past. Symptoms reportedly began in childhood. The patient reported a history of anxiety that began in adolescence.  She stated that she often has thoughts of \"worst case scenarios\" and catastrophizing.  She reported that she has never been diagnosed with another mental health disorder in the past but did describe some symptoms potentially consistent with depression. Client reported that other professional(s) are involved in providing services, as she was referred by her PCP.    Social/Family History:  Patient reported she grew up in [place], [place]. Patient was the first born of 2 children. There are no known complications during pregnancy or delivery.  The patient stated that her parents  when she was about 5 years old and she primarily lives with her mother.  She would spend every Saturday with her father and every other Friday night until late middle school/early high school.  Her mother met her stepfather when she was 8 years old and they  a few years later. Patient reported no difficulty with childhood peer relationships.  She described her current relationships with her brother, grandmother, and stepfather as as supportive with frequent communication.  However, the patient described her mother as having poor boundaries and having narcissistic traits and she described her father as being " emotionally immature.  She does not have a close relationship with her father and maintains boundaries with her mother.    The patient denied a history of learning disorders, special education programming, and receiving tutoring services.  Patient reported that she sustained a concussion in high school when she was hit in the head with a hockey puck.  She reported LOC for at least 1 second.  As a child, she reported problems focusing in class, being behind on homework, difficulties with organization, frequently talking to peers, procrastinating tasks, and needing to keep everything in her backpack to prevent herself from losing items.  She recalled academic strengths in English and creative subjects as well as weaknesses in social studies, science, and math. The patient's highest education level is graduate school.  After high school, the patient completed a bachelor's degree in criminal justice.  She indicated that this took her about 5 years because she transferred schools a multitude of times.  She found it hard to pay attention and lectures, was procrastinating frequently, and needed to use a browser control to manage her time on social media while trying to do homework.  Overall, the patient described problems with delayed gratification and often wanting to do the fun social activity before completing other tasks.  She then later completed a masters degree in criminal justice and stated that this program was completely online.  The lack of accountability and problems with procrastination continued.  She is currently working on a [facility] certificate.    The patient describes her cultural background as White, American.  Cultural influences and impact on patient's life structure, values, norms, and healthcare: Cartesian. Patient identified her preferred language to be English. Patient reported she does not need the assistance of an  or other support involved in therapy.     Patient is currently  to  her  of 2 years. Patient identified their sexual orientation as heterosexual. Patient reported having zero child(soham). Patient identified , brother, grandmother as part of her support system. Patient identified the quality of these relationships as stable and meaningful.      Patient is staying in own home/apartment. She lives with her  and reported that housing is stable.     Patient is currently employed part-time as an intern.  She described problems with making mistakes in the computer system on client files, procrastinating notes, and being forgetful at work.  She previously worked in corrections for 3 years and had difficulties with procrastinating paperwork.  Her current supervisor has been working with her to help make sure that things are completed in a timely manner. Patient reports finances are obtained through employment and partner.     Patient has not served in the .     Patient reported that she has not been involved with the legal system. Patient denies being on probation / parole / under the jurisdiction of the court.    Patient has received a 's license. Patient reported difficulties resisting the urge to google information while she is driving [place] roads.    Patient's Strengths and Limitations:  Patient identified the following strengths or resources that will help them succeed in treatment: friends / good social support, family support, insight, intelligence, positive work environment, motivation, strong social skills and work ethic. Things that may interfere with the patient's success in treatment include: none identified.     Personal and Family Medical History:  Patient reported the following biological family members or relatives with mental health issues: Mother experienced Depression, Maternal Grandfather experienced Depression and Brother experienced Depression.  Patient previously received the following mental health diagnosis: an Anxiety Disorder.    Patient has received the following mental health services in the past: medication(s) from physician / PCP.   Patient is currently receiving the following services: counseling.  Hospitalizations: None.   Previous/Current commitments: None.     Patient has had a physical exam to rule out medical causes for current symptoms. Date of last physical exam was 3/4/2022. The patient's PCP was Sonya Solomon PA-C.  She has since left the healthcare system so the patient is in need of a new PCP patient reported no current medical concerns. Patient denies any issues with pain.. There are not significant appetite/nutritional concerns/weight changes.    Current Outpatient Medications   Medication     atomoxetine (STRATTERA) 80 MG capsule     buPROPion (WELLBUTRIN XL) 150 MG 24 hr tablet     buPROPion (WELLBUTRIN XL) 150 MG 24 hr tablet     buPROPion (WELLBUTRIN XL) 300 MG 24 hr tablet     escitalopram (LEXAPRO) 20 MG tablet     traZODone (DESYREL) 50 MG tablet     No current facility-administered medications for this visit.       Client reports taking prescribed medications as prescribed.  However, the patient reported that she has run out of her Strattera prescription and her previous PCP has since left [facility].    Patient Allergies: No Known Allergies    Medical History:   Past Medical History:   Diagnosis Date     LSIL (low grade squamous intraepithelial lesion) on Pap smear 8/2013    age 22     Other kyphoscoliosis and scoliosis     thoracolumbar       Family history includes: family history includes Anxiety Disorder in her brother, maternal grandfather, and mother; Breast Cancer in her maternal grandmother; Coronary Artery Disease in her paternal grandfather; Depression in her brother, maternal grandfather, and mother; Diabetes in her paternal grandmother; Obesity in her father and paternal grandfather; Skin Cancer in her maternal grandfather; Sleep Disorder in her mother; Substance Abuse in her  father.    Current Mental Status Exam:   Appearance:  Appropriate    Eye Contact:  Good   Psychomotor:  Normal       Gait / station:  no problem  Attitude / Demeanor: Cooperative   Speech      Rate / Production: Normal/ Responsive Talkative      Volume:  Normal  volume      Language:  intact  Mood:   Normal  Affect:   Appropriate    Thought Content: Clear   Thought Process: Logical       Associations: No loosening of associations  Insight:   Good   Judgment:  Intact   Orientation:  All  Attention/concentration: Good    Rating Scales:  PHQ9:    PHQ-9 SCORE 5/18/2021 2/23/2022 9/6/2022   PHQ-9 Total Score MyChart 5 (Mild depression) 6 (Mild depression) -   PHQ-9 Total Score 5 6 8       GAD7:    GORGE-7 SCORE 4/7/2021 5/18/2021 9/6/2022   Total Score 13 (moderate anxiety) 7 (mild anxiety) -   Total Score 13 7 11       Substance Use:  Patient did report a family history of substance use concerns; see medical history section for details. Patient has not received chemical dependency treatment in the past. Patient has not ever been to detox. Patient is not currently receiving any chemical dependency treatment. Patient reported no problems as a result of her substance use.    Alcohol: Since working on her [employer] certificate, the patient reported that she has naturally decrease her alcohol consumption.  She currently has about 1-2 drinks over the course of a weekend.  Use was heavy in college.  Nicotine: None.  Cannabis: Experimented in the past.  Caffeine: 2 large diet Cokes each morning before going to work, daily coffee.  Street Drugs: None.  Prescription Drugs: None.    CAGE: None of the patient's responses to the CAGE screening were positive / Negative CAGE score     Substance Use: No symptoms    Based on the negative CAGE score and clinical interview there are not indications of drug or alcohol abuse.    Significant Losses/Trauma/Abuse/Neglect Issues:   There are indications or report of significant loss, trauma, abuse or  neglect issues related to: are no indications and client denies any losses, trauma, abuse, or neglect concerns.  Concerns for possible neglect are not present.    Safety Assessment:   [place] Suicide Severity Rating Scale (Lifetime/Recent)  [place] Suicide Severity Rating Scale (Lifetime/Recent)  [place] Suicide Severity Rating (Lifetime/Recent) 9/6/2022   1. Wish to be Dead (Lifetime) 0   2. Non-Specific Active Suicidal Thoughts (Lifetime) 0   Actual Attempt (Lifetime) 0   Has subject engaged in non-suicidal self-injurious behavior? (Lifetime) 0   Interrupted Attempts (Lifetime) 0   Aborted or Self-Interrupted Attempt (Lifetime) 0   Preparatory Acts or Behavior (Lifetime) 0   Calculated C-SSRS Risk Score (Lifetime/Recent) No Risk Indicated     Patient denies current homicidal ideation and behaviors.  Patient denies current self-injurious ideation and behaviors.    Patient denied risk behaviors associated with substance use.  Patient denies any high risk behaviors associated with mental health symptoms.  Patient reports the following current concerns for their personal safety: None.  Patient reports there are not firearms in the house.     History of Safety Concerns:  Patient denied a history of homicidal ideation.     Patient denied a history of personal safety concerns.    Patient denied a history of assaultive behaviors.    Patient denied a history of sexual assault behaviors.     Patient denied a history of risk behaviors associated with substance use.  Patient denies any history of high risk behaviors associated with mental health symptoms.  Patient reports the following protective factors: forward or future oriented thinking; dedication to family or friends; safe and stable environment; regular sleep; regular physical activity; secure attachment; adherence with prescribed medication; living with other people; daily obligations; structured day; positive social skills; strong sense of self worth or esteem;  access to a variety of clinical interventions and pets    Risk Plan:  See Recommendations for Safety and Risk Management Plan below.    Review of Patient-Reported Symptoms:  Depression: Change in sleep, Lack of interest, Change in energy level, Difficulties concentrating and Feeling sad, down, or depressed  Gemma:  No Symptoms  Psychosis: No Symptoms  Anxiety: Excessive worry, Nervousness, Ruminations, Poor concentration and Irritability  Panic:  No symptoms  Post Traumatic Stress Disorder:  Experienced traumatic event (witniessing difficult situations while working as a  in [place])   Eating Disorder: No Symptoms  ADD / ADHD:  Inattentive, Difficulties listening, Poor task completion, Poor organizational skills, Distractibility, Forgetful and Hyperverbal  Conduct Disorder: No symptoms  Autism Spectrum Disorder: No observed symptoms. An autism spectrum disorder diagnosis requires specialized assessment.  Obsessive Compulsive Disorder: Counting and Finger tracing  Patient reports the following compulsive behaviors and treatment history: No symptoms.      Diagnostic Criteria:   F88:  A. A persistent pattern of inattention and/or hyperactivity-impulsivity that interferes with functioning or development, as characterized by (1) and/or (2):   1. Six or more inattention symptoms that have persisted for at least 6 months to a degree that is inconsistent with developmental level and that negatively impacts directly on social and academic/occupational activities.   2. Six or more hyperactivity and impulsivity symptoms that have persisted for at least 6 months to a degree that is inconsistent with developmental level and that negatively impacts directly on social and academic/occupational activities.  B. Several symptoms (inattentive or hyperactive/impulsive) were present before the age of 12 years.  C. Several symptoms (inattentive or hyperactive/impulsive) present in ?2 settings (eg, at home, school, or  work; with friends or relatives; in other activities).  D. There is clear evidence that the symptoms interfere with or reduce the quality of social, academic, or occupational functioning.  E. Symptoms do not occur exclusively during the course of schizophrenia or another psychotic disorder, and are not better explained by another mental disorder (eg, mood disorder, anxiety disorder, dissociative disorder, personality disorder, substance intoxication, or withdrawal).    F41.1:  A. Excessive anxiety and worry, occurring more days than not for at least 6 months about a number of events or activities.   B. The individual finds it difficult to control the worry.  C. The anxiety and worry are associated with 3 or more of 6 symptoms.  D. The anxiety, worry, or physical symptoms cause clinically significant distress or impairment in social, occupational, or other important areas of functioning.  E. The disturbance is not attributable to the physiological effects of a substance (e.g., a drug of abuse, a medication) or another medical condition (e.g., hyperthyroidism).  F. The disturbance is not better explained by another mental disorder (e.g., anxiety or worry about having panic attacks in panic disorder, negative evaluation in social anxiety disorder [social phobia], contamination or other obsessions in obsessive-compulsive disorder, separation from attachment figures in separation anxiety disorder, reminders of traumatic events in posttraumatic stress disorder, gaining weight in anorexia nervosa, physical complaints in somatic symptom disorder, perceived appearance flaws in body dysmorphic disorder, having a serious illness in illness anxiety disorder, or the content of delusional beliefs in schizophrenia or delusional disorder).    Functional Status:  Patient reports the following functional impairments: academic performance, money management, operation of a motor vehicle, organization, relationship(s) and work /  vocational responsibilities.     WHODAS:   WHODAS 2.0 Total Score 9/6/2022   Total Score 31   Total Score MyChart 31     Nonprogrammatic care: Patient is requesting basic services to address current mental health concerns.    Clinical Summary:  1. Reason for assessment: assessing reported deficits in executive functioning (rule in/out ADHD).  2. 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  3. Principal DSM-5 diagnoses (Sustained by DSM5 Criteria Listed Above) and other diagnoses relevant to this service:   1. Developmental mental disorder    2. Generalized anxiety disorder    Rule out OCD  4. Prognosis: Expect Improvement.  5. Likely consequences of symptoms if not treated: Continued difficulties with inattention.  6. Client strengths include: educated, employed, has a previous history of therapy, insightful, intelligent, motivated, support of family, friends and providers and supportive .     Recommendations:   Per medical necessity criteria for psychological testing, patient will complete MMPI-2 before feedback session is scheduled (Kourtney questionnaires completed). Patient was made aware that the MMPI-2 needs to be completed as soon as possible.  If it is not completed within one and two weeks, email reminders will be sent directly.  The patient was also made aware that the link expires after 30 days and if the test is not completed within that timeframe, it will be her responsibility to reinitiate contact to resume the testing process.  My contact information was provided.  Patient was in agreement to this plan.    Plan for Safety and Risk Management:   Recommended that patient call 911 or go to the local ED should there be a change in any of these risk factors..            Report to child / adult protection services was NA.     Resources/Service Plan:    services are  not indicated.   Modifications to assist communication are not indicated.   Additional disability accommodations are not indicated.      Collaboration:   Collaboration/coordination of treatment will be initiated with the referring provider.      Referrals:  A Release of Information is not needed at this time.    Records:  Records were reviewed at time of assessment.   Information in this assessment was obtained from the medical record and provided by patient who is a good historian.      Patient will have open access to their mental health medical record.  Emergency contact located in chart.    Parts of this documentation may have been completed using dictation software. Potential errors may result and are unintentional.       Fartun Clay PsyD, LP  September 13, 2022

## 2022-10-25 ENCOUNTER — VIRTUAL VISIT (OUTPATIENT)
Dept: PSYCHOLOGY | Facility: CLINIC | Age: 32
End: 2022-10-25
Payer: COMMERCIAL

## 2022-10-25 DIAGNOSIS — F42.2 MIXED OBSESSIONAL THOUGHTS AND ACTS: ICD-10-CM

## 2022-10-25 DIAGNOSIS — F90.0 ATTENTION DEFICIT HYPERACTIVITY DISORDER, INATTENTIVE TYPE, MODERATE: Primary | ICD-10-CM

## 2022-10-25 DIAGNOSIS — F41.1 GENERALIZED ANXIETY DISORDER: ICD-10-CM

## 2022-10-25 PROCEDURE — 96131 PSYCL TST EVAL PHYS/QHP EA: CPT | Mod: 95 | Performed by: PSYCHOLOGIST

## 2022-10-25 PROCEDURE — 96130 PSYCL TST EVAL PHYS/QHP 1ST: CPT | Mod: 95 | Performed by: PSYCHOLOGIST

## 2022-10-25 NOTE — PROGRESS NOTES
[facility]   Mental Health & Addiction Services     Progress Note - ADHD Feedback Session     Patient Name: Val Gallagher  Date: 2022       Service Type:  Individual       Session Start Time: 5:00pm  Session End Time:  5:23pm     Session Length: 23 minutes    Session #: 3    Attendees: Patient attended alone    Service Modality: Video Visit:      Provider verified identity through the following two step process.  Patient provided:  Patient  and Patient address    Telemedicine Visit: The patient's condition can be safely assessed and treated via synchronous audio and visual telemedicine encounter.      Reason for Telemedicine Visit: Services only offered telehealth    Originating Site (Patient Location): Patient's home    Distant Site (Provider Location): Provider Remote Setting- Home Office    Consent:  The patient/guardian has verbally consented to: the potential risks and benefits of telemedicine (video visit) versus in person care; bill my insurance or make self-payment for services provided; and responsibility for payment of non-covered services.     Patient would like the video invitation sent by:  Send to e-mail at: [email or web address]    Mode of Communication:  Video Conference via [employer]    Distant Location (Provider):  On-site    As the provider I attest to compliance with applicable laws and regulations related to telemedicine.      PHQ 2021   PHQ-9 Total Score 5 6 8   Q9: Thoughts of better off dead/self-harm past 2 weeks Not at all Not at all Not at all       GORGE-7 SCORE 2021   Total Score 13 (moderate anxiety) 7 (mild anxiety) -   Total Score 13 7 11         DATA      Progress Since Last Session (Related to Symptoms / Goals / Homework):   Symptoms: Stable.    Homework: Completed.      Treatment Objective(s) Addressed in This Session:   Provided feedback on ADHD  evaluation. Reviewed test results in depth. Plan of care and recommendations were discussed based on testing data. See full report attached on secondary note in this encounter.     Intervention:   Provided feedback to patient regarding testing results, diagnoses, and treatment recommendations. Test results are consistent with an ADHD diagnosis. Symptoms are not better explained by an anxiety disorder. Personalized suggestions regarding symptoms were offered. Patient had the opportunity to ask questions; she expressed understanding.        ASSESSMENT: Current Emotional / Mental Status (status of significant symptoms):   Risk status (Self / Other harm or suicidal ideation)   Patient denies current fears or concerns for personal safety.   Patient denies current or recent suicidal ideation or behaviors.   Patient denies current or recent homicidal ideation or behaviors.   Patient denies current or recent self injurious behavior or ideation.   Patient denies other safety concerns.   Patient reports there has been no change in risk factors since their last session.     Patient reports there has been no change in protective factors since their last session.     Recommended that patient call 911 or go to the local ED should there be a change in any of these risk factors.     Appearance:   Appropriate    Eye Contact:   Good    Psychomotor Behavior: Normal    Attitude:   Cooperative    Orientation:   All   Speech    Rate / Production: Normal     Volume:  Normal    Mood:    Normal   Affect:    Appropriate    Thought Content:  Clear    Thought Form:  Coherent  Logical    Insight:    Good      Medication Review:   No current psychiatric medications prescribed     Medication Compliance:   NA     Changes in Health Issues:   None reported     Chemical Use Review:   Substance Use: Chemical use reviewed, no active concerns identified      Nicotine Use: No current tobacco use.      Diagnosis:  1. Attention deficit hyperactivity  disorder, inattentive type, moderate    2. Generalized anxiety disorder    3. Mixed obsessional thoughts and acts        PLAN:   Recommendations are outlined in full evaluation report (attached to this encounter).   Patient indicated understanding and will contact the clinic if there are further questions.    Parts of this documentation may have been completed using dictation software. Potential errors may result and are unintentional.       Fartun Clay PsyD, LP  Clinical Psychologist         Psychological Testing Services Summary       Testing Evaluation Services Base: 93450  (first 60 mins) Add-on: 35043  (each addtl 60 mins)   Record Review and Clarify Referral Question   1:50pm-2:00pm on 9/6/2022 10 minutes   Clinical Decision Making/Battery Modification   1:45pm-2:00pm on 9/13/2022 15 minutes   Integration/Report Generation   10:00am-11:00am on 10/23/2022 ([facility])  11:00am-11:45am on 10/23/2022 ([facility])  11:45am-12:45pm on 10/23/2022 (Final Report)   60 minutes  45 minutes  60 minutes   Interactive Feedback Session   5:00pm-5:23pm on 10/25/2022 23 minutes   Post-Service Work   5:23pm-5:38pm on 10/25/2022 15 minutes   Total Time: 228 minutes   Total Units: 1 3       Diagnoses:   F90.0 Attention-Deficit/Hyperactivity Disorder, inattentive presentation, moderate  F41.1 Generalized Anxiety Disorder  F42.2 OCD

## 2022-10-25 NOTE — PROGRESS NOTES
"    Psychological Assessment Report    Patient: Val Gallagher  YOB: 1990  MRN: [id number]  Date(s) of assessment: 9/6/2022 and 9/13/2022  Referral Source: [facility]  Reason for Referral: assessing reported deficits in executive functioning     IDENTIFYING INFORMATION AND BRIEF HISTORY OF PRESENTING PROBLEM: Val Gallagher is a 32-year-old White woman who presented to the initial diagnostic intake appointments on 9/6/2022 and 9/13/2022 (see diagnostic intake dated 9/13/2022) due to primary concerns with difficulties managing time, problems completing tasks at work, and feeling overwhelmed.  The patient reported that in early middle school, she was asking her mother to be tested but because her mother \"never liked the idea of medications,\" and evaluation was never pursued.    As a child, the patient reported consistently procrastinating chores and homework.  She indicated that her  and closed her a box to prevent her from talking to other students in class and being disruptive.  She recalled poking holes in that box in an effort to talk to other students anyway.  She further described a situation in which her grandmother picked her up from school and her teacher used that as an opportunity to have a conversation with her grandmother about being behind with coursework.  Organization was also reportedly a problem, as her friends would refer to her mass as a \"hopeless pile.\"  After high school, the patient attended college at [employer] before transferring to a private school, then transferring to [employer], and then returning to [employer] to complete her bachelor's degree.  This took about 5 years total.  She indicated that she was raised in a [employer] household and her first exposure to [employer] was jarring, prompting her to transfer to a [employer] private school.  However, that was also jarring for her, so she " transitioned again.  While completing her bachelor's degree, the patient reported being more focused on her social life and having problems with delayed gratification.  She then completed a masters degree in criminal justice which was completely online.  She indicated that her friends would hold her accountable for completing homework and would not hang out with her until it was completed.  As result, she procrastinated frequently.  She is currently working on her [employer] certificate and is on internship.  Completing notes has reportedly been difficult.  She previously worked in corrections for 3 years and paperwork was procrastinated in that environment as well.  Currently, the patient reported experiencing the following symptoms: Making careless mistakes/not paying attention to details (missing button clicks in client files), difficulty sustaining attention (needing to reread, needing to use audiobooks, difficulties paying attention to lectures), does not listen when spoken to directly (zones out during conversations), does not follow through with tasks, difficulty organizing (has a planner but it has problems using it), avoids/dislikes tasks that require sustained mental effort (procrastinates homework), loses things often, is easily distracted, is forgetful (needs lists and reminders, recently forgot about a mandatory Zoom meeting for school), and talks excessively.  The patient is seeking diagnostic clarification and updated treatment recommendations.    Mental Health History: The patient reported that anxiety symptoms began in adolescence and she became more conscious of it about 7 years ago.  She reported catastrophizing and reviewing worst-case scenarios in her mind.  She is currently attending individual psychotherapy to help manage those symptoms.  The patient also recalled a history of repetitive behaviors/compulsions.  She described needing to draw certain shapes on a surface with her finger and feeling as  "though she needs to count words in a sentence in order to and the sentence after a certain number of words.  She reported that this does significantly interfere with her life.  The patient denied a history of depressive symptoms, manic symptoms, social anxiety, phobic responses, and perceptual difficulties. The patient denied issues with sexual compulsivity, gambling, and disordered eating.    Developmental History: The patient reported that she believes that she is the product of a full-term pregnancy and there were no complications during her mother's pregnancy or birth. The patient reported that she believes she met all of her developmental milestones on time. She denied a history of tutoring services, learning disorders, and special educational programming. She stated that she sustained one head injury in high school when she was hit with a hockey puck. LOC for 1 ssecond. The patient recalled academic strengths in English and creative courses as well as weaknesses in social studies, math, and science.  Patient reported that her mother and father  when she was about 5 years old.  She spent occasional time at her father's house until late middle school/early high school.  She described her father as \"emotionally immature\" and having a more distant relationship with him.  However, the patient reported that her mother also had poor boundaries with her and fostered to close of her relationship.  She reported currently having positive relationships with her younger brother and grandmother.    Chemical Dependence/Substance Abuse History: The patient denied a history of chemical or substance abuse.     SOURCES OF DATA/ASSESSMENT: Review of medical and psychiatric records, consideration of behavioral observations during the testing (if applicable), and the results of the psychological tests are all considered in the preparation of this psychological test report. It is important to note that test results comprise " a hypothesis of the patient s mental health concerns and are not an independent or conclusive assessment. Test results are combined with the patient s available medical, psychological, behavioral data for an integrated interpretation and report. Due to virtual/remote administration, certain aspects of the assessment process were impacted, such as access to direct patient observation, and maintaining an environment conducive to testing. As such, external factors have the potential to affect the validity of data collected.    TESTS ADMINISTERED:    Kourtney Adult ADHD Rating Scale-IV: Self and Other Reports (BAARS-IV)    Kourtney Functional Impairment Scale: Self and Other Reports (BFIS)    Kourtney Deficits in Executive Functioning Scale (BDEFS)    Generalized Anxiety Disorder Questionnaire (GORGE-7)    Patient Health Questionnaire- 9 (PHQ-9)    [place] Multiphasic Personality Inventory - 2 (MMPI - 2)     BEHAVIORAL OBSERVATIONS: The patient was pleasant and cooperative throughout all interview and explanation of testing process. The patient was oriented to person, place, and time. Mood was neutral. Eye contact was adequate and speech was at normal rate and rhythm. Motor activity was appropriate. Due to virtual/remote administration, direct patient observation was not possible during the testing process, and it is unknown if the patient was able to maintain an environment conducive to testing. As such, external factors have the potential to affect the validity of data collected.     TEST RESULTS: Test results comprise a hypothesis of the patient s mental health concerns and are not an independent or conclusive assessment. Test results are combined with the patient s available medical, psychological, behavioral, and observational data for an integrated interpretation and report.    Kourtney Adult ADHD Rating Scale-IV: Self and Other Reports (BAARS-IV)  The BAARS-IV assesses for symptoms of ADHD that are experienced in  "one's daily life. This assessment measure includes self and collateral rating scales designed to provide information regarding current and childhood symptoms of ADHD including inattention, hyperactivity, and impulsivity. Self-report scores are reported as percentiles. Scores at the 76th-83rd percentile are considered marginal, scores at the 84th-92nd percentile are considered borderline, scores at the 93rd-95th percentile are considered mild, scores at the 96th-98th percentile are considered moderate, and those at the 99th percentile are considered severe. Collateral or \"other\" rating scales are reported as number of symptoms observed in comparison to those reported by the client. Norms and percentile scores are not available for collateral reports.     Current Symptoms Scale--Self Report:   Client completed the self-report inventory of current symptoms. The results indicate that the client's Total ADHD Score was 57 which places the patient in the 99+ percentile for overall ADHD symptoms. In addition, the client endorsed 7/9 (98th percentile) Inattention symptoms, 7/9 (98th percentile) Hyperactivity-Impulsivity symptoms, and 6/9 (96th percentile) Sluggish Cognitive Tempo symptoms. Client indicated that the reported symptoms have resulted in impaired functioning in school, home, work, and social relationships. Overall, the results suggest the client is experiencing severe ADHD symptoms.     Current Symptoms Scale--Other Report:  Client's  completed the collateral report inventory of current symptoms. Based on the collateral contact's observation of symptoms, the client demonstrates 5/9 Inattention symptoms, 0/5 Hyperactivity symptoms, 2/4 Impulsivity symptoms, and 5/9 Sluggish Cognitive Tempo symptoms. The client's Total ADHD Score was 40. The collateral contact indicated the client demonstrates impaired functioning in school, home, and social relationships.  The collateral- and self-report scores are " "significantly different.    Childhood Symptoms Scale--Self-Report:  Client completed the self-report inventory of childhood symptoms. The results indicate that the client's Total ADHD Score was 40 which places the patient in the 87th percentile for overall ADHD symptoms in childhood. In addition, the client endorsed 3/9 (85th percentile) Inattention symptoms and 2/9 (83rd percentile) Hyperactivity-Impulsivity symptoms. Client indicated that the reported symptoms resulted in impaired functioning in school, home, and social relationships. Overall, the results suggest the client experienced borderline symptoms of ADHD as a child.     Childhood Symptoms Scale--Other Report:  Client's mother completed the collateral report inventory of childhood symptoms. Based on the collateral contact's recollection of client's childhood symptoms, the client demonstrated 4/9 Inattention symptoms and 3/9 Hyperactivity-Impulsivity symptoms. The client's Total ADHD Score was 37. The collateral contact indicated the client demonstrates impaired functioning in school and at home.  The collateral- and self-report scores are not significantly different.                           Kourtney Functional Impairment Scale: Self and Other Reports (BFIS)  The BFIS is used to assess an individuals' psychosocial impairment in major life/daily activities that may be due to a mental health disorder. This assessment measure includes self and collateral rating scales. Self-report scores are reported as percentiles. Scores at the 76th-83rd percentile are considered marginal, scores at the 84th-92nd percentile are considered borderline, scores at the 93rd-95th percentile are considered mild, scores at the 96th-98th percentile are considered moderate, and those at the 99th percentile are considered severe. Collateral or \"other\" rating scales are reported as number of symptoms observed in comparison to those reported by the client. Norms and percentile scores " "are not available for collateral reports.     Results indicate the client identified impairment (scores at or greater than 93rd percentile) in the following areas: money management.  The client's Mean Impairment Score was 3.5 (51-75th percentile) indicating the client is reporting 7% impairment in functioning across domains. Client's  completed the collateral rating scale, which indicated similar results. The collateral contact's scores were generally lower than the client's report.     Kourtney Deficits in Executive Functioning Scale (BDEFS)  The BDEFS is a measure used for evaluating dimensions of adult executive functioning in daily life. This assessment measure includes self and collateral rating scales. Self-report scores are reported as percentiles. Scores at the 76th-83rd percentile are considered marginal, scores at the 84th-92nd percentile are considered borderline, scores at the 93rd-95th percentile are considered mild, scores at the 96th-98th percentile are considered moderate, and those at the 99th percentile are considered severe. Collateral or \"other\" rating scales are reported as number of symptoms observed in comparison to those reported by the client. Norms and percentile scores are not available for collateral reports.     Results indicate the client's Total Executive Functioning Score was 190 (90th percentile). The ADHD-Executive Functioning Index score was 23 (86th percentile). These scores suggest the client has borderline deficits in executive functioning. Results indicate the client identified significant deficits in the following areas: self-management to time (severe deficits), self-organization/problem-solving (marginal deficits), self-restraint (no significant deficits), self-motivation (mild deficits) and self-regulation of emotions (no significant deficits). Client's  completed the collateral rating scale, which indicated similar results.     Generalized Anxiety Disorder " Questionnaire (GORGE-7)  This questionnaire is designed to assess for anxiety in adults. Based on the score, the patient is experiencing moderate symptoms of anxiety. Client identified the following symptoms of anxiety: feeling on edge/nervous/anxious, difficulty controlling worry, worrying about many different things, trouble relaxing, being restless, becoming easily annoyed or irritable, and feeling something awful might happen.    Patient Health Questionnaire- 9 (PHQ-9)   This questionnaire is designed to assess for depression in adults. Based on the score, the patient is experiencing mild symptoms of depression. Client identified the following symptoms of depression: depressed mood, difficulty with sleep, feeling tired or having little energy, poor appetite or overeating, feeling bad about self, and poor concentration.    [place] Multiphasic Personality Inventory - 2 (MMPI-2)    The MMPI-2 was administered to evaluate current level of emotional distress. Validity profile indicates that the patient appears to have answered in a generally straightforward and consistent manner, and obtained results are considered to be reliable and valid in representing current psychological status. No items were omitted.      Emotional Well-being: At this time, the patient may be experiencing significant anxiety symptoms.  For her, these may manifest as general nervousness and fatigue.  She may have some health concerns that are further negatively impacting her emotional state.    Cognitions: The patient probably engages in maladaptive rumination.  She probably has self-critical thoughts and a tendency to focus on the negatives.    Behaviors: At times, the patient may be hesitant and inhibited.  At other times, she may experience accelerated thought and speech processes, engage in purposeless activity, and have a low tolerance for boredom.    Interpersonal Style: In her relationships, the patient is probably sociable and friendly.   She would likely describe herself as an extrovert and confident in social settings.  Internally, she may feel sensitive.    SUMMARY: Val Gallagher is a 32-year-old White woman who completed psychological testing remotely/virtually due to the COVID-19 pandemic. Testing was requested to provide updated diagnostic clarification and necessary treatment recommendations.    Patient first completed a diagnostic interview in which mental health symptoms, ADHD symptoms, and background information was gathered. Patient self-reported nine symptoms of inattention and indicated that her abilities to function effectively at home and work are significantly impaired. Further, her self-reported symptoms on Kourtney measures of ADHD symptoms were consistent with this information. Both the patient and her mother recalled symptoms      An objective measure of personality indicated that the patient is likely experiencing anxiety symptoms at this time, consistent with self-reported information that she was diagnosed with anxiety in the past. It is likely that her difficulties with attention, concentration, and memory are increasing and/or exacerbated by these mental health symptoms. Given history of inattention symptoms that have persisted through time, current problems can be conceptualized as having a neurodevelopmental basis. See recommendations below.       Referral Question Response: DSM-5 criteria for ADHD:   A. Symptom Count - Are there sufficient symptoms for the diagnosis? Yes, patient did endorse sufficient inattention symptoms.   B. Onset - Were several symptoms present before 13 years of age? Yes, a significant number of symptoms reportedly began at age 6.   C. Pervasiveness - Are several symptoms present in at least two settings? Yes, patient reported that symptoms are problematic at home, school, and work.   D. Impairment - Do symptoms interfere with or reduce the quality of functioning? Yes, patient is unable to complete  daily tasks effectively.   E. Exclusions - Are symptoms better explained by another disorder or factor? No, symptoms are not better explained by anxiety symptoms. Difficulties are explained by an organic basis of inattention.     The patient meets the following DSM-5 criteria for generalized anxiety disorder:  A. Excessive anxiety and worry, occurring more days than not for at least 6 months about a number of events or activities.   B. The individual finds it difficult to control the worry.  C. The anxiety and worry are associated with 3 or more of 6 symptoms.  D. The anxiety, worry, or physical symptoms cause clinically significant distress or impairment in social, occupational, or other important areas of functioning.  E. The disturbance is not attributable to the physiological effects of a substance (e.g., a drug of abuse, a medication) or another medical condition (e.g., hyperthyroidism).  F. The disturbance is not better explained by another mental disorder.    The patient also meets the following DSM-5 criteria for obsessive compulsive disorder:  A. Presence of obsessions, compulsions, or both:  Obsessions are defined by (1) and (2):   1.  Recurrent and persistent thoughts, urges, or images that are experienced, at some time during the disturbance, as intrusive and unwanted, and that in most individuals cause marked anxiety or distress.   2.  The individual tends to ignore or suppress such thoughts, urges, or images, or to neutralize them with some other thought or action.  Compulsions are defined by (1) and (2):   1.  Repetitive behaviors or mental acts that the individual feels driven to perform in response to an obsession or according to rules that must be applied rigidly.   2.  The behaviors or mental acts are aimed at preventing or reducing anxiety or distress, or preventing some dreaded event or situation; however, these behaviors or mental acts are not connected in a realistic way with what they are  designed to neutralize or prevent, or are clearly excessive.  B. The obsessions or compulsions are time-consuming or caused clinically significant distress or impairment in social, occupational, or other important areas of functioning.  C. The obsessive-compulsive symptoms are not attributable to the physiological effects of a substance or other medical condition.  D. The disturbance is not better explained by the symptoms of another mental disorder.    DIAGNOSES:  F90.0 Attention-Deficit/Hyperactivity Disorder, inattentive presentation, moderate  F41.1 Generalized Anxiety Disorder  F42.2 OCD    PLAN OF CARE:  1. Establish care with a new PCP and discuss the following (referring provider left practice):  a. Consider a trial of a stimulant medication. This may help alleviate some of the patient s attentional symptoms.   b. Consider a trial of a psychotropic medication. This may help alleviate some of the patient s anxiety symptoms.     2. Continue working in individual psychotherapy.    RECOMMENDATIONS:  1. Due to the patient s reported attention, concentration, and mood difficulties, the following health/lifestyle changes when combined, can significantly improve symptoms:   a. Avoid simple carbohydrates at breakfast. Aim for only complex carbohydrates and lean protein for your morning meal.   b. Engage in aerobic exercise 3 times per week for 30 minutes, ensuring that your heart rate stays within your training zone. Further, reading the book,  Spark,  by Lázaro Dutta M.D can help the patient understand the benefits of exercise on the brain.   c. Research suggest that taking a high-quality multi-vitamin and antioxidant (1/2 cup of blueberries) daily in conjunction with balanced nutrition can be helpful.  d. Aim for the high end of daily water intake: around 72 ounces per day.  e. Ensure regular meals and snacks to maintain optimal attention.    2. The following may be beneficial in managing some of the patient s  attention and concentration difficulties:  a. Due to the patient s difficulties with attention and concentration, consider working in a completely distraction-free area while completing tasks. Workspaces should be completely clear except for the materials needed for the current task. Both visual and auditory distractions should be decreased as much as possible.  b. Considering decreased ability to focus and maintain attention, it is recommended that the patient take frequent breaks while completing tasks. This will help to maintain attention and effort. The patient may benefit from the use of a [employer] Timer. The timer works by using built-in break times. After working on a task consistently for 25 minutes, the timer reminds the user to take a five-minute break before continuing, etc. A [employer] timer can be downloaded as a free terence to a phone or tablet.  c. Due to the patient s attentional and concentration symptoms, it is recommended to increase organization with the use of lists and calendars. Significantly increasing structure to the day and adhering to a set schedule can increase your ability to complete responsibilities, track deadline, etc. Breaking these tasks down into their component parts and recording them in a calendar/planner will likely be beneficial. Patient would benefit from setting feasible timelines for completion of activities. By establishing clear priorities for completing tasks, you can more likely complete the most important tasks first. The patient may also choose to elect to a friend or family member to help hold them accountable.    3. Avoid multitasking. Attempting to work on multiple tasks and projects the same increases the likelihood that an error will occur. Focus on one task at a time.    4. The patient may benefit from engaging in mindfulness practices. This may include breathing techniques, apps that provide guided meditation, or more interactive activities such as  "coloring.    5. Develop a \"coping skills jar/box.\" This entails designating a certain container to hold slips of paper with distraction technique ideas written on each slip of paper. Distraction techniques may include listening to a certain type of music, playing on game on your phone, doing a breathing exercise, spending time with a pet, calling a certain individual, looking at a magazine, working on a puzzle, etc. When feeling distressed, choose a slip of paper from the container and engage in that activity rather than focusing on the problem.    6. See the attached tip sheet for more ideas as well as online and book resources.       Fartun Clay PsyD, LP  Clinical Psychologist   "

## 2022-10-25 NOTE — PATIENT INSTRUCTIONS
Coping with Attention-Deficit/Hyperactivity Disorder (ADHD)    If you have ADHD, it can be hard to sit still, pay attention or control impulsive behavior. ADHD can cause problems at home, at school, at work and in social settings. But you can learn ways to control your symptoms. Below are some ideas for coping with the most common ADHD problems.     Memory, Focus, and Managing Time  Be mindful of time. Use a watch, phone, timer, alarm, PDA or computer--anything that keeps accurate time that you can see and hear at all times. Set more than one alarm to remind you how much time you have left for a task. Give yourself more time than you think you need. For important appointments or deadlines, set reminder alarms hours or days ahead of time.   Use a day planner. A day planner can help you manage time and remember responsibilities. Learning to use a planner is just like learning to use any tool--practice makes perfect.   Use lists. Lists and notes can help you keep track of regular tasks, projects, deadlines and appointments. If you decide to use a daily planner, keep all lists and notes inside of it. Use color codes for tasks so you know which ones are the most important.  Learn to say no and set boundaries. Do not take on too many projects or social engagements. Overbooking yourself can be overwhelming and can lead to missed commitments.  Repeat out loud the instructions you have been given. This will help you remember, as well as let others correct you if needed.    Organization  Create space. Ask yourself what you need on a daily basis. Then, find storage bins or closets for things you don t need every day. Have specific areas for things like keys, bills and other items that are easy to misplace. Throw away or donate things you don t need.   Deal with it now. You can avoid forgetfulness, clutter and procrastination by doing things right away, not some time in the future. This includes filing papers, cleaning up  messes, opening and responding to mail and returning phone calls.  Set up a filing system. Use dividers or separate file folders for different types of documents, such as medical records, receipts and pay stubs. Label and color-code your files so that you can quickly find what you need.   Break larger tasks into small, manageable pieces. Write down the steps needed to complete the task. Follow each step in order until the task is done. If needed, take small, timed breaks and return to finish the task.  Organize your work space. Use lists or planners to organize your day. Remove clutter from your desk. Label any storage bins. Reduce distraction as much as possible. Ideas include sitting facing the wall, closing your door or using a white noise machine.    Sitting Still  Move around as needed. Don t fight the urge to move around. If you need to fidget or stand up for a period of time to help maintain attention, then do so. But take care that you re not interrupting others. You may also find it helpful to squeeze a stress ball.  Be active in a useful way. Exercise can burn off extra energy, relieve stress, boost your mood and calm your mind. Meditation, yoga or nat chi can help you better control your attention and impulses.  Stay healthy. Get enough sleep. Avoid caffeine late in the day. Exercise. Create a relaxing bedtime routine. Stick to a schedule or daily routine. Eat small meals throughout the day. Avoid sugar, eat fewer carbohydrates and eat more protein.     Close Relationships  Talk to your loved ones about ADHD. There are many resources available that can help your loved one understand ADHD. See the Resources section below.  Divide daily tasks based on each person s strengths. For example, if you have trouble with organization, you may not want to do financial planning tasks.  If you have trouble with focus, develop a sign that others in your life can use as a gentle reminder to pay attention. The sign  should be small but meaningful to you and the other person.  Improve communication. Use a dry erase board in a common area to help the whole family stay in touch better. Keep regular to-do lists and compare scheduled activities every day. Writing notes to each other is also very helpful.  Be an active listener and try not to interrupt. If you find your mind wandering when others are talking, mentally repeat their words so you can follow the conversation. Ask questions and ask people to repeat what they said if needed. Pay close attention to body language.   Organize your thoughts. If you need to have a serious conversation, write a list of the points you would like to make or the important ideas you want to talk about. This can help you stay focused and remember what you need to say.  Think before speaking. It can be hard to control your impulses when you feel strongly about something. Before saying whatever pops into your head, stop to ask yourself  Will this be helpful?  or  Will this help me get what I want?   Take charge of your life. Work to accept that some things are harder for you. Make a plan to address these troubles and seek the support you need.    Online Resources  ADD Warehouse. http://www.Groopie.com  ADHD and You.  Tips for Adults with ADHD.  http://www.adhdandyou.com/adhd-patient/adhd-tips-young-adult/  Attention Deficit Disorder Association. http://www.add.org  Attention Deficit Disorder Resources. http://www.addresources.org  Children and Adults with Attention-Deficit/Hyperactivity Disorder (ELSIE). http://www.elsie.org/  Christina Andres.  33 Ways to Get Organized with Adult ADHD.  http://www.additudemag.com/adhd/article/729.html  National Resource Center on ADHD. http://www.dsnd2chcl.org/  Martha Carlson.  Daily Living Tips for Adult ADHD.  http://www.medicinenet.com/living_tips_adult_adhd_pictures_slideshow/article.htm  Clinton Cage and Anamaria Calvo.  Help for Adult ADD / ADHD.   http://www.helpguide.org/mental/adhd_add_adult_strategies.htm  You can also find many apps for your phone that can help you with common ADHD struggles    Book Resources  Hu Oconnell. ADHD in Adults. (2008).  Hu Oconnell. Taking Charge of Adult ADHD. (2010).  Dom Dutta. Delivered from Distraction. (2006).  Dom Dutta. Driven to Distraction. (2011).  Etta Strickland. ADD in the Workplace. (1997).  Etta Strickland. ADD-Friendly Ways to Organize Your Life. (2002).  Sydnie Sharp. The ADHD Effect on Marriage: Understand and Rebuild Your Relationship in Six Steps. (2010).  Riana Lemos and Rosalina Cheek. You Mean I m Not Lazy, Stupid or Crazy? (2006).  Umesh Wren. Understand Your Brain, Get More Done: The ADHD Executive Functions Workbook. (2012).    Support Groups  ADHD Adult Support Group  [address].  7:00 to 8:30 p.m., last Thursday of each month (except December).  Contact/RSVP: [email or web address]    ADHD Adult Support Group  [address].  Thursday 10:00 a.m. to 12:00 p.m. or 7:00 to 9:30 p.m.  Contact/RSVP: Onel Fernando. [phone] or [email or web address]     ADHD Adult Support Group for Spouses/Partners of adults with ADHD  [address].  Tuesday 12:00 to 2:00 p.m.   Contact/RSVP: Onel Fernando. [phone] or [email or web address]

## 2022-10-26 ENCOUNTER — VIRTUAL VISIT (OUTPATIENT)
Dept: FAMILY MEDICINE | Facility: CLINIC | Age: 32
End: 2022-10-26
Payer: COMMERCIAL

## 2022-10-26 DIAGNOSIS — F41.1 GAD (GENERALIZED ANXIETY DISORDER): Primary | ICD-10-CM

## 2022-10-26 DIAGNOSIS — F90.0 ATTENTION DEFICIT HYPERACTIVITY DISORDER (ADHD), PREDOMINANTLY INATTENTIVE TYPE: ICD-10-CM

## 2022-10-26 DIAGNOSIS — G47.00 INSOMNIA, UNSPECIFIED TYPE: ICD-10-CM

## 2022-10-26 PROCEDURE — 99204 OFFICE O/P NEW MOD 45 MIN: CPT | Mod: 95 | Performed by: INTERNAL MEDICINE

## 2022-10-26 RX ORDER — DEXTROAMPHETAMINE SACCHARATE, AMPHETAMINE ASPARTATE, DEXTROAMPHETAMINE SULFATE AND AMPHETAMINE SULFATE 2.5; 2.5; 2.5; 2.5 MG/1; MG/1; MG/1; MG/1
10 TABLET ORAL DAILY
Qty: 30 TABLET | Refills: 0 | Status: SHIPPED | OUTPATIENT
Start: 2022-10-26 | End: 2022-11-28

## 2022-10-26 NOTE — PROGRESS NOTES
Val is a 32 year old who is being evaluated via a billable video visit.      How would you like to obtain your AVS? MyChart  If the video visit is dropped, the invitation should be resent by: Text to cell phone: [phone]  Will anyone else be joining your video visit? No      Assessment & Plan     GORGE (generalized anxiety disorder)  On wellbutrin 450 mg daily and lexapro 20 mg daily.    Attention deficit hyperactivity disorder (ADHD), predominantly inattentive type  ADHD per neuropsych eval done by psychologist  She has taken Strattera in the past but experienced severe dry mouth.   Discussed CSA and UDS at next visit   - amphetamine-dextroamphetamine (ADDERALL) 10 MG tablet; Take 1 tablet (10 mg) by mouth daily    Insomnia, unspecified type  Stable. Cont meds    See Patient Instructions    Return in about 3 months (around 1/26/2023) for Follow up, Routine preventive, with me.    MISA SOSA MD  [facility]    Devang Neal is a 32 year old, presenting for the following health issues:  No chief complaint on file.    Val is a very pleasant 32 year old lady who had a video visit today.  She is here to establish care  She has GORGE, insomnia, ADHD      History of Present Illness       Reason for visit:  To establish primary care & discuss medication options for recent ADHD diagnosis    She eats 2-3 servings of fruits and vegetables daily.She consumes 0 sweetened beverage(s) daily.She exercises with enough effort to increase her heart rate 20 to 29 minutes per day.  She exercises with enough effort to increase her heart rate 4 days per week.   She is taking medications regularly.     Review of Systems       Objective       Vitals:  No vitals were obtained today due to virtual visit.    Physical Exam   GEN: No acute distress  RESP: No audible increased work of breathing. Patient speaking in full sentences without distress.  PSYCH: pleasant  Exam otherwise limited due to virtual  platform        Video-Visit Details    Video Start Time: 5: 30 pm    Type of service:  Video Visit    Video End Time:5:47 pm    Originating Location (pt. Location): Home    Distant Location (provider location):  On-site    Platform used for Video Visit: Becca

## 2022-11-20 ENCOUNTER — HEALTH MAINTENANCE LETTER (OUTPATIENT)
Age: 32
End: 2022-11-20

## 2022-11-28 ENCOUNTER — MYC REFILL (OUTPATIENT)
Dept: FAMILY MEDICINE | Facility: CLINIC | Age: 32
End: 2022-11-28

## 2022-11-28 DIAGNOSIS — F90.0 ATTENTION DEFICIT HYPERACTIVITY DISORDER (ADHD), PREDOMINANTLY INATTENTIVE TYPE: ICD-10-CM

## 2022-11-30 RX ORDER — DEXTROAMPHETAMINE SACCHARATE, AMPHETAMINE ASPARTATE, DEXTROAMPHETAMINE SULFATE AND AMPHETAMINE SULFATE 2.5; 2.5; 2.5; 2.5 MG/1; MG/1; MG/1; MG/1
10 TABLET ORAL 2 TIMES DAILY
Qty: 60 TABLET | Refills: 0 | Status: SHIPPED | OUTPATIENT
Start: 2022-11-30 | End: 2022-12-30

## 2023-01-15 ENCOUNTER — MYC MEDICAL ADVICE (OUTPATIENT)
Dept: FAMILY MEDICINE | Facility: CLINIC | Age: 33
End: 2023-01-15

## 2023-01-15 DIAGNOSIS — F90.0 ATTENTION DEFICIT HYPERACTIVITY DISORDER (ADHD), PREDOMINANTLY INATTENTIVE TYPE: Primary | ICD-10-CM

## 2023-01-16 RX ORDER — DEXTROAMPHETAMINE SACCHARATE, AMPHETAMINE ASPARTATE, DEXTROAMPHETAMINE SULFATE AND AMPHETAMINE SULFATE 2.5; 2.5; 2.5; 2.5 MG/1; MG/1; MG/1; MG/1
10 TABLET ORAL 2 TIMES DAILY
Qty: 60 TABLET | Refills: 0 | Status: SHIPPED | OUTPATIENT
Start: 2023-01-16 | End: 2023-02-22

## 2023-01-16 NOTE — TELEPHONE ENCOUNTER
See CyOpticst message from pt.    Pended pharmacy if wanting to send something different.    Last script was 10mg bid, immediate release.    Amberly Montaño, RN  [facility] RN Triage Team

## 2023-02-22 ENCOUNTER — MYC REFILL (OUTPATIENT)
Dept: FAMILY MEDICINE | Facility: CLINIC | Age: 33
End: 2023-02-22
Payer: COMMERCIAL

## 2023-02-22 DIAGNOSIS — F90.0 ATTENTION DEFICIT HYPERACTIVITY DISORDER (ADHD), PREDOMINANTLY INATTENTIVE TYPE: ICD-10-CM

## 2023-02-23 RX ORDER — DEXTROAMPHETAMINE SACCHARATE, AMPHETAMINE ASPARTATE, DEXTROAMPHETAMINE SULFATE AND AMPHETAMINE SULFATE 2.5; 2.5; 2.5; 2.5 MG/1; MG/1; MG/1; MG/1
10 TABLET ORAL 2 TIMES DAILY
Qty: 20 TABLET | Refills: 0 | Status: SHIPPED | OUTPATIENT
Start: 2023-02-23 | End: 2023-03-08

## 2023-02-23 NOTE — TELEPHONE ENCOUNTER
Needs an appointment. Sent a small supply of adderall. I can see her anytime next week. In person - annual physical.

## 2023-02-23 NOTE — TELEPHONE ENCOUNTER
Pt scheduled for 03/06/2023, last preventative visit on 03/04/2022.    Ayanna BALDERAS    [facility]

## 2023-03-06 ENCOUNTER — OFFICE VISIT (OUTPATIENT)
Dept: FAMILY MEDICINE | Facility: CLINIC | Age: 33
End: 2023-03-06
Payer: COMMERCIAL

## 2023-03-06 VITALS
HEART RATE: 89 BPM | BODY MASS INDEX: 27.28 KG/M2 | SYSTOLIC BLOOD PRESSURE: 124 MMHG | TEMPERATURE: 98.9 F | WEIGHT: 180 LBS | HEIGHT: 68 IN | OXYGEN SATURATION: 96 % | DIASTOLIC BLOOD PRESSURE: 81 MMHG

## 2023-03-06 DIAGNOSIS — F90.0 ATTENTION DEFICIT HYPERACTIVITY DISORDER (ADHD), PREDOMINANTLY INATTENTIVE TYPE: ICD-10-CM

## 2023-03-06 DIAGNOSIS — G47.00 INSOMNIA, UNSPECIFIED TYPE: ICD-10-CM

## 2023-03-06 DIAGNOSIS — F41.9 ANXIETY: ICD-10-CM

## 2023-03-06 DIAGNOSIS — Z79.899 CONTROLLED SUBSTANCE AGREEMENT SIGNED: ICD-10-CM

## 2023-03-06 DIAGNOSIS — Z12.4 CERVICAL CANCER SCREENING: ICD-10-CM

## 2023-03-06 DIAGNOSIS — Z23 NEED FOR PROPHYLACTIC VACCINATION AND INOCULATION AGAINST INFLUENZA: ICD-10-CM

## 2023-03-06 DIAGNOSIS — Z00.00 ANNUAL PHYSICAL EXAM: Primary | ICD-10-CM

## 2023-03-06 LAB
AMPHETAMINES UR QL: DETECTED
ANION GAP SERPL CALCULATED.3IONS-SCNC: 10 MMOL/L (ref 7–15)
BARBITURATES UR QL SCN: NOT DETECTED
BENZODIAZ UR QL SCN: NOT DETECTED
BUN SERPL-MCNC: 8.7 MG/DL (ref 6–20)
BUPRENORPHINE UR QL: NOT DETECTED
CALCIUM SERPL-MCNC: 9.8 MG/DL (ref 8.6–10)
CANNABINOIDS UR QL: NOT DETECTED
CHLORIDE SERPL-SCNC: 106 MMOL/L (ref 98–107)
CHOLEST SERPL-MCNC: 167 MG/DL
COCAINE UR QL SCN: NOT DETECTED
CREAT SERPL-MCNC: 0.79 MG/DL (ref 0.51–0.95)
D-METHAMPHET UR QL: NOT DETECTED
DEPRECATED HCO3 PLAS-SCNC: 25 MMOL/L (ref 22–29)
ERYTHROCYTE [DISTWIDTH] IN BLOOD BY AUTOMATED COUNT: 11.8 % (ref 10–15)
GFR SERPL CREATININE-BSD FRML MDRD: >90 ML/MIN/1.73M2
GLUCOSE SERPL-MCNC: 92 MG/DL (ref 70–99)
HCT VFR BLD AUTO: 39.1 % (ref 35–47)
HDLC SERPL-MCNC: 50 MG/DL
HGB BLD-MCNC: 12.9 G/DL (ref 11.7–15.7)
LDLC SERPL CALC-MCNC: 97 MG/DL
MCH RBC QN AUTO: 30.8 PG (ref 26.5–33)
MCHC RBC AUTO-ENTMCNC: 33 G/DL (ref 31.5–36.5)
MCV RBC AUTO: 93 FL (ref 78–100)
METHADONE UR QL SCN: NOT DETECTED
NONHDLC SERPL-MCNC: 117 MG/DL
OPIATES UR QL SCN: NOT DETECTED
OXYCODONE UR QL SCN: NOT DETECTED
PCP UR QL SCN: NOT DETECTED
PLATELET # BLD AUTO: 304 10E3/UL (ref 150–450)
POTASSIUM SERPL-SCNC: 4.5 MMOL/L (ref 3.4–5.3)
PROPOXYPH UR QL: NOT DETECTED
RBC # BLD AUTO: 4.19 10E6/UL (ref 3.8–5.2)
SODIUM SERPL-SCNC: 141 MMOL/L (ref 136–145)
TRICYCLICS UR QL SCN: NOT DETECTED
TRIGL SERPL-MCNC: 99 MG/DL
WBC # BLD AUTO: 7.1 10E3/UL (ref 4–11)

## 2023-03-06 PROCEDURE — 85027 COMPLETE CBC AUTOMATED: CPT | Performed by: INTERNAL MEDICINE

## 2023-03-06 PROCEDURE — 99395 PREV VISIT EST AGE 18-39: CPT | Mod: 25 | Performed by: INTERNAL MEDICINE

## 2023-03-06 PROCEDURE — 80306 DRUG TEST PRSMV INSTRMNT: CPT | Performed by: INTERNAL MEDICINE

## 2023-03-06 PROCEDURE — 99214 OFFICE O/P EST MOD 30 MIN: CPT | Mod: 25 | Performed by: INTERNAL MEDICINE

## 2023-03-06 PROCEDURE — 80048 BASIC METABOLIC PNL TOTAL CA: CPT | Performed by: INTERNAL MEDICINE

## 2023-03-06 PROCEDURE — 36415 COLL VENOUS BLD VENIPUNCTURE: CPT | Performed by: INTERNAL MEDICINE

## 2023-03-06 PROCEDURE — 80061 LIPID PANEL: CPT | Performed by: INTERNAL MEDICINE

## 2023-03-06 PROCEDURE — G0145 SCR C/V CYTO,THINLAYER,RESCR: HCPCS | Performed by: INTERNAL MEDICINE

## 2023-03-06 PROCEDURE — 90471 IMMUNIZATION ADMIN: CPT | Performed by: INTERNAL MEDICINE

## 2023-03-06 PROCEDURE — 90686 IIV4 VACC NO PRSV 0.5 ML IM: CPT | Performed by: INTERNAL MEDICINE

## 2023-03-06 PROCEDURE — 87624 HPV HI-RISK TYP POOLED RSLT: CPT | Performed by: INTERNAL MEDICINE

## 2023-03-06 RX ORDER — TRAZODONE HYDROCHLORIDE 50 MG/1
50 TABLET, FILM COATED ORAL AT BEDTIME
Qty: 90 TABLET | Refills: 3 | Status: SHIPPED | OUTPATIENT
Start: 2023-03-06 | End: 2024-03-19

## 2023-03-06 RX ORDER — ESCITALOPRAM OXALATE 20 MG/1
20 TABLET ORAL DAILY
Qty: 90 TABLET | Refills: 3 | Status: SHIPPED | OUTPATIENT
Start: 2023-03-06 | End: 2024-03-18

## 2023-03-06 ASSESSMENT — ENCOUNTER SYMPTOMS
COUGH: 0
ARTHRALGIAS: 0
ABDOMINAL PAIN: 0
JOINT SWELLING: 0
CONSTIPATION: 0
SHORTNESS OF BREATH: 0
HEARTBURN: 0
CHILLS: 0
PARESTHESIAS: 0
HEMATOCHEZIA: 0
NAUSEA: 0
DIARRHEA: 0
DIZZINESS: 0
FEVER: 0
EYE PAIN: 0
HEADACHES: 0
NERVOUS/ANXIOUS: 0
PALPITATIONS: 0
DYSURIA: 0
BREAST MASS: 0
HEMATURIA: 0
SORE THROAT: 0
WEAKNESS: 0
MYALGIAS: 0
FREQUENCY: 0

## 2023-03-06 ASSESSMENT — PAIN SCALES - GENERAL: PAINLEVEL: NO PAIN (0)

## 2023-03-06 NOTE — PROGRESS NOTES
SUBJECTIVE:   CC: Val is an 32 year old who presents for preventive health visit.   Patient has been advised of split billing requirements and indicates understanding: Yes  Healthy Habits:     Getting at least 3 servings of Calcium per day:  Yes    Bi-annual eye exam:  NO    Dental care twice a year:  Yes    Sleep apnea or symptoms of sleep apnea:  None    Diet:  Regular (no restrictions)    Frequency of exercise:  2-3 days/week    Duration of exercise:  15-30 minutes    Taking medications regularly:  No    Barriers to taking medications:  None    Medication side effects:  None    PHQ-2 Total Score: 1    Additional concerns today:  Aliyah Neal is a very pleasant 32 year old female who presented to the clinic for APE.  She has ADHD, takes adderall 10 mg bid.  She has anxiety and depression - on wellbutrin 450 mg and lexapro 20 mg   Breast cancer in maternal GM, early 60's.   DM in paternal grandmother   CABG in paternal grandfather.     She loves her job and lives with her .     Today's PHQ-2 Score:   PHQ-2 (  [employer]) 3/6/2023   Q1: Little interest or pleasure in doing things 1   Q2: Feeling down, depressed or hopeless 0   PHQ-2 Score 1   PHQ-2 Total Score (12-17 Years)- Positive if 3 or more points; Administer PHQ-A if positive -   Q1: Little interest or pleasure in doing things Several days   Q2: Feeling down, depressed or hopeless Not at all   PHQ-2 Score 1     Social History     Tobacco Use     Smoking status: Former     Years: 5.00     Types: Cigarettes, Hookah     Start date: 2011     Quit date: 2016     Years since quittin.5     Smokeless tobacco: Never     Tobacco comments:     I smoked about a pack a month - only socially/when drinking. I no longer use tobacco.   Substance Use Topics     Alcohol use: Yes     Comment: Alcohol use 1-3 drinks per week     Alcohol Use 3/6/2023   Prescreen: >3 drinks/day or >7 drinks/week? No   No flowsheet data found.    Reviewed orders with  patient.  Reviewed health maintenance and updated orders accordingly - Yes  Lab work is in process    Breast Cancer Screening:    FHS-7:   Breast CA Risk Assessment (FHS-7) 2/1/2022 2/1/2022 2/16/2022 2/16/2022 3/6/2023   Did any of your first-degree relatives have breast or ovarian cancer? No No No No No   Did any of your relatives have bilateral breast cancer? No No No No Unknown   Did any man in your family have breast cancer? No No No No No   Did any woman in your family have breast and ovarian cancer? Yes Yes Yes Yes Yes   Did any woman in your family have breast cancer before age 50 y? No No No No No   Do you have 2 or more relatives with breast and/or ovarian cancer? Yes Yes Yes Yes Yes   Do you have 2 or more relatives with breast and/or bowel cancer? No No No No No     Patient under 40 years of age: Routine Mammogram Screening not recommended.   Pertinent mammograms are reviewed under the imaging tab.    History of abnormal Pap smear: NO - age 30-65 PAP every 5 years with negative HPV co-testing recommended  PAP / HPV 1/24/2020 5/2/2017 5/2/2016   PAP (Historical) NIL NIL NIL     Review of Systems       OBJECTIVE:   There were no vitals taken for this visit.  Physical Exam  Vitals reviewed.   Constitutional:       Appearance: Normal appearance.   HENT:      Right Ear: Tympanic membrane normal.      Left Ear: Tympanic membrane normal.      Mouth/Throat:      Mouth: Mucous membranes are moist.      Pharynx: Oropharynx is clear. No oropharyngeal exudate or posterior oropharyngeal erythema.   Cardiovascular:      Rate and Rhythm: Normal rate and regular rhythm.      Heart sounds: Normal heart sounds. No murmur heard.    No gallop.   Pulmonary:      Effort: Pulmonary effort is normal. No respiratory distress.      Breath sounds: Normal breath sounds. No stridor. No wheezing, rhonchi or rales.   Chest:   Breasts:     Right: No swelling, bleeding, inverted nipple, mass, nipple discharge, skin change or tenderness.       Left: No swelling, bleeding, inverted nipple, mass, nipple discharge, skin change or tenderness.   Abdominal:      General: Abdomen is flat. Bowel sounds are normal. There is no distension.      Palpations: There is no mass.      Tenderness: There is no abdominal tenderness. There is no guarding.      Hernia: No hernia is present.   Genitourinary:     Vagina: Normal.      Cervix: Normal.   Musculoskeletal:         General: Normal range of motion.      Cervical back: Normal range of motion and neck supple. No rigidity or tenderness.      Right lower leg: No edema.      Left lower leg: No edema.   Lymphadenopathy:      Cervical: No cervical adenopathy.   Skin:     General: Skin is warm and dry.   Neurological:      General: No focal deficit present.      Mental Status: She is alert.   Psychiatric:         Mood and Affect: Mood normal.         ASSESSMENT/PLAN:   Val was seen today for physical and imm/inj.    Diagnoses and all orders for this visit:    Annual physical exam  -     CBC with Platelets (Today); Future  -     Basic metabolic panel; Future  -     Lipid Profile; Future    Cervical cancer screening  -     Pap Screen with HPV - recommended age 30 - 65 years    Anxiety  Stable.  Continue medication  -     escitalopram (LEXAPRO) 20 MG tablet; Take 1 tablet (20 mg) by mouth daily    Insomnia, unspecified type  -     traZODone (DESYREL) 50 MG tablet; Take 1 tablet (50 mg) by mouth At Bedtime    Attention deficit hyperactivity disorder (ADHD), predominantly inattentive type  Stable.  CSA signed.  UDS done today.    Controlled substance agreement signed  -     Drug Abuse Screen Panel 13, Urine (Pain Care Package) - lab collect; Future  -     Drug Abuse Screen Panel 13, Urine (Pain Care Package) - lab collect    Need for prophylactic vaccination and inoculation against influenza  -     INFLUENZA VACCINE IM > 6 MONTHS VALENT IIV4 (AFLURIA/FLUZONE)      Patient has been advised of split billing requirements and  indicates understanding: Yes    COUNSELING:  Reviewed preventive health counseling, as reflected in patient instructions       Immunizations    Vaccinated for: Influenza      She reports that she quit smoking about 6 years ago. Her smoking use included cigarettes and hookah. She started smoking about 11 years ago. She has never used smokeless tobacco.      MISA SOSA MD  [facility]

## 2023-03-06 NOTE — LETTER
[facility]  03/06/23  Patient: Val Gallagher  YOB: 1990  Medical Record Number: [id number]                                                                                  Non-Opioid Controlled Substance Agreement    This is an agreement between you and your provider regarding safe and appropriate use of controlled substances prescribed by your care team. Controlled substances are?medicines that can cause physical and mental dependence (abuse).     There are strict laws about having and using these medicines. We here at [facility] are  committed to working with you in your efforts to get better. To support you in this work, we'll help you schedule regular office appointments for medicine refills. If we must cancel or change your appointment for any reason, we'll make sure you have enough medicine to last until your next appointment.     As a Provider, I will:     Listen carefully to your concerns while treating you with respect.     Recommend a treatment plan that I believe is in your best interest and may involve therapies other than medicine.      Talk with you often about the possible benefits and the risk of harm of any medicine that we prescribe for you.    Assess the safety of this medicine and check how well it works.      Provide a plan on how to taper (discontinue or go off) using this medicine if the decision is made to stop its use.      ::  As a Patient, I understand controlled substances:       Are prescribed by my care provider to help me function or work and manage my condition(s).?    Are strong medicines and can cause serious side effects.       Need to be taken exactly as prescribed.?Combining controlled substances with certain medicines or chemicals (such as illegal drugs, alcohol, sedatives, sleeping pills, and benzodiazepines) can be dangerous or even fatal.? If I stop taking my medicines suddenly, I may have severe withdrawal symptoms.     The risks,  benefits, and side effects of these medicine(s) were explained to me. I agree that:    1. I will take part in other treatments as advised by my care team. This may be psychiatry or counseling, physical therapy, behavioral therapy, group treatment or a referral to specialist.    2. I will keep all my appointments and understand this is part of the monitoring of controlled substances.?My care team may require an office visit for EVERY controlled substance refill. If I miss appointments or don t follow instructions, my care team may stop my medicine    3. I will take my medicines as prescribed. I will not change the dose or schedule unless my care team tells me to. There will be no refills if I run out early.      4. I may be asked to come to the clinic and complete a urine drug test or complete a pill count. If I don t give a urine sample or participate in a pill count, the care team may stop my medicine.    5. I will only receive controlled substance prescriptions from this clinic. If I am treated by another provider, I will tell them that I am taking controlled substances and that I have a treatment agreement with this provider. I will inform my [facility] care team within one business day if I am given a prescription for any controlled substance by another healthcare provider. My [facility] care team can contact other providers and pharmacists about my use of any medicines.    6. It is up to me to make sure that I don't run out of my medicines on weekends or holidays.?If my care team is willing to refill my prescription without a visit, I must request refills only during office hours. Refills may take up to 3 business days to process. I will use one pharmacy to fill all my controlled substance prescriptions. I will notify the clinic about any changes to my insurance or medicine availability.    7. I am responsible for my prescriptions. If the medicine/prescription is lost, stolen or destroyed, it will  not be replaced.?I also agree not to share controlled substance medicines with anyone.     8. I am aware I should not use any illegal or recreational drugs. I agree not to drink alcohol unless my care team says I can.     9. If I enroll in the [place] Medical Cannabis program, I will tell my care team before my next refill.    10. I will tell my care team right away if I become pregnant, have a new medical problem treated outside of my regular clinic, or have a change in my medicines.     11. I understand that this medicine can affect my thinking, judgment and reaction time.? Alcohol and drugs affect the brain and body, which can affect the safety of my driving. Being under the influence of alcohol or drugs can affect my decision-making, behaviors, personal safety and the safety of others. Driving while impaired (DWI) can occur if a person is driving, operating or in physical control of a car, motorcycle, boat, snowmobile, ATV, motorbike, off-road vehicle or any other motor vehicle ([place] Statute 169[place].20). I understand the risk if I choose to drive or operate any vehicle or machinery.    I understand that if I do not follow any of the conditions above, my prescriptions or treatment may be stopped or changed.   I agree that my provider, clinic care team and pharmacy may work with any city, state or federal law enforcement agency that investigates the misuse, sale or other diversion of my controlled medicine. I will allow my provider to discuss my care with, or share a copy of, this agreement with any other treating provider, pharmacy or emergency room where I receive care.     I have read this agreement and have asked questions about anything I did not understand.    ________________________________________________________  Patient Signature - Val Gallagher     ___________________                   Date     ________________________________________________________  Provider Signature - MISA SOSA MD        ___________________                   Date     ________________________________________________________  Witness Signature (required if provider not present while patient signing)          ___________________                   Date

## 2023-03-07 ENCOUNTER — MYC REFILL (OUTPATIENT)
Dept: FAMILY MEDICINE | Facility: CLINIC | Age: 33
End: 2023-03-07
Payer: COMMERCIAL

## 2023-03-07 DIAGNOSIS — F90.0 ATTENTION DEFICIT HYPERACTIVITY DISORDER (ADHD), PREDOMINANTLY INATTENTIVE TYPE: ICD-10-CM

## 2023-03-07 RX ORDER — DEXTROAMPHETAMINE SACCHARATE, AMPHETAMINE ASPARTATE, DEXTROAMPHETAMINE SULFATE AND AMPHETAMINE SULFATE 2.5; 2.5; 2.5; 2.5 MG/1; MG/1; MG/1; MG/1
10 TABLET ORAL 2 TIMES DAILY
Qty: 20 TABLET | Refills: 0 | Status: CANCELLED | OUTPATIENT
Start: 2023-03-07

## 2023-03-08 LAB
BKR LAB AP GYN ADEQUACY: NORMAL
BKR LAB AP GYN INTERPRETATION: NORMAL
BKR LAB AP HPV REFLEX: NORMAL
BKR LAB AP PREVIOUS ABNORMAL: NORMAL
PATH REPORT.COMMENTS IMP SPEC: NORMAL
PATH REPORT.COMMENTS IMP SPEC: NORMAL
PATH REPORT.RELEVANT HX SPEC: NORMAL

## 2023-03-08 RX ORDER — DEXTROAMPHETAMINE SACCHARATE, AMPHETAMINE ASPARTATE, DEXTROAMPHETAMINE SULFATE AND AMPHETAMINE SULFATE 2.5; 2.5; 2.5; 2.5 MG/1; MG/1; MG/1; MG/1
10 TABLET ORAL 2 TIMES DAILY
Qty: 60 TABLET | Refills: 0 | Status: SHIPPED | OUTPATIENT
Start: 2023-04-08 | End: 2023-05-08

## 2023-03-08 RX ORDER — DEXTROAMPHETAMINE SACCHARATE, AMPHETAMINE ASPARTATE, DEXTROAMPHETAMINE SULFATE AND AMPHETAMINE SULFATE 2.5; 2.5; 2.5; 2.5 MG/1; MG/1; MG/1; MG/1
10 TABLET ORAL 2 TIMES DAILY
Qty: 60 TABLET | Refills: 0 | Status: SHIPPED | OUTPATIENT
Start: 2023-03-08 | End: 2023-04-09

## 2023-03-08 RX ORDER — DEXTROAMPHETAMINE SACCHARATE, AMPHETAMINE ASPARTATE, DEXTROAMPHETAMINE SULFATE AND AMPHETAMINE SULFATE 2.5; 2.5; 2.5; 2.5 MG/1; MG/1; MG/1; MG/1
10 TABLET ORAL 2 TIMES DAILY
Qty: 60 TABLET | Refills: 0 | Status: SHIPPED | OUTPATIENT
Start: 2023-05-09 | End: 2023-06-08

## 2023-03-10 LAB
HUMAN PAPILLOMA VIRUS 16 DNA: NEGATIVE
HUMAN PAPILLOMA VIRUS 18 DNA: NEGATIVE
HUMAN PAPILLOMA VIRUS FINAL DIAGNOSIS: NORMAL
HUMAN PAPILLOMA VIRUS OTHER HR: NEGATIVE

## 2023-04-09 ENCOUNTER — MYC REFILL (OUTPATIENT)
Dept: FAMILY MEDICINE | Facility: CLINIC | Age: 33
End: 2023-04-09
Payer: COMMERCIAL

## 2023-04-09 DIAGNOSIS — F90.0 ATTENTION DEFICIT HYPERACTIVITY DISORDER (ADHD), PREDOMINANTLY INATTENTIVE TYPE: ICD-10-CM

## 2023-04-10 RX ORDER — DEXTROAMPHETAMINE SACCHARATE, AMPHETAMINE ASPARTATE, DEXTROAMPHETAMINE SULFATE AND AMPHETAMINE SULFATE 2.5; 2.5; 2.5; 2.5 MG/1; MG/1; MG/1; MG/1
10 TABLET ORAL 2 TIMES DAILY
Qty: 60 TABLET | Refills: 0 | Status: SHIPPED | OUTPATIENT
Start: 2023-04-10 | End: 2023-05-11

## 2023-05-11 ENCOUNTER — MYC REFILL (OUTPATIENT)
Dept: FAMILY MEDICINE | Facility: CLINIC | Age: 33
End: 2023-05-11
Payer: COMMERCIAL

## 2023-05-11 DIAGNOSIS — F90.0 ATTENTION DEFICIT HYPERACTIVITY DISORDER (ADHD), PREDOMINANTLY INATTENTIVE TYPE: ICD-10-CM

## 2023-05-11 RX ORDER — DEXTROAMPHETAMINE SACCHARATE, AMPHETAMINE ASPARTATE, DEXTROAMPHETAMINE SULFATE AND AMPHETAMINE SULFATE 2.5; 2.5; 2.5; 2.5 MG/1; MG/1; MG/1; MG/1
10 TABLET ORAL 2 TIMES DAILY
Qty: 60 TABLET | Refills: 0 | Status: SHIPPED | OUTPATIENT
Start: 2023-05-11 | End: 2023-06-11

## 2023-06-11 ENCOUNTER — MYC REFILL (OUTPATIENT)
Dept: FAMILY MEDICINE | Facility: CLINIC | Age: 33
End: 2023-06-11
Payer: COMMERCIAL

## 2023-06-11 DIAGNOSIS — F90.0 ATTENTION DEFICIT HYPERACTIVITY DISORDER (ADHD), PREDOMINANTLY INATTENTIVE TYPE: ICD-10-CM

## 2023-06-12 RX ORDER — DEXTROAMPHETAMINE SACCHARATE, AMPHETAMINE ASPARTATE, DEXTROAMPHETAMINE SULFATE AND AMPHETAMINE SULFATE 2.5; 2.5; 2.5; 2.5 MG/1; MG/1; MG/1; MG/1
10 TABLET ORAL 2 TIMES DAILY
Qty: 60 TABLET | Refills: 0 | Status: SHIPPED | OUTPATIENT
Start: 2023-06-12 | End: 2023-07-14

## 2023-07-14 ENCOUNTER — MYC REFILL (OUTPATIENT)
Dept: FAMILY MEDICINE | Facility: CLINIC | Age: 33
End: 2023-07-14
Payer: COMMERCIAL

## 2023-07-14 DIAGNOSIS — F90.0 ATTENTION DEFICIT HYPERACTIVITY DISORDER (ADHD), PREDOMINANTLY INATTENTIVE TYPE: ICD-10-CM

## 2023-07-17 RX ORDER — DEXTROAMPHETAMINE SACCHARATE, AMPHETAMINE ASPARTATE, DEXTROAMPHETAMINE SULFATE AND AMPHETAMINE SULFATE 2.5; 2.5; 2.5; 2.5 MG/1; MG/1; MG/1; MG/1
10 TABLET ORAL 2 TIMES DAILY
Qty: 60 TABLET | Refills: 0 | Status: SHIPPED | OUTPATIENT
Start: 2023-07-17 | End: 2023-08-18

## 2023-08-18 ENCOUNTER — MYC REFILL (OUTPATIENT)
Dept: FAMILY MEDICINE | Facility: CLINIC | Age: 33
End: 2023-08-18
Payer: COMMERCIAL

## 2023-08-18 DIAGNOSIS — F90.0 ATTENTION DEFICIT HYPERACTIVITY DISORDER (ADHD), PREDOMINANTLY INATTENTIVE TYPE: ICD-10-CM

## 2023-08-20 RX ORDER — DEXTROAMPHETAMINE SACCHARATE, AMPHETAMINE ASPARTATE, DEXTROAMPHETAMINE SULFATE AND AMPHETAMINE SULFATE 2.5; 2.5; 2.5; 2.5 MG/1; MG/1; MG/1; MG/1
10 TABLET ORAL 2 TIMES DAILY
Qty: 60 TABLET | Refills: 0 | Status: SHIPPED | OUTPATIENT
Start: 2023-08-20 | End: 2023-10-03

## 2023-08-29 ENCOUNTER — MYC REFILL (OUTPATIENT)
Dept: FAMILY MEDICINE | Facility: CLINIC | Age: 33
End: 2023-08-29
Payer: COMMERCIAL

## 2023-08-29 DIAGNOSIS — R41.840 POOR CONCENTRATION: ICD-10-CM

## 2023-08-29 DIAGNOSIS — F90.0 ATTENTION DEFICIT HYPERACTIVITY DISORDER (ADHD), PREDOMINANTLY INATTENTIVE TYPE: ICD-10-CM

## 2023-08-29 RX ORDER — BUPROPION HYDROCHLORIDE 300 MG/1
300 TABLET ORAL EVERY MORNING
Qty: 90 TABLET | Refills: 1 | Status: SHIPPED | OUTPATIENT
Start: 2023-08-29 | End: 2024-03-19

## 2023-08-29 RX ORDER — BUPROPION HYDROCHLORIDE 150 MG/1
TABLET ORAL
Qty: 90 TABLET | Refills: 1 | Status: SHIPPED | OUTPATIENT
Start: 2023-08-29 | End: 2024-03-19

## 2023-08-29 RX ORDER — DEXTROAMPHETAMINE SACCHARATE, AMPHETAMINE ASPARTATE, DEXTROAMPHETAMINE SULFATE AND AMPHETAMINE SULFATE 2.5; 2.5; 2.5; 2.5 MG/1; MG/1; MG/1; MG/1
10 TABLET ORAL 2 TIMES DAILY
Qty: 60 TABLET | Refills: 0 | OUTPATIENT
Start: 2023-08-29

## 2023-10-03 ENCOUNTER — VIRTUAL VISIT (OUTPATIENT)
Dept: FAMILY MEDICINE | Facility: CLINIC | Age: 33
End: 2023-10-03
Payer: COMMERCIAL

## 2023-10-03 DIAGNOSIS — F41.1 GAD (GENERALIZED ANXIETY DISORDER): ICD-10-CM

## 2023-10-03 DIAGNOSIS — F33.1 MODERATE EPISODE OF RECURRENT MAJOR DEPRESSIVE DISORDER (H): ICD-10-CM

## 2023-10-03 DIAGNOSIS — F90.0 ATTENTION DEFICIT HYPERACTIVITY DISORDER (ADHD), PREDOMINANTLY INATTENTIVE TYPE: Primary | ICD-10-CM

## 2023-10-03 PROCEDURE — 99214 OFFICE O/P EST MOD 30 MIN: CPT | Mod: VID | Performed by: INTERNAL MEDICINE

## 2023-10-03 RX ORDER — DEXTROAMPHETAMINE SACCHARATE, AMPHETAMINE ASPARTATE, DEXTROAMPHETAMINE SULFATE AND AMPHETAMINE SULFATE 2.5; 2.5; 2.5; 2.5 MG/1; MG/1; MG/1; MG/1
10 TABLET ORAL DAILY
Qty: 30 TABLET | Refills: 0 | Status: SHIPPED | OUTPATIENT
Start: 2023-11-03 | End: 2023-11-03

## 2023-10-03 RX ORDER — DEXTROAMPHETAMINE SACCHARATE, AMPHETAMINE ASPARTATE, DEXTROAMPHETAMINE SULFATE AND AMPHETAMINE SULFATE 2.5; 2.5; 2.5; 2.5 MG/1; MG/1; MG/1; MG/1
10 TABLET ORAL DAILY
Qty: 30 TABLET | Refills: 0 | Status: SHIPPED | OUTPATIENT
Start: 2023-10-03 | End: 2023-11-02

## 2023-10-03 RX ORDER — DEXTROAMPHETAMINE SACCHARATE, AMPHETAMINE ASPARTATE, DEXTROAMPHETAMINE SULFATE AND AMPHETAMINE SULFATE 3.75; 3.75; 3.75; 3.75 MG/1; MG/1; MG/1; MG/1
15 TABLET ORAL DAILY
Qty: 30 TABLET | Refills: 0 | Status: SHIPPED | OUTPATIENT
Start: 2023-11-03 | End: 2023-12-03

## 2023-10-03 RX ORDER — DEXTROAMPHETAMINE SACCHARATE, AMPHETAMINE ASPARTATE, DEXTROAMPHETAMINE SULFATE AND AMPHETAMINE SULFATE 2.5; 2.5; 2.5; 2.5 MG/1; MG/1; MG/1; MG/1
10 TABLET ORAL DAILY
Qty: 30 TABLET | Refills: 0 | Status: SHIPPED | OUTPATIENT
Start: 2023-12-04 | End: 2024-01-03

## 2023-10-03 RX ORDER — DEXTROAMPHETAMINE SACCHARATE, AMPHETAMINE ASPARTATE, DEXTROAMPHETAMINE SULFATE AND AMPHETAMINE SULFATE 3.75; 3.75; 3.75; 3.75 MG/1; MG/1; MG/1; MG/1
15 TABLET ORAL DAILY
Qty: 30 TABLET | Refills: 0 | Status: SHIPPED | OUTPATIENT
Start: 2023-12-04 | End: 2024-01-03

## 2023-10-03 RX ORDER — DEXTROAMPHETAMINE SACCHARATE, AMPHETAMINE ASPARTATE, DEXTROAMPHETAMINE SULFATE AND AMPHETAMINE SULFATE 3.75; 3.75; 3.75; 3.75 MG/1; MG/1; MG/1; MG/1
15 TABLET ORAL DAILY
Qty: 30 TABLET | Refills: 0 | Status: SHIPPED | OUTPATIENT
Start: 2023-10-03 | End: 2023-11-09

## 2023-10-03 NOTE — PROGRESS NOTES
"Val is a 33 year old who is being evaluated via a billable video visit.      How would you like to obtain your AVS? MyChart  If the video visit is dropped, the invitation should be resent by: Text to cell phone: [phone]  Will anyone else be joining your video visit? No      Assessment & Plan     Attention deficit hyperactivity disorder (ADHD), predominantly inattentive type  - amphetamine-dextroamphetamine (ADDERALL) 10 MG tablet; Take 1 tablet (10 mg) by mouth daily for 30 days  - amphetamine-dextroamphetamine (ADDERALL) 10 MG tablet; Take 1 tablet (10 mg) by mouth daily for 30 days  - amphetamine-dextroamphetamine (ADDERALL) 10 MG tablet; Take 1 tablet (10 mg) by mouth daily for 30 days  - amphetamine-dextroamphetamine (ADDERALL) 15 MG tablet; Take 1 tablet (15 mg) by mouth daily for 30 days  - amphetamine-dextroamphetamine (ADDERALL) 15 MG tablet; Take 1 tablet (15 mg) by mouth daily for 30 days  - amphetamine-dextroamphetamine (ADDERALL) 15 MG tablet; Take 1 tablet (15 mg) by mouth daily for 30 days    GORGE (generalized anxiety disorder)  Stable. Continue medication.      Moderate episode of recurrent major depressive disorder (H)  Stable. Continue medication.       BMI:   Estimated body mass index is 27.37 kg/m  as calculated from the following:    Height as of 3/6/23: 1.727 m (5' 8\").    Weight as of 3/6/23: 81.6 kg (180 lb).       See Patient Instructions    Millicent Marrero MD  [facility]    Subjective   Val is a 33 year old, presenting for the following health issues:  Follow Up    Val is a 33 year old woman   Virtual visit for medication check    Patient has a signed Controlled Substance Agreement (non-opioid). Patient is aware of potential adverse effects of stimulants including cardiovascular compromise, palpitations, sleep disturbance, weight loss, worsening or other psychiatric conditions.     Return in 3-6 months for follow-up for continued management of ADHD and for " physical in office.       History of Present Illness       Reason for visit:  Required appt to renew my Adderall prescription    She eats 2-3 servings of fruits and vegetables daily.She consumes 1 sweetened beverage(s) daily.She exercises with enough effort to increase her heart rate 9 or less minutes per day.  She exercises with enough effort to increase her heart rate 3 or less days per week.   She is taking medications regularly.       Review of Systems       Objective       Vitals:  No vitals were obtained today due to virtual visit.    Physical Exam   GEN: No acute distress  RESP: No audible increased work of breathing. Patient speaking in full sentences without distress.  PSYCH: pleasant  Exam otherwise limited due to virtual platform        Video-Visit Details    Type of service:  Video Visit     Originating Location (pt. Location): Home  Distant Location (provider location):  On-site  Platform used for Video Visit: Becca

## 2023-10-03 NOTE — PROGRESS NOTES
COVID-19 PCR test completed. Patient handout For Patients Who Have Been Tested for Covid-19 (Coronavirus) was given to the patient, which includes test result notification process.     Benzoyl Peroxide Counseling: Patient counseled that medicine may cause skin irritation and bleach clothing.  In the event of skin irritation, the patient was advised to reduce the amount of the drug applied or use it less frequently.   The patient verbalized understanding of the proper use and possible adverse effects of benzoyl peroxide.  All of the patient's questions and concerns were addressed.

## 2023-11-03 ENCOUNTER — MYC REFILL (OUTPATIENT)
Dept: FAMILY MEDICINE | Facility: CLINIC | Age: 33
End: 2023-11-03
Payer: COMMERCIAL

## 2023-11-03 DIAGNOSIS — F90.0 ATTENTION DEFICIT HYPERACTIVITY DISORDER (ADHD), PREDOMINANTLY INATTENTIVE TYPE: ICD-10-CM

## 2023-11-03 RX ORDER — DEXTROAMPHETAMINE SACCHARATE, AMPHETAMINE ASPARTATE, DEXTROAMPHETAMINE SULFATE AND AMPHETAMINE SULFATE 2.5; 2.5; 2.5; 2.5 MG/1; MG/1; MG/1; MG/1
10 TABLET ORAL DAILY
Qty: 30 TABLET | Refills: 0 | Status: SHIPPED | OUTPATIENT
Start: 2023-11-03 | End: 2023-12-10

## 2023-11-09 ENCOUNTER — MYC REFILL (OUTPATIENT)
Dept: FAMILY MEDICINE | Facility: CLINIC | Age: 33
End: 2023-11-09
Payer: COMMERCIAL

## 2023-11-09 DIAGNOSIS — F90.0 ATTENTION DEFICIT HYPERACTIVITY DISORDER (ADHD), PREDOMINANTLY INATTENTIVE TYPE: ICD-10-CM

## 2023-11-09 RX ORDER — DEXTROAMPHETAMINE SACCHARATE, AMPHETAMINE ASPARTATE, DEXTROAMPHETAMINE SULFATE AND AMPHETAMINE SULFATE 3.75; 3.75; 3.75; 3.75 MG/1; MG/1; MG/1; MG/1
15 TABLET ORAL DAILY
Qty: 30 TABLET | Refills: 0 | Status: SHIPPED | OUTPATIENT
Start: 2023-11-09 | End: 2023-12-10

## 2023-12-29 ENCOUNTER — IMMUNIZATION (OUTPATIENT)
Dept: NURSING | Facility: CLINIC | Age: 33
End: 2023-12-29
Payer: COMMERCIAL

## 2023-12-29 PROCEDURE — 90480 ADMN SARSCOV2 VAC 1/ONLY CMP: CPT

## 2023-12-29 PROCEDURE — 90471 IMMUNIZATION ADMIN: CPT

## 2023-12-29 PROCEDURE — 90686 IIV4 VACC NO PRSV 0.5 ML IM: CPT

## 2023-12-29 PROCEDURE — 91320 SARSCV2 VAC 30MCG TRS-SUC IM: CPT

## 2024-01-08 ENCOUNTER — MYC REFILL (OUTPATIENT)
Dept: FAMILY MEDICINE | Facility: CLINIC | Age: 34
End: 2024-01-08
Payer: COMMERCIAL

## 2024-01-08 DIAGNOSIS — F90.0 ATTENTION DEFICIT HYPERACTIVITY DISORDER (ADHD), PREDOMINANTLY INATTENTIVE TYPE: ICD-10-CM

## 2024-01-08 RX ORDER — DEXTROAMPHETAMINE SACCHARATE, AMPHETAMINE ASPARTATE, DEXTROAMPHETAMINE SULFATE AND AMPHETAMINE SULFATE 3.75; 3.75; 3.75; 3.75 MG/1; MG/1; MG/1; MG/1
15 TABLET ORAL DAILY
Qty: 30 TABLET | Refills: 0 | Status: SHIPPED | OUTPATIENT
Start: 2024-01-08 | End: 2024-02-07

## 2024-01-08 RX ORDER — DEXTROAMPHETAMINE SACCHARATE, AMPHETAMINE ASPARTATE, DEXTROAMPHETAMINE SULFATE AND AMPHETAMINE SULFATE 2.5; 2.5; 2.5; 2.5 MG/1; MG/1; MG/1; MG/1
10 TABLET ORAL DAILY
Qty: 30 TABLET | Refills: 0 | Status: SHIPPED | OUTPATIENT
Start: 2024-01-08 | End: 2024-02-07

## 2024-02-07 ENCOUNTER — MYC REFILL (OUTPATIENT)
Dept: FAMILY MEDICINE | Facility: CLINIC | Age: 34
End: 2024-02-07
Payer: COMMERCIAL

## 2024-02-07 DIAGNOSIS — F90.0 ATTENTION DEFICIT HYPERACTIVITY DISORDER (ADHD), PREDOMINANTLY INATTENTIVE TYPE: ICD-10-CM

## 2024-02-08 RX ORDER — DEXTROAMPHETAMINE SACCHARATE, AMPHETAMINE ASPARTATE, DEXTROAMPHETAMINE SULFATE AND AMPHETAMINE SULFATE 2.5; 2.5; 2.5; 2.5 MG/1; MG/1; MG/1; MG/1
10 TABLET ORAL DAILY
Qty: 30 TABLET | Refills: 0 | Status: SHIPPED | OUTPATIENT
Start: 2024-02-08 | End: 2024-03-19

## 2024-02-08 RX ORDER — DEXTROAMPHETAMINE SACCHARATE, AMPHETAMINE ASPARTATE, DEXTROAMPHETAMINE SULFATE AND AMPHETAMINE SULFATE 3.75; 3.75; 3.75; 3.75 MG/1; MG/1; MG/1; MG/1
15 TABLET ORAL DAILY
Qty: 30 TABLET | Refills: 0 | Status: SHIPPED | OUTPATIENT
Start: 2024-02-08 | End: 2024-03-19

## 2024-03-17 DIAGNOSIS — F41.9 ANXIETY: ICD-10-CM

## 2024-03-18 RX ORDER — ESCITALOPRAM OXALATE 20 MG/1
20 TABLET ORAL DAILY
Qty: 90 TABLET | Refills: 0 | Status: SHIPPED | OUTPATIENT
Start: 2024-03-18 | End: 2024-06-24

## 2024-03-19 ENCOUNTER — MYC REFILL (OUTPATIENT)
Dept: FAMILY MEDICINE | Facility: CLINIC | Age: 34
End: 2024-03-19
Payer: COMMERCIAL

## 2024-03-19 DIAGNOSIS — R41.840 POOR CONCENTRATION: ICD-10-CM

## 2024-03-19 DIAGNOSIS — F90.0 ATTENTION DEFICIT HYPERACTIVITY DISORDER (ADHD), PREDOMINANTLY INATTENTIVE TYPE: ICD-10-CM

## 2024-03-19 DIAGNOSIS — G47.00 INSOMNIA, UNSPECIFIED TYPE: ICD-10-CM

## 2024-03-20 RX ORDER — BUPROPION HYDROCHLORIDE 150 MG/1
TABLET ORAL
Qty: 90 TABLET | Refills: 0 | Status: SHIPPED | OUTPATIENT
Start: 2024-03-20 | End: 2024-06-24

## 2024-03-20 RX ORDER — DEXTROAMPHETAMINE SACCHARATE, AMPHETAMINE ASPARTATE, DEXTROAMPHETAMINE SULFATE AND AMPHETAMINE SULFATE 3.75; 3.75; 3.75; 3.75 MG/1; MG/1; MG/1; MG/1
15 TABLET ORAL DAILY
Qty: 30 TABLET | Refills: 0 | Status: SHIPPED | OUTPATIENT
Start: 2024-03-20 | End: 2024-04-04

## 2024-03-20 RX ORDER — BUPROPION HYDROCHLORIDE 300 MG/1
300 TABLET ORAL EVERY MORNING
Qty: 90 TABLET | Refills: 0 | Status: SHIPPED | OUTPATIENT
Start: 2024-03-20 | End: 2024-06-24

## 2024-03-20 RX ORDER — TRAZODONE HYDROCHLORIDE 50 MG/1
50 TABLET, FILM COATED ORAL AT BEDTIME
Qty: 90 TABLET | Refills: 0 | Status: SHIPPED | OUTPATIENT
Start: 2024-03-20 | End: 2024-06-24

## 2024-03-20 RX ORDER — DEXTROAMPHETAMINE SACCHARATE, AMPHETAMINE ASPARTATE, DEXTROAMPHETAMINE SULFATE AND AMPHETAMINE SULFATE 2.5; 2.5; 2.5; 2.5 MG/1; MG/1; MG/1; MG/1
10 TABLET ORAL DAILY
Qty: 30 TABLET | Refills: 0 | Status: SHIPPED | OUTPATIENT
Start: 2024-03-20 | End: 2024-04-04

## 2024-04-04 ENCOUNTER — OFFICE VISIT (OUTPATIENT)
Dept: FAMILY MEDICINE | Facility: CLINIC | Age: 34
End: 2024-04-04
Payer: COMMERCIAL

## 2024-04-04 VITALS
SYSTOLIC BLOOD PRESSURE: 118 MMHG | BODY MASS INDEX: 27.37 KG/M2 | HEIGHT: 68 IN | TEMPERATURE: 97.3 F | HEART RATE: 95 BPM | RESPIRATION RATE: 20 BRPM | DIASTOLIC BLOOD PRESSURE: 88 MMHG | OXYGEN SATURATION: 97 %

## 2024-04-04 DIAGNOSIS — Z00.00 ANNUAL PHYSICAL EXAM: Primary | ICD-10-CM

## 2024-04-04 DIAGNOSIS — N63.0 MASS OF BREAST, UNSPECIFIED LATERALITY: ICD-10-CM

## 2024-04-04 DIAGNOSIS — F33.1 MODERATE EPISODE OF RECURRENT MAJOR DEPRESSIVE DISORDER (H): ICD-10-CM

## 2024-04-04 DIAGNOSIS — F41.1 GAD (GENERALIZED ANXIETY DISORDER): ICD-10-CM

## 2024-04-04 DIAGNOSIS — F90.0 ATTENTION DEFICIT HYPERACTIVITY DISORDER (ADHD), PREDOMINANTLY INATTENTIVE TYPE: ICD-10-CM

## 2024-04-04 DIAGNOSIS — Z23 NEED FOR VACCINATION: ICD-10-CM

## 2024-04-04 LAB
AMPHETAMINES UR QL SCN: ABNORMAL
BARBITURATES UR QL SCN: ABNORMAL
BENZODIAZ UR QL SCN: ABNORMAL
BZE UR QL SCN: ABNORMAL
CANNABINOIDS UR QL SCN: ABNORMAL
ERYTHROCYTE [DISTWIDTH] IN BLOOD BY AUTOMATED COUNT: 11.8 % (ref 10–15)
FENTANYL UR QL: ABNORMAL
HCT VFR BLD AUTO: 39.3 % (ref 35–47)
HGB BLD-MCNC: 13 G/DL (ref 11.7–15.7)
MCH RBC QN AUTO: 30 PG (ref 26.5–33)
MCHC RBC AUTO-ENTMCNC: 33.1 G/DL (ref 31.5–36.5)
MCV RBC AUTO: 91 FL (ref 78–100)
OPIATES UR QL SCN: ABNORMAL
PCP QUAL URINE (ROCHE): ABNORMAL
PLATELET # BLD AUTO: 280 10E3/UL (ref 150–450)
RBC # BLD AUTO: 4.33 10E6/UL (ref 3.8–5.2)
WBC # BLD AUTO: 7.1 10E3/UL (ref 4–11)

## 2024-04-04 PROCEDURE — 80048 BASIC METABOLIC PNL TOTAL CA: CPT | Performed by: INTERNAL MEDICINE

## 2024-04-04 PROCEDURE — 80061 LIPID PANEL: CPT | Performed by: INTERNAL MEDICINE

## 2024-04-04 PROCEDURE — 90715 TDAP VACCINE 7 YRS/> IM: CPT | Performed by: INTERNAL MEDICINE

## 2024-04-04 PROCEDURE — 36415 COLL VENOUS BLD VENIPUNCTURE: CPT | Performed by: INTERNAL MEDICINE

## 2024-04-04 PROCEDURE — 80307 DRUG TEST PRSMV CHEM ANLYZR: CPT | Performed by: INTERNAL MEDICINE

## 2024-04-04 PROCEDURE — 90471 IMMUNIZATION ADMIN: CPT | Performed by: INTERNAL MEDICINE

## 2024-04-04 PROCEDURE — 99395 PREV VISIT EST AGE 18-39: CPT | Mod: 25 | Performed by: INTERNAL MEDICINE

## 2024-04-04 PROCEDURE — 99214 OFFICE O/P EST MOD 30 MIN: CPT | Mod: 25 | Performed by: INTERNAL MEDICINE

## 2024-04-04 PROCEDURE — 85027 COMPLETE CBC AUTOMATED: CPT | Performed by: INTERNAL MEDICINE

## 2024-04-04 RX ORDER — DEXTROAMPHETAMINE SACCHARATE, AMPHETAMINE ASPARTATE MONOHYDRATE, DEXTROAMPHETAMINE SULFATE AND AMPHETAMINE SULFATE 2.5; 2.5; 2.5; 2.5 MG/1; MG/1; MG/1; MG/1
10 CAPSULE, EXTENDED RELEASE ORAL DAILY
Qty: 30 CAPSULE | Refills: 0 | Status: CANCELLED | OUTPATIENT
Start: 2024-06-05 | End: 2024-07-05

## 2024-04-04 RX ORDER — DEXTROAMPHETAMINE SACCHARATE, AMPHETAMINE ASPARTATE MONOHYDRATE, DEXTROAMPHETAMINE SULFATE AND AMPHETAMINE SULFATE 5; 5; 5; 5 MG/1; MG/1; MG/1; MG/1
20 CAPSULE, EXTENDED RELEASE ORAL DAILY
Qty: 30 CAPSULE | Refills: 0 | Status: SHIPPED | OUTPATIENT
Start: 2024-04-04 | End: 2024-05-04

## 2024-04-04 RX ORDER — DEXTROAMPHETAMINE SACCHARATE, AMPHETAMINE ASPARTATE MONOHYDRATE, DEXTROAMPHETAMINE SULFATE AND AMPHETAMINE SULFATE 5; 5; 5; 5 MG/1; MG/1; MG/1; MG/1
20 CAPSULE, EXTENDED RELEASE ORAL DAILY
Qty: 30 CAPSULE | Refills: 0 | Status: SHIPPED | OUTPATIENT
Start: 2024-06-05 | End: 2024-07-05

## 2024-04-04 RX ORDER — DEXTROAMPHETAMINE SACCHARATE, AMPHETAMINE ASPARTATE MONOHYDRATE, DEXTROAMPHETAMINE SULFATE AND AMPHETAMINE SULFATE 2.5; 2.5; 2.5; 2.5 MG/1; MG/1; MG/1; MG/1
10 CAPSULE, EXTENDED RELEASE ORAL DAILY
Qty: 30 CAPSULE | Refills: 0 | Status: CANCELLED | OUTPATIENT
Start: 2024-05-05 | End: 2024-06-04

## 2024-04-04 RX ORDER — DEXTROAMPHETAMINE SACCHARATE, AMPHETAMINE ASPARTATE MONOHYDRATE, DEXTROAMPHETAMINE SULFATE AND AMPHETAMINE SULFATE 5; 5; 5; 5 MG/1; MG/1; MG/1; MG/1
20 CAPSULE, EXTENDED RELEASE ORAL DAILY
Qty: 30 CAPSULE | Refills: 0 | Status: SHIPPED | OUTPATIENT
Start: 2024-05-05 | End: 2024-05-28

## 2024-04-04 RX ORDER — DEXTROAMPHETAMINE SACCHARATE, AMPHETAMINE ASPARTATE MONOHYDRATE, DEXTROAMPHETAMINE SULFATE AND AMPHETAMINE SULFATE 2.5; 2.5; 2.5; 2.5 MG/1; MG/1; MG/1; MG/1
10 CAPSULE, EXTENDED RELEASE ORAL DAILY
Qty: 30 CAPSULE | Refills: 0 | Status: CANCELLED | OUTPATIENT
Start: 2024-04-04 | End: 2024-05-04

## 2024-04-04 SDOH — HEALTH STABILITY: PHYSICAL HEALTH: ON AVERAGE, HOW MANY DAYS PER WEEK DO YOU ENGAGE IN MODERATE TO STRENUOUS EXERCISE (LIKE A BRISK WALK)?: 3 DAYS

## 2024-04-04 SDOH — HEALTH STABILITY: PHYSICAL HEALTH: ON AVERAGE, HOW MANY MINUTES DO YOU ENGAGE IN EXERCISE AT THIS LEVEL?: 20 MIN

## 2024-04-04 ASSESSMENT — PAIN SCALES - GENERAL: PAINLEVEL: NO PAIN (0)

## 2024-04-04 ASSESSMENT — SOCIAL DETERMINANTS OF HEALTH (SDOH): HOW OFTEN DO YOU GET TOGETHER WITH FRIENDS OR RELATIVES?: ONCE A WEEK

## 2024-04-04 ASSESSMENT — PATIENT HEALTH QUESTIONNAIRE - PHQ9
10. IF YOU CHECKED OFF ANY PROBLEMS, HOW DIFFICULT HAVE THESE PROBLEMS MADE IT FOR YOU TO DO YOUR WORK, TAKE CARE OF THINGS AT HOME, OR GET ALONG WITH OTHER PEOPLE: SOMEWHAT DIFFICULT
SUM OF ALL RESPONSES TO PHQ QUESTIONS 1-9: 9
SUM OF ALL RESPONSES TO PHQ QUESTIONS 1-9: 9

## 2024-04-04 NOTE — LETTER
[facility]  04/04/24  Patient: Val Gallagher  YOB: 1990  Medical Record Number: [id number]                                                                                  Non-Opioid Controlled Substance Agreement    This is an agreement between you and your provider regarding safe and appropriate use of controlled substances prescribed by your care team. Controlled substances are?medicines that can cause physical and mental dependence (abuse).     There are strict laws about having and using these medicines. We here at [facility] are  committed to working with you in your efforts to get better. To support you in this work, we'll help you schedule regular office appointments for medicine refills. If we must cancel or change your appointment for any reason, we'll make sure you have enough medicine to last until your next appointment.     As a Provider, I will:   Listen carefully to your concerns while treating you with respect.   Recommend a treatment plan that I believe is in your best interest and may involve therapies other than medicine.    Talk with you often about the possible benefits and the risk of harm of any medicine that we prescribe for you.  Assess the safety of this medicine and check how well it works.    Provide a plan on how to taper (discontinue or go off) using this medicine if the decision is made to stop its use.      ::  As a Patient, I understand controlled substances:     Are prescribed by my care provider to help me function or work and manage my condition(s).?  Are strong medicines and can cause serious side effects.     Need to be taken exactly as prescribed.?Combining controlled substances with certain medicines or chemicals (such as illegal drugs, alcohol, sedatives, sleeping pills, and benzodiazepines) can be dangerous or even fatal.? If I stop taking my medicines suddenly, I may have severe withdrawal symptoms.     The risks, benefits, and side  effects of these medicine(s) were explained to me. I agree that:    I will take part in other treatments as advised by my care team. This may be psychiatry or counseling, physical therapy, behavioral therapy, group treatment or a referral to specialist.    I will keep all my appointments and understand this is part of the monitoring of controlled substances.?My care team may require an office visit for EVERY controlled substance refill. If I miss appointments or don t follow instructions, my care team may stop my medicine    I will take my medicines as prescribed. I will not change the dose or schedule unless my care team tells me to. There will be no refills if I run out early.      I may be asked to come to the clinic and complete a urine drug test or complete a pill count. If I don t give a urine sample or participate in a pill count, the care team may stop my medicine.    I will only receive controlled substance prescriptions from this clinic. If I am treated by another provider, I will tell them that I am taking controlled substances and that I have a treatment agreement with this provider. I will inform my [facility] care team within one business day if I am given a prescription for any controlled substance by another healthcare provider. My [facility] care team can contact other providers and pharmacists about my use of any medicines.    It is up to me to make sure that I don't run out of my medicines on weekends or holidays.?If my care team is willing to refill my prescription without a visit, I must request refills only during office hours. Refills may take up to 3 business days to process. I will use one pharmacy to fill all my controlled substance prescriptions. I will notify the clinic about any changes to my insurance or medicine availability.    I am responsible for my prescriptions. If the medicine/prescription is lost, stolen or destroyed, it will not be replaced.?I also agree not to  share controlled substance medicines with anyone.     I am aware I should not use any illegal or recreational drugs. I agree not to drink alcohol unless my care team says I can.     If I enroll in the [place] Medical Cannabis program, I will tell my care team before my next refill.    I will tell my care team right away if I become pregnant, have a new medical problem treated outside of my regular clinic, or have a change in my medicines.     I understand that this medicine can affect my thinking, judgment and reaction time.? Alcohol and drugs affect the brain and body, which can affect the safety of my driving. Being under the influence of alcohol or drugs can affect my decision-making, behaviors, personal safety and the safety of others. Driving while impaired (DWI) can occur if a person is driving, operating or in physical control of a car, motorcycle, boat, snowmobile, ATV, motorbike, off-road vehicle or any other motor vehicle ([place] Statute 169A.20). I understand the risk if I choose to drive or operate any vehicle or machinery.    I understand that if I do not follow any of the conditions above, my prescriptions or treatment may be stopped or changed.   I agree that my provider, clinic care team and pharmacy may work with any city, state or federal law enforcement agency that investigates the misuse, sale or other diversion of my controlled medicine. I will allow my provider to discuss my care with, or share a copy of, this agreement with any other treating provider, pharmacy or emergency room where I receive care.     I have read this agreement and have asked questions about anything I did not understand.    ________________________________________________________  Patient Signature - Val Gallagher     ___________________                   Date     ________________________________________________________  Provider Signature - Millicent Marrero MD       ___________________                   Date      ________________________________________________________  Witness Signature (required if provider not present while patient signing)          ___________________                   Date

## 2024-04-04 NOTE — PATIENT INSTRUCTIONS
You are due for mammogram.  Please call the following number to make appointment :  [phone]  It is located in [address]

## 2024-04-04 NOTE — PROGRESS NOTES
Preventive Care Visit  [facility] TETE Marrero MD, Internal Medicine  Apr 4, 2024      Assessment & Plan     Annual physical exam  - CBC with Platelets (Today)  - Basic metabolic panel  - Lipid Profile    GORGE (generalized anxiety disorder)  Moderate episode of recurrent major depressive disorder (H)  Continue wellbutrin and selective serotonin reuptake inhibitor. She will be following up with a psychiatrist as both meds help with depression but make her anxiety worse.     Attention deficit hyperactivity disorder (ADHD), predominantly inattentive type  Changed dose to XR once daily dosing.   CSA updated  - Urine Drug Screen; Future  - amphetamine-dextroamphetamine (ADDERALL XR) 20 MG 24 hr capsule; Take 1 capsule (20 mg) by mouth daily for 30 days  - amphetamine-dextroamphetamine (ADDERALL XR) 20 MG 24 hr capsule; Take 1 capsule (20 mg) by mouth daily for 30 days  - amphetamine-dextroamphetamine (ADDERALL XR) 20 MG 24 hr capsule; Take 1 capsule (20 mg) by mouth daily for 30 days  - Urine Drug Screen          Mass of breast, unspecified laterality  - MA Diagnostic Digital Bilateral; Future    Need for vaccination  tdap      Counseling  Appropriate preventive services were discussed with this patient, including applicable screening as appropriate for fall prevention, nutrition, physical activity, Tobacco-use cessation, weight loss and cognition.  Checklist reviewing preventive services available has been given to the patient.  Reviewed patient's diet, addressing concerns and/or questions.   She is at risk for lack of exercise and has been provided with information to increase physical activity for the benefit of her well-being.   The patient was instructed to see the dentist every 6 months.   The patient's PHQ-9 score is consistent with mild depression. She was provided with information regarding depression.       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  She is now working part time.   She stays up for 36 hours and then sleeps for 14 hours.   She is going to see a psychiatrist for anxiety, MDD and ADD.   Adderall: dose changed         4/4/2024     1:28 PM   Additional Questions   Roomed by Jose BALDERAS MA   Accompanied by NA        Health Care Directive  Patient does not have a Health Care Directive or Living Will: Discussed advance care planning with patient; information given to patient to review.    HPIAnswers submitted by the patient for this visit:  Patient Health Questionnaire (Submitted on 4/4/2024)  If you checked off any problems, how difficult have these problems made it for you to do your work, take care of things at home, or get along with other people?: Somewhat difficult  PHQ9 TOTAL SCORE: 9          4/4/2024   General Health   How would you rate your overall physical health? (!) FAIR   Feel stress (tense, anxious, or unable to sleep) Rather much   (!) STRESS CONCERN      4/4/2024   Nutrition   Three or more servings of calcium each day? Yes   Diet: Regular (no restrictions)   How many servings of fruit and vegetables per day? (!) 2-3   How many sweetened beverages each day? 0-1         4/4/2024   Exercise   Days per week of moderate/strenous exercise 3 days   Average minutes spent exercising at this level 20 min         4/4/2024   Social Factors   Frequency of gathering with friends or relatives Once a week   Worry food won't last until get money to buy more No   Food not last or not have enough money for food? No   Do you have housing?  Yes   Are you worried about losing your housing? No   Lack of transportation? No   Unable to get utilities (heat,electricity)? No         4/4/2024   Dental   Dentist two times every year? (!) NO          2024   TB Screening   Were you born outside of the [place]? No       Today's PHQ-9 Score:       2024    12:41 PM   PHQ-9 SCORE   PHQ-9 Total Score MyChart 9 (Mild depression)   PHQ-9 Total Score 9         2024   Substance Use   Alcohol more than 3/day or more than 7/wk No   Do you use any other substances recreationally? No     Social History     Tobacco Use    Smoking status: Former     Packs/day: 0.00     Years: 5.00     Additional pack years: 0.00     Total pack years: 0.00     Types: Cigarettes, Hookah     Start date: 2011     Quit date: 2016     Years since quittin.6    Smokeless tobacco: Never    Tobacco comments:     I smoked about a pack a month.   Vaping Use    Vaping Use: Never used   Substance Use Topics    Alcohol use: Yes     Comment: Alcohol use 1-2x per week    Drug use: No                                  3/6/2023   LAST FHS-7 RESULTS   1st degree relative breast or ovarian cancer No   Any relative bilateral breast cancer Unknown   Any male have breast cancer No   Any ONE woman have BOTH breast AND ovarian cancer Yes   Any woman with breast cancer before 50yrs No   2 or more relatives with breast AND/OR ovarian cancer Yes   2 or more relatives with breast AND/OR bowel cancer No              Mammogram Screening - Patient under 40 years of age: Routine Mammogram Screening not recommended.         2024   STI Screening   New sexual partner(s) since last STI/HIV test? No     History of abnormal Pap smear: NO - age 30-65 PAP every 5 years with negative HPV co-testing recommended        Latest Ref Rng & Units 3/6/2023     1:16 PM 2020     9:09 AM 2017     8:31 AM   PAP / HPV   PAP  Negative for Intraepithelial Lesion or Malignancy (NILM)      PAP (Historical)   NIL  NIL    HPV 16 DNA Negative Negative      HPV 18 DNA Negative Negative      Other HR HPV Negative Negative              2024   Contraception/Family Planning   Questions about contraception or family planning No  "    Reviewed and updated as needed this visit by Provider                     Objective    Exam  /88 (BP Location: Right arm, Patient Position: Sitting, Cuff Size: Adult Large)   Pulse 95   Temp 97.3  F (36.3  C) (Temporal)   Resp 20   Ht 1.727 m (5' 8\")   LMP 03/13/2024 (Exact Date)   SpO2 97%   BMI 27.37 kg/m     Estimated body mass index is 27.37 kg/m  as calculated from the following:    Height as of this encounter: 1.727 m (5' 8\").    Weight as of 3/6/23: 81.6 kg (180 lb).    Physical Exam  Vitals reviewed.   Constitutional:       Appearance: Normal appearance.   HENT:      Right Ear: Tympanic membrane normal. There is no impacted cerumen.      Left Ear: Tympanic membrane normal. There is no impacted cerumen.      Mouth/Throat:      Mouth: Mucous membranes are moist.      Pharynx: Oropharynx is clear. No oropharyngeal exudate or posterior oropharyngeal erythema.   Cardiovascular:      Rate and Rhythm: Normal rate and regular rhythm.      Heart sounds: Normal heart sounds. No murmur heard.     No gallop.   Pulmonary:      Effort: Pulmonary effort is normal. No respiratory distress.      Breath sounds: Normal breath sounds. No stridor. No wheezing, rhonchi or rales.   Chest:   Breasts:     Right: No swelling, bleeding, inverted nipple, mass, nipple discharge, skin change or tenderness.      Left: No swelling, bleeding, inverted nipple, mass, nipple discharge, skin change or tenderness.   Abdominal:      General: Abdomen is flat. There is no distension.      Palpations: Abdomen is soft. There is no mass.      Tenderness: There is no abdominal tenderness. There is no guarding.      Hernia: No hernia is present.   Musculoskeletal:         General: Normal range of motion.      Cervical back: Normal range of motion and neck supple. No rigidity or tenderness.      Right lower leg: No edema.      Left lower leg: No edema.   Lymphadenopathy:      Cervical: No cervical adenopathy.   Skin:     General: Skin is " warm and dry.   Neurological:      General: No focal deficit present.      Mental Status: She is alert.

## 2024-04-04 NOTE — NURSING NOTE
Prior to immunization administration, verified patients identity using patient s name and date of birth. Please see Immunization Activity for additional information.     Screening Questionnaire for Adult Immunization    Are you sick today?   No   Do you have allergies to medications, food, a vaccine component or latex?   No   Have you ever had a serious reaction after receiving a vaccination?   No   Do you have a long-term health problem with heart, lung, kidney, or metabolic disease (e.g., diabetes), asthma, a blood disorder, no spleen, complement component deficiency, a cochlear implant, or a spinal fluid leak?  Are you on long-term aspirin therapy?   No   Do you have cancer, leukemia, HIV/AIDS, or any other immune system problem?   No   Do you have a parent, brother, or sister with an immune system problem?   No   In the past 3 months, have you taken medications that affect  your immune system, such as prednisone, other steroids, or anticancer drugs; drugs for the treatment of rheumatoid arthritis, Crohn s disease, or psoriasis; or have you had radiation treatments?   No   Have you had a seizure, or a brain or other nervous system problem?   No   During the past year, have you received a transfusion of blood or blood    products, or been given immune (gamma) globulin or antiviral drug?   No   For women: Are you pregnant or is there a chance you could become       pregnant during the next month?   No   Have you received any vaccinations in the past 4 weeks?   No     Immunization questionnaire answers were all negative.    Patient is receiving Tdap.      Patient instructed to remain in clinic for 15 minutes afterwards, and to report any adverse reactions.     Screening performed by Jose Shine MA on 4/4/2024 at 2:17 PM.

## 2024-04-05 PROBLEM — Z23 NEED FOR VACCINATION: Status: ACTIVE | Noted: 2024-04-05

## 2024-04-05 PROBLEM — N63.0 MASS OF BREAST, UNSPECIFIED LATERALITY: Status: ACTIVE | Noted: 2024-04-05

## 2024-04-05 LAB
ANION GAP SERPL CALCULATED.3IONS-SCNC: 11 MMOL/L (ref 7–15)
BUN SERPL-MCNC: 11.5 MG/DL (ref 6–20)
CALCIUM SERPL-MCNC: 9.8 MG/DL (ref 8.6–10)
CHLORIDE SERPL-SCNC: 100 MMOL/L (ref 98–107)
CHOLEST SERPL-MCNC: 174 MG/DL
CREAT SERPL-MCNC: 0.86 MG/DL (ref 0.51–0.95)
DEPRECATED HCO3 PLAS-SCNC: 25 MMOL/L (ref 22–29)
EGFRCR SERPLBLD CKD-EPI 2021: >90 ML/MIN/1.73M2
FASTING STATUS PATIENT QL REPORTED: YES
GLUCOSE SERPL-MCNC: 86 MG/DL (ref 70–99)
HDLC SERPL-MCNC: 69 MG/DL
LDLC SERPL CALC-MCNC: 96 MG/DL
NONHDLC SERPL-MCNC: 105 MG/DL
POTASSIUM SERPL-SCNC: 4.1 MMOL/L (ref 3.4–5.3)
SODIUM SERPL-SCNC: 136 MMOL/L (ref 135–145)
TRIGL SERPL-MCNC: 46 MG/DL

## 2024-05-28 ENCOUNTER — MYC REFILL (OUTPATIENT)
Dept: FAMILY MEDICINE | Facility: CLINIC | Age: 34
End: 2024-05-28
Payer: COMMERCIAL

## 2024-05-28 DIAGNOSIS — F90.0 ATTENTION DEFICIT HYPERACTIVITY DISORDER (ADHD), PREDOMINANTLY INATTENTIVE TYPE: ICD-10-CM

## 2024-05-30 RX ORDER — DEXTROAMPHETAMINE SACCHARATE, AMPHETAMINE ASPARTATE MONOHYDRATE, DEXTROAMPHETAMINE SULFATE AND AMPHETAMINE SULFATE 5; 5; 5; 5 MG/1; MG/1; MG/1; MG/1
20 CAPSULE, EXTENDED RELEASE ORAL DAILY
Qty: 30 CAPSULE | Refills: 0 | Status: SHIPPED | OUTPATIENT
Start: 2024-05-30 | End: 2024-06-28

## 2024-06-23 DIAGNOSIS — F41.9 ANXIETY: ICD-10-CM

## 2024-06-23 DIAGNOSIS — G47.00 INSOMNIA, UNSPECIFIED TYPE: ICD-10-CM

## 2024-06-23 DIAGNOSIS — R41.840 POOR CONCENTRATION: ICD-10-CM

## 2024-06-24 RX ORDER — ESCITALOPRAM OXALATE 20 MG/1
20 TABLET ORAL DAILY
Qty: 90 TABLET | Refills: 0 | Status: SHIPPED | OUTPATIENT
Start: 2024-06-24 | End: 2024-09-17

## 2024-06-24 RX ORDER — BUPROPION HYDROCHLORIDE 300 MG/1
300 TABLET ORAL EVERY MORNING
Qty: 90 TABLET | Refills: 0 | Status: SHIPPED | OUTPATIENT
Start: 2024-06-24 | End: 2024-09-17

## 2024-06-24 RX ORDER — TRAZODONE HYDROCHLORIDE 50 MG/1
50 TABLET, FILM COATED ORAL AT BEDTIME
Qty: 90 TABLET | Refills: 0 | Status: SHIPPED | OUTPATIENT
Start: 2024-06-24 | End: 2024-09-17

## 2024-06-24 RX ORDER — BUPROPION HYDROCHLORIDE 150 MG/1
TABLET ORAL
Qty: 90 TABLET | Refills: 0 | Status: SHIPPED | OUTPATIENT
Start: 2024-06-24 | End: 2024-09-17

## 2024-06-28 ENCOUNTER — MYC REFILL (OUTPATIENT)
Dept: FAMILY MEDICINE | Facility: CLINIC | Age: 34
End: 2024-06-28
Payer: COMMERCIAL

## 2024-06-28 DIAGNOSIS — F90.0 ATTENTION DEFICIT HYPERACTIVITY DISORDER (ADHD), PREDOMINANTLY INATTENTIVE TYPE: ICD-10-CM

## 2024-07-01 ENCOUNTER — TELEPHONE (OUTPATIENT)
Dept: FAMILY MEDICINE | Facility: CLINIC | Age: 34
End: 2024-07-01
Payer: COMMERCIAL

## 2024-07-01 RX ORDER — DEXTROAMPHETAMINE SACCHARATE, AMPHETAMINE ASPARTATE MONOHYDRATE, DEXTROAMPHETAMINE SULFATE AND AMPHETAMINE SULFATE 5; 5; 5; 5 MG/1; MG/1; MG/1; MG/1
20 CAPSULE, EXTENDED RELEASE ORAL DAILY
Qty: 30 CAPSULE | Refills: 0 | Status: SHIPPED | OUTPATIENT
Start: 2024-07-01 | End: 2024-09-17

## 2024-07-01 NOTE — TELEPHONE ENCOUNTER
Prior Authorization Retail Medication Request    Medication/Dose: Can you obtain a prior authorization for amphetamine salts er 20 mg capsules?  Thank you!  Diagnosis and ICD code (if different than what is on RX):    New/renewal/insurance change PA/secondary ins. PA:  Previously Tried and Failed:    Rationale:      Insurance   Primary: [employer]   Insurance ID:  [id number]    Secondary (if applicable):  Insurance ID:      Pharmacy Information (if different than what is on RX)  Name:  [facility]  Phone:  [phone]  Fax:[phone]

## 2024-07-12 NOTE — TELEPHONE ENCOUNTER
Retail Pharmacy Prior Authorization Team   Phone: [phone]    PA Initiation    Medication: AMPHETAMINE-DEXTROAMPHET ER 20 MG PO CP24  Insurance Company:  [employer] via IP [employer] @[phone]  Pharmacy Filling the Rx: [facility] - [address] [facility]  Filling Pharmacy Phone: [phone]  Filling Pharmacy Fax:    Start Date: 7/12/2024  Initiated over the phone via KARLOS Christopher Case# [id number]

## 2024-07-16 NOTE — TELEPHONE ENCOUNTER
Retail Pharmacy Prior Authorization Team   Phone: [phone]    Additional question set requested and faxed back:          Note: Due to record-high volumes, our turn-around time is taking longer than usual . We are currently 6 days behind in the pools.   We are working diligently to submit all requests in a timely manner and in the order they are received. Please only flag TRUE URGENT requests as high priority to the pool at this time.   If you have questions on status of PA's,  please send a note/message in the active PA encounter and send back to the [facility] PA pool [[id number]].    If you have questions about the turn-around time or about our process, please reach out to our supervisor Marsha Jiménez.   Thank you!   RPPA (Retail Pharmacy Prior Authorization) team

## 2024-07-17 NOTE — TELEPHONE ENCOUNTER
Retail Pharmacy Prior Authorization Team   Phone: [phone]    I called INS to confirm receipt of fax, PA rep Christopher told me coverage has  2024.... I am unsure why this information wasn't given to us when it was first initiated over the phone. Corrina silly. But pharmacy is aware and has filled the RX through a discount card for the patient. If a new PA is needed in the future they will send a fresh request. No further action required. I am closing this encounter.     Thank you,  Val Bean, [facility]  [facility] PA Team

## 2024-09-17 ENCOUNTER — MYC REFILL (OUTPATIENT)
Dept: FAMILY MEDICINE | Facility: CLINIC | Age: 34
End: 2024-09-17
Payer: COMMERCIAL

## 2024-09-17 DIAGNOSIS — R41.840 POOR CONCENTRATION: ICD-10-CM

## 2024-09-17 DIAGNOSIS — F90.0 ATTENTION DEFICIT HYPERACTIVITY DISORDER (ADHD), PREDOMINANTLY INATTENTIVE TYPE: ICD-10-CM

## 2024-09-17 DIAGNOSIS — G47.00 INSOMNIA, UNSPECIFIED TYPE: ICD-10-CM

## 2024-09-17 DIAGNOSIS — F41.9 ANXIETY: ICD-10-CM

## 2024-09-20 RX ORDER — ESCITALOPRAM OXALATE 20 MG/1
20 TABLET ORAL DAILY
Qty: 90 TABLET | Refills: 1 | Status: SHIPPED | OUTPATIENT
Start: 2024-09-20

## 2024-09-20 RX ORDER — TRAZODONE HYDROCHLORIDE 50 MG/1
50 TABLET, FILM COATED ORAL AT BEDTIME
Qty: 90 TABLET | Refills: 1 | Status: SHIPPED | OUTPATIENT
Start: 2024-09-20

## 2024-09-20 RX ORDER — DEXTROAMPHETAMINE SACCHARATE, AMPHETAMINE ASPARTATE MONOHYDRATE, DEXTROAMPHETAMINE SULFATE AND AMPHETAMINE SULFATE 5; 5; 5; 5 MG/1; MG/1; MG/1; MG/1
20 CAPSULE, EXTENDED RELEASE ORAL DAILY
Qty: 30 CAPSULE | Refills: 0 | Status: SHIPPED | OUTPATIENT
Start: 2024-09-20

## 2024-09-20 RX ORDER — BUPROPION HYDROCHLORIDE 150 MG/1
TABLET ORAL
Qty: 90 TABLET | Refills: 1 | Status: SHIPPED | OUTPATIENT
Start: 2024-09-20

## 2024-09-20 RX ORDER — BUPROPION HYDROCHLORIDE 300 MG/1
300 TABLET ORAL EVERY MORNING
Qty: 90 TABLET | Refills: 1 | Status: SHIPPED | OUTPATIENT
Start: 2024-09-20

## 2024-10-07 ENCOUNTER — VIRTUAL VISIT (OUTPATIENT)
Dept: FAMILY MEDICINE | Facility: CLINIC | Age: 34
End: 2024-10-07
Payer: COMMERCIAL

## 2024-10-07 DIAGNOSIS — F33.1 MODERATE EPISODE OF RECURRENT MAJOR DEPRESSIVE DISORDER (H): Primary | ICD-10-CM

## 2024-10-07 DIAGNOSIS — F41.1 GAD (GENERALIZED ANXIETY DISORDER): ICD-10-CM

## 2024-10-07 DIAGNOSIS — F90.0 ATTENTION DEFICIT HYPERACTIVITY DISORDER (ADHD), PREDOMINANTLY INATTENTIVE TYPE: ICD-10-CM

## 2024-10-07 PROCEDURE — 99213 OFFICE O/P EST LOW 20 MIN: CPT | Mod: 95 | Performed by: INTERNAL MEDICINE

## 2024-10-07 ASSESSMENT — PATIENT HEALTH QUESTIONNAIRE - PHQ9
SUM OF ALL RESPONSES TO PHQ QUESTIONS 1-9: 8
SUM OF ALL RESPONSES TO PHQ QUESTIONS 1-9: 8
10. IF YOU CHECKED OFF ANY PROBLEMS, HOW DIFFICULT HAVE THESE PROBLEMS MADE IT FOR YOU TO DO YOUR WORK, TAKE CARE OF THINGS AT HOME, OR GET ALONG WITH OTHER PEOPLE: SOMEWHAT DIFFICULT

## 2024-10-07 NOTE — PROGRESS NOTES
Val is a 34 year old who is being evaluated via a billable video visit.    How would you like to obtain your AVS? MyChart  If the video visit is dropped, the invitation should be resent by: Text to cell phone: [phone]  Will anyone else be joining your video visit? No    Assessment & Plan     Moderate episode of recurrent major depressive disorder (H)  - Adult Mental Health  Referral; Future    GORGE (generalized anxiety disorder)  - Adult Mental Health  Referral; Future    Attention deficit hyperactivity disorder (ADHD), predominantly inattentive type  - Adult Mental Health  Referral; Future    She asks to be referred to psychiatry, as we discussed this during previous visits, due to her intolerance to medications for depression and anxiety. Her symptoms were not well controlled on the current regimen, however she is able to feel better now.     See Patient Instructions    Subjective   Val is a 34 year old, presenting for the following health issues:  No chief complaint on file.    HPI     Val Gallagher is here virtually to follow up on medications  She has depression, anxiety and ADD  She is on wellbutrin, lexapro and adderall.  She is doing well now.   She is due for vaccines           Objective       Vitals:  No vitals were obtained today due to virtual visit.    Physical Exam   GEN: No acute distress  RESP: No audible increased work of breathing. Patient speaking in full sentences without distress.  PSYCH: pleasant  Exam otherwise limited due to virtual platform        Video-Visit Details    Type of service:  Video Visit   Originating Location (pt. Location): Home  Distant Location (provider location):  On-site  Platform used for Video Visit: Adalgisa  Signed Electronically by: Millicent Marrero MD

## 2024-10-28 ENCOUNTER — MYC REFILL (OUTPATIENT)
Dept: FAMILY MEDICINE | Facility: CLINIC | Age: 34
End: 2024-10-28
Payer: COMMERCIAL

## 2024-10-28 DIAGNOSIS — F90.0 ATTENTION DEFICIT HYPERACTIVITY DISORDER (ADHD), PREDOMINANTLY INATTENTIVE TYPE: ICD-10-CM

## 2024-10-30 RX ORDER — DEXTROAMPHETAMINE SACCHARATE, AMPHETAMINE ASPARTATE MONOHYDRATE, DEXTROAMPHETAMINE SULFATE AND AMPHETAMINE SULFATE 5; 5; 5; 5 MG/1; MG/1; MG/1; MG/1
20 CAPSULE, EXTENDED RELEASE ORAL DAILY
Qty: 30 CAPSULE | Refills: 0 | Status: SHIPPED | OUTPATIENT
Start: 2024-10-30

## 2024-12-03 ENCOUNTER — MYC REFILL (OUTPATIENT)
Dept: FAMILY MEDICINE | Facility: CLINIC | Age: 34
End: 2024-12-03
Payer: COMMERCIAL

## 2024-12-03 DIAGNOSIS — F90.0 ATTENTION DEFICIT HYPERACTIVITY DISORDER (ADHD), PREDOMINANTLY INATTENTIVE TYPE: ICD-10-CM

## 2024-12-05 RX ORDER — DEXTROAMPHETAMINE SACCHARATE, AMPHETAMINE ASPARTATE MONOHYDRATE, DEXTROAMPHETAMINE SULFATE AND AMPHETAMINE SULFATE 5; 5; 5; 5 MG/1; MG/1; MG/1; MG/1
20 CAPSULE, EXTENDED RELEASE ORAL DAILY
Qty: 30 CAPSULE | Refills: 0 | Status: SHIPPED | OUTPATIENT
Start: 2024-12-05

## 2025-01-09 ENCOUNTER — MYC REFILL (OUTPATIENT)
Dept: FAMILY MEDICINE | Facility: CLINIC | Age: 35
End: 2025-01-09
Payer: COMMERCIAL

## 2025-01-09 DIAGNOSIS — F90.0 ATTENTION DEFICIT HYPERACTIVITY DISORDER (ADHD), PREDOMINANTLY INATTENTIVE TYPE: ICD-10-CM

## 2025-01-12 RX ORDER — DEXTROAMPHETAMINE SACCHARATE, AMPHETAMINE ASPARTATE MONOHYDRATE, DEXTROAMPHETAMINE SULFATE AND AMPHETAMINE SULFATE 5; 5; 5; 5 MG/1; MG/1; MG/1; MG/1
20 CAPSULE, EXTENDED RELEASE ORAL DAILY
Qty: 30 CAPSULE | Refills: 0 | Status: SHIPPED | OUTPATIENT
Start: 2025-01-12

## 2025-02-22 ENCOUNTER — MYC REFILL (OUTPATIENT)
Dept: FAMILY MEDICINE | Facility: CLINIC | Age: 35
End: 2025-02-22
Payer: COMMERCIAL

## 2025-02-22 DIAGNOSIS — F90.0 ATTENTION DEFICIT HYPERACTIVITY DISORDER (ADHD), PREDOMINANTLY INATTENTIVE TYPE: ICD-10-CM

## 2025-02-24 RX ORDER — DEXTROAMPHETAMINE SACCHARATE, AMPHETAMINE ASPARTATE MONOHYDRATE, DEXTROAMPHETAMINE SULFATE AND AMPHETAMINE SULFATE 5; 5; 5; 5 MG/1; MG/1; MG/1; MG/1
20 CAPSULE, EXTENDED RELEASE ORAL DAILY
Qty: 30 CAPSULE | Refills: 0 | Status: SHIPPED | OUTPATIENT
Start: 2025-02-24

## 2025-03-27 ENCOUNTER — MYC REFILL (OUTPATIENT)
Dept: FAMILY MEDICINE | Facility: CLINIC | Age: 35
End: 2025-03-27
Payer: COMMERCIAL

## 2025-03-27 DIAGNOSIS — F90.0 ATTENTION DEFICIT HYPERACTIVITY DISORDER (ADHD), PREDOMINANTLY INATTENTIVE TYPE: ICD-10-CM

## 2025-03-29 RX ORDER — DEXTROAMPHETAMINE SACCHARATE, AMPHETAMINE ASPARTATE MONOHYDRATE, DEXTROAMPHETAMINE SULFATE AND AMPHETAMINE SULFATE 5; 5; 5; 5 MG/1; MG/1; MG/1; MG/1
20 CAPSULE, EXTENDED RELEASE ORAL DAILY
Qty: 30 CAPSULE | Refills: 0 | Status: SHIPPED | OUTPATIENT
Start: 2025-03-29

## 2025-04-02 DIAGNOSIS — F41.9 ANXIETY: ICD-10-CM

## 2025-04-02 DIAGNOSIS — R41.840 POOR CONCENTRATION: ICD-10-CM

## 2025-04-02 RX ORDER — BUPROPION HYDROCHLORIDE 150 MG/1
TABLET ORAL
Qty: 90 TABLET | Refills: 1 | Status: SHIPPED | OUTPATIENT
Start: 2025-04-02

## 2025-04-02 RX ORDER — ESCITALOPRAM OXALATE 20 MG/1
20 TABLET ORAL DAILY
Qty: 90 TABLET | Refills: 1 | Status: SHIPPED | OUTPATIENT
Start: 2025-04-02

## 2025-04-03 RX ORDER — BUPROPION HYDROCHLORIDE 300 MG/1
TABLET ORAL
Qty: 90 TABLET | Refills: 1 | Status: SHIPPED | OUTPATIENT
Start: 2025-04-03

## 2025-04-16 ENCOUNTER — OFFICE VISIT (OUTPATIENT)
Dept: FAMILY MEDICINE | Facility: CLINIC | Age: 35
End: 2025-04-16
Payer: COMMERCIAL

## 2025-04-16 VITALS
SYSTOLIC BLOOD PRESSURE: 123 MMHG | WEIGHT: 190 LBS | HEIGHT: 68 IN | OXYGEN SATURATION: 96 % | TEMPERATURE: 97.8 F | BODY MASS INDEX: 28.79 KG/M2 | HEART RATE: 81 BPM | RESPIRATION RATE: 16 BRPM | DIASTOLIC BLOOD PRESSURE: 80 MMHG

## 2025-04-16 DIAGNOSIS — Z13.220 LIPID SCREENING: ICD-10-CM

## 2025-04-16 DIAGNOSIS — Z80.3 FAMILY HISTORY OF MALIGNANT NEOPLASM OF BREAST: ICD-10-CM

## 2025-04-16 DIAGNOSIS — Z23 NEED FOR COVID-19 VACCINE: ICD-10-CM

## 2025-04-16 DIAGNOSIS — R68.82 LOW LIBIDO: ICD-10-CM

## 2025-04-16 DIAGNOSIS — G47.00 INSOMNIA, UNSPECIFIED TYPE: ICD-10-CM

## 2025-04-16 DIAGNOSIS — F41.9 ANXIETY: ICD-10-CM

## 2025-04-16 DIAGNOSIS — Z00.00 ROUTINE GENERAL MEDICAL EXAMINATION AT A HEALTH CARE FACILITY: Primary | ICD-10-CM

## 2025-04-16 DIAGNOSIS — F90.0 ATTENTION DEFICIT HYPERACTIVITY DISORDER (ADHD), PREDOMINANTLY INATTENTIVE TYPE: ICD-10-CM

## 2025-04-16 DIAGNOSIS — F33.1 MODERATE EPISODE OF RECURRENT MAJOR DEPRESSIVE DISORDER (H): ICD-10-CM

## 2025-04-16 DIAGNOSIS — Z87.898 HX OF BREAST LUMP: ICD-10-CM

## 2025-04-16 RX ORDER — DEXTROAMPHETAMINE SACCHARATE, AMPHETAMINE ASPARTATE, DEXTROAMPHETAMINE SULFATE AND AMPHETAMINE SULFATE 2.5; 2.5; 2.5; 2.5 MG/1; MG/1; MG/1; MG/1
10 TABLET ORAL DAILY
Qty: 30 TABLET | Refills: 0 | Status: SHIPPED | OUTPATIENT
Start: 2025-04-16

## 2025-04-16 RX ORDER — BUPROPION HYDROCHLORIDE 300 MG/1
300 TABLET ORAL EVERY MORNING
Qty: 30 TABLET | Refills: 11 | Status: SHIPPED | OUTPATIENT
Start: 2025-04-16

## 2025-04-16 RX ORDER — TRAZODONE HYDROCHLORIDE 50 MG/1
50 TABLET ORAL AT BEDTIME
Qty: 30 TABLET | Refills: 5 | Status: SHIPPED | OUTPATIENT
Start: 2025-04-16

## 2025-04-16 RX ORDER — BUPROPION HYDROCHLORIDE 150 MG/1
TABLET ORAL
Qty: 30 TABLET | Refills: 11 | Status: SHIPPED | OUTPATIENT
Start: 2025-04-16

## 2025-04-16 RX ORDER — ESCITALOPRAM OXALATE 20 MG/1
20 TABLET ORAL DAILY
Qty: 30 TABLET | Refills: 11 | Status: SHIPPED | OUTPATIENT
Start: 2025-04-16

## 2025-04-16 SDOH — HEALTH STABILITY: PHYSICAL HEALTH: ON AVERAGE, HOW MANY MINUTES DO YOU ENGAGE IN EXERCISE AT THIS LEVEL?: 30 MIN

## 2025-04-16 SDOH — HEALTH STABILITY: PHYSICAL HEALTH: ON AVERAGE, HOW MANY DAYS PER WEEK DO YOU ENGAGE IN MODERATE TO STRENUOUS EXERCISE (LIKE A BRISK WALK)?: 3 DAYS

## 2025-04-16 ASSESSMENT — SOCIAL DETERMINANTS OF HEALTH (SDOH): HOW OFTEN DO YOU GET TOGETHER WITH FRIENDS OR RELATIVES?: ONCE A WEEK

## 2025-04-16 ASSESSMENT — PATIENT HEALTH QUESTIONNAIRE - PHQ9
10. IF YOU CHECKED OFF ANY PROBLEMS, HOW DIFFICULT HAVE THESE PROBLEMS MADE IT FOR YOU TO DO YOUR WORK, TAKE CARE OF THINGS AT HOME, OR GET ALONG WITH OTHER PEOPLE: SOMEWHAT DIFFICULT
SUM OF ALL RESPONSES TO PHQ QUESTIONS 1-9: 6
SUM OF ALL RESPONSES TO PHQ QUESTIONS 1-9: 6

## 2025-04-16 ASSESSMENT — PAIN SCALES - GENERAL: PAINLEVEL_OUTOF10: NO PAIN (0)

## 2025-04-16 NOTE — PROGRESS NOTES
"Preventive Care Visit  [facility] CHUCKIE Ly PA-C, Physician Assistant - Medical  Apr 16, 2025    Assessment & Plan     Routine general medical examination at a health care facility  Will return for lab only when fasting.   - Lipid panel reflex to direct LDL Fasting  - Comprehensive metabolic panel (BMP + Alb, Alk Phos, ALT, AST, Total. Bili, TP)  - CBC with platelets    Lipid screening  - Lipid panel reflex to direct LDL Fasting    Anxiety  Overall well managed.   - escitalopram (LEXAPRO) 20 MG tablet  Dispense: 30 tablet; Refill: 11    Insomnia, unspecified type  Stable.   - traZODone (DESYREL) 50 MG tablet  Dispense: 30 tablet; Refill: 5    Attention deficit hyperactivity disorder (ADHD), predominantly inattentive type  Current dose of adderall XR 20 mg in the AM seems sufficient but losing focus in the afternoon, will add IR 10 mg in the afternoons and follow up in 1-2 months.   - amphetamine-dextroamphetamine (ADDERALL) 10 MG tablet  Dispense: 30 tablet; Refill: 0    Moderate episode of recurrent major depressive disorder (H)  Low libido  Stable on current regimen.   - buPROPion (WELLBUTRIN XL) 150 MG 24 hr tablet  Dispense: 30 tablet; Refill: 11  - buPROPion (WELLBUTRIN XL) 300 MG 24 hr tablet  Dispense: 30 tablet; Refill: 11    Need for COVID-19 vaccine  - COVID-19 12+ (PFIZER)    Hx of breast lump  Family history of malignant neoplasm of breast  Lumpiness of both breasts on exam, no discrete mass. Had mammo and US ordered last year and never done. Maternal grandmother had breast cancer. Will order mammo and US for further evaluation.  - US Breast Bilateral Limited 1-3 Quadrants  - MA Diagnostic Bilateral w/ Viktor    BMI  Estimated body mass index is 28.8 kg/m  as calculated from the following:    Height as of this encounter: 1.73 m (5' 8.11\").    Weight as of this encounter: 86.2 kg (190 lb).       Counseling  Appropriate preventive services were addressed with this patient " via screening, questionnaire, or discussion as appropriate for fall prevention, nutrition, physical activity, Tobacco-use cessation, social engagement, weight loss and cognition.  Checklist reviewing preventive services available has been given to the patient.  Reviewed patient's diet, addressing concerns and/or questions.   She is at risk for lack of exercise and has been provided with information to increase physical activity for the benefit of her well-being.   The patient was instructed to see the dentist every 6 months.   She is at risk for psychosocial distress and has been provided with information to reduce risk.   The patient's PHQ-9 score is consistent with mild depression. She was provided with information regarding depression.       Farzaneh Ly PA-C on 4/16/2025 at 1:07 PM      Devang Neal is a 34 year old, presenting for the following:  Physical        4/16/2025    12:57 PM   Additional Questions   Roomed by Mark Anthony         4/16/2025   Declines Weight   Did patient decline having their weight taken? Yes          HPI    - pap - negative cotesting 3/2023, repeat 5 years     Mental health  History of GORGE, MDD, ADHD and OCD  Currently on wellbutrin 450 mg (4.5 years - added to lexapro for low libido, helpful), lexapro 20 mg (5 years) and adderall XR 20 mg daily (2.5 years)  Uses trazodone as needed  In grad school and works full time - really busy  Anxiety and depression feel well managed  Focus is lacking - feels XR dose is helpful for morning but loses focus around 2 pm. Really impacting work and ability to get homework done after work     Advance Care Planning  Discussed advance care planning with patient; however, patient declined at this time.        4/16/2025   General Health   How would you rate your overall physical health? (!) FAIR   Feel stress (tense, anxious, or unable to sleep) Very much   (!) STRESS CONCERN      4/16/2025   Nutrition   Three or more servings of calcium  each day? Yes   Diet: Regular (no restrictions)   How many servings of fruit and vegetables per day? (!) 2-3   How many sweetened beverages each day? 0-1         2025   Exercise   Days per week of moderate/strenous exercise 3 days   Average minutes spent exercising at this level 30 min         2025   Social Factors   Frequency of gathering with friends or relatives Once a week   Worry food won't last until get money to buy more No   Food not last or not have enough money for food? No   Do you have housing? (Housing is defined as stable permanent housing and does not include staying ouside in a car, in a tent, in an abandoned building, in an overnight shelter, or couch-surfing.) Yes   Are you worried about losing your housing? No   Lack of transportation? No   Unable to get utilities (heat,electricity)? No         2025   Dental   Dentist two times every year? (!) NO       Today's PHQ-9 Score:       2025     8:39 AM   PHQ-9 SCORE   PHQ-9 Total Score MyChart 6 (Mild depression)   PHQ-9 Total Score 6        Patient-reported         2025   Substance Use   Alcohol more than 3/day or more than 7/wk No   Do you use any other substances recreationally? (!) CANNABIS PRODUCTS     Social History     Tobacco Use    Smoking status: Former     Current packs/day: 0.00     Types: Cigarettes, Hookah     Start date: 2011     Quit date: 2016     Years since quittin.7     Passive exposure: Past    Smokeless tobacco: Never    Tobacco comments:     I smoked about a pack a month.   Vaping Use    Vaping status: Some Days    Substances: Flavoring    Devices: Disposable   Substance Use Topics    Alcohol use: Yes     Comment: Alcohol use 1-2x per week    Drug use: No         3/6/2023   LAST FHS-7 RESULTS   1st degree relative breast or ovarian cancer No   Any relative bilateral breast cancer Unknown   Any male have breast cancer No   Any ONE woman have BOTH breast AND ovarian cancer Yes   Any woman with  "breast cancer before 50yrs No   2 or more relatives with breast AND/OR ovarian cancer Yes   2 or more relatives with breast AND/OR bowel cancer No     Mammogram Screening - Patient under 40 years of age: Routine Mammogram Screening not recommended.         4/16/2025   STI Screening   New sexual partner(s) since last STI/HIV test? No     History of abnormal Pap smear: No - age 30- 64 PAP with HPV every 5 years recommended        Latest Ref Rng & Units 3/6/2023     1:16 PM 1/24/2020     9:09 AM 5/2/2017     8:31 AM   PAP / HPV   PAP  Negative for Intraepithelial Lesion or Malignancy (NILM)      PAP (Historical)   NIL  NIL    HPV 16 DNA Negative Negative      HPV 18 DNA Negative Negative      Other HR HPV Negative Negative              4/16/2025   Contraception/Family Planning   Questions about contraception or family planning No     Reviewed and updated as needed this visit by Provider   Tobacco  Allergies  Meds  Problems  Med Hx  Surg Hx  Fam Hx            Past Medical History:   Diagnosis Date    LSIL (low grade squamous intraepithelial lesion) on Pap smear 8/2013    age 22    Other kyphoscoliosis and scoliosis     thoracolumbar     Past Surgical History:   Procedure Laterality Date    NO HISTORY OF SURGERY           Review of Systems  Constitutional, HEENT, cardiovascular, pulmonary, GI, , musculoskeletal, neuro, skin, endocrine and psych systems are negative, except as otherwise noted.     Objective    Exam  /80 (BP Location: Left arm, Patient Position: Sitting, Cuff Size: Adult Large)   Pulse 81   Temp 97.8  F (36.6  C) (Temporal)   Resp 16   Ht 1.73 m (5' 8.11\")   Wt 86.2 kg (190 lb)   LMP 04/05/2025 (Exact Date)   SpO2 96%   Breastfeeding No   BMI 28.80 kg/m     Estimated body mass index is 28.8 kg/m  as calculated from the following:    Height as of this encounter: 1.73 m (5' 8.11\").    Weight as of this encounter: 86.2 kg (190 lb).    Physical Exam  GENERAL: alert and no " distress  EYES: Eyes grossly normal to inspection, PERRL and conjunctivae and sclerae normal  HENT: ear canals and TM's normal, nose and mouth without ulcers or lesions  NECK: no adenopathy, no asymmetry, masses, or scars  RESP: lungs clear to auscultation - no rales, rhonchi or wheezes  BREAST: lumpiness of both breasts, no skin changes, lymphadenopathy or nipple abnormalities  CV: regular rate and rhythm, normal S1 S2, no S3 or S4, no murmur, click or rub, no peripheral edema  ABDOMEN: soft, nontender, no hepatosplenomegaly, no masses and bowel sounds normal  MS: no gross musculoskeletal defects noted, no edema  SKIN: no suspicious lesions or rashes  NEURO: Normal strength and tone, mentation intact and speech normal  PSYCH: mentation appears normal, affect normal/bright      Signed Electronically by: Farzaneh Ly PA-C      Answers submitted by the patient for this visit:  Patient Health Questionnaire (Submitted on 4/16/2025)  If you checked off any problems, how difficult have these problems made it for you to do your work, take care of things at home, or get along with other people?: Somewhat difficult  PHQ9 TOTAL SCORE: 6

## 2025-04-16 NOTE — PATIENT INSTRUCTIONS
Patient Education   Preventive Care Advice   This is general advice given by our system to help you stay healthy. However, your care team may have specific advice just for you. Please talk to your care team about your preventive care needs.  Nutrition  Eat 5 or more servings of fruits and vegetables each day.  Try wheat bread, brown rice and whole grain pasta (instead of white bread, rice, and pasta).  Get enough calcium and vitamin D. Check the label on foods and aim for 100% of the RDA (recommended daily allowance).  Lifestyle  Exercise at least 150 minutes each week  (30 minutes a day, 5 days a week).  Do muscle strengthening activities 2 days a week. These help control your weight and prevent disease.  No smoking.  Wear sunscreen to prevent skin cancer.  Have a dental exam and cleaning every 6 months.  Yearly exams  See your health care team every year to talk about:  Any changes in your health.  Any medicines your care team has prescribed.  Preventive care, family planning, and ways to prevent chronic diseases.  Shots (vaccines)   HPV shots (up to age 26), if you've never had them before.  Hepatitis B shots (up to age 59), if you've never had them before.  COVID-19 shot: Get this shot when it's due.  Flu shot: Get a flu shot every year.  Tetanus shot: Get a tetanus shot every 10 years.  Pneumococcal, hepatitis A, and RSV shots: Ask your care team if you need these based on your risk.  Shingles shot (for age 50 and up)  General health tests  Diabetes screening:  Starting at age 35, Get screened for diabetes at least every 3 years.  If you are younger than age 35, ask your care team if you should be screened for diabetes.  Cholesterol test: At age 39, start having a cholesterol test every 5 years, or more often if advised.  Bone density scan (DEXA): At age 50, ask your care team if you should have this scan for osteoporosis (brittle bones).  Hepatitis C: Get tested at least once in your life.  STIs (sexually  transmitted infections)  Before age 24: Ask your care team if you should be screened for STIs.  After age 24: Get screened for STIs if you're at risk. You are at risk for STIs (including HIV) if:  You are sexually active with more than one person.  You don't use condoms every time.  You or a partner was diagnosed with a sexually transmitted infection.  If you are at risk for HIV, ask about PrEP medicine to prevent HIV.  Get tested for HIV at least once in your life, whether you are at risk for HIV or not.  Cancer screening tests  Cervical cancer screening: If you have a cervix, begin getting regular cervical cancer screening tests starting at age 21.  Breast cancer scan (mammogram): If you've ever had breasts, begin having regular mammograms starting at age 40. This is a scan to check for breast cancer.  Colon cancer screening: It is important to start screening for colon cancer at age 45.  Have a colonoscopy test every 10 years (or more often if you're at risk) Or, ask your provider about stool tests like a FIT test every year or Cologuard test every 3 years.  To learn more about your testing options, visit:   .  For help making a decision, visit:   https://bit.ly/zg27582.  Prostate cancer screening test: If you have a prostate, ask your care team if a prostate cancer screening test (PSA) at age 55 is right for you.  Lung cancer screening: If you are a current or former smoker ages 50 to 80, ask your care team if ongoing lung cancer screenings are right for you.  For informational purposes only. Not to replace the advice of your health care provider. Copyright   2023 [facility]. All rights reserved. Clinically reviewed by the [facility]. [employer] 538271 - REV 01/24.  Learning About Stress  What is stress?     Stress is your body's response to a hard situation. Your body can have a physical, emotional, or mental response. Stress is a fact of life for most people, and it  affects everyone differently. What causes stress for you may not be stressful for someone else.  A lot of things can cause stress. You may feel stress when you go on a job interview, take a test, or run a race. This kind of short-term stress is normal and even useful. It can help you if you need to work hard or react quickly. For example, stress can help you finish an important job on time.  Long-term stress is caused by ongoing stressful situations or events. Examples of long-term stress include long-term health problems, ongoing problems at work, or conflicts in your family. Long-term stress can harm your health.  How does stress affect your health?  When you are stressed, your body responds as though you are in danger. It makes hormones that speed up your heart, make you breathe faster, and give you a burst of energy. This is called the fight-or-flight stress response. If the stress is over quickly, your body goes back to normal and no harm is done.  But if stress happens too often or lasts too long, it can have bad effects. Long-term stress can make you more likely to get sick, and it can make symptoms of some diseases worse. If you tense up when you are stressed, you may develop neck, shoulder, or low back pain. Stress is linked to high blood pressure and heart disease.  Stress also harms your emotional health. It can make you phillips, tense, or depressed. Your relationships may suffer, and you may not do well at work or school.  What can you do to manage stress?  You can try these things to help manage stress:   Do something active. Exercise or activity can help reduce stress. Walking is a great way to get started. Even everyday activities such as housecleaning or yard work can help.  Try yoga or nat chi. These techniques combine exercise and meditation. You may need some training at first to learn them.  Do something you enjoy. For example, listen to music or go to a movie. Practice your hobby or do volunteer  "work.  Meditate. This can help you relax, because you are not worrying about what happened before or what may happen in the future.  Do guided imagery. Imagine yourself in any setting that helps you feel calm. You can use online videos, books, or a teacher to guide you.  Do breathing exercises. For example:  From a standing position, bend forward from the waist with your knees slightly bent. Let your arms dangle close to the floor.  Breathe in slowly and deeply as you return to a standing position. Roll up slowly and lift your head last.  Hold your breath for just a few seconds in the standing position.  Breathe out slowly and bend forward from the waist.  Let your feelings out. Talk, laugh, cry, and express anger when you need to. Talking with supportive friends or family, a counselor, or a aggie leader about your feelings is a healthy way to relieve stress. Avoid discussing your feelings with people who make you feel worse.  Write. It may help to write about things that are bothering you. This helps you find out how much stress you feel and what is causing it. When you know this, you can find better ways to cope.  What can you do to prevent stress?  You might try some of these things to help prevent stress:  Manage your time. This helps you find time to do the things you want and need to do.  Get enough sleep. Your body recovers from the stresses of the day while you are sleeping.  Get support. Your family, friends, and community can make a difference in how you experience stress.  Limit your news feed. Avoid or limit time on social media or news that may make you feel stressed.  Do something active. Exercise or activity can help reduce stress. Walking is a great way to get started.  Where can you learn more?  Go to https://www.[employer]ick[employer].net/patiented  Enter N032 in the search box to learn more about \"Learning About Stress.\"  Current as of: October 24, 2024  Content Version: 14.4 2024-2025 [employer].   Care instructions adapted under license by your healthcare professional. If you have questions about a medical condition or this instruction, always ask your healthcare professional. [employer] disclaims any warranty or liability for your use of this information.    Learning About Depression Screening  What is depression screening?  Depression screening is a way to see if you have depression symptoms. It may be done by a doctor or counselor. It's often part of a routine checkup. That's because your mental health is just as important as your physical health.  Depression is a mental health condition that affects how you feel, think, and act. You may:  Have less energy.  Lose interest in your daily activities.  Feel sad and grouchy for a long time.  Depression is very common. It affects people of all ages.  Many things can lead to depression. Some people become depressed after they have a stroke or find out they have a major illness like cancer or heart disease. The death of a loved one or a breakup may lead to depression. It can run in families. Most experts believe that a combination of inherited genes and stressful life events can cause it.  What happens during screening?  You may be asked to fill out a form about your depression symptoms. You and the doctor will discuss your answers. The doctor may ask you more questions to learn more about how you think, act, and feel.  What happens after screening?  If you have symptoms of depression, your doctor will talk to you about your options.  Doctors usually treat depression with medicines or counseling. Often, combining the two works best. Many people don't get help because they think that they'll get over the depression on their own. But people with depression may not get better unless they get treatment.  The cause of depression is not well understood. There may be many factors involved. But if you have depression, it's not your fault.  A serious  "symptom of depression is thinking about death or suicide. If you or someone you care about talks about this or about feeling hopeless, get help right away.  It's important to know that depression can be treated. Medicine, counseling, and self-care may help.  Where can you learn more?  Go to https://www.[employer].net/patiented  Enter T185 in the search box to learn more about \"Learning About Depression Screening.\"  Current as of: July 31, 2024  Content Version: 14.4    6598-3131 [employer].   Care instructions adapted under license by your healthcare professional. If you have questions about a medical condition or this instruction, always ask your healthcare professional. [employer] disclaims any warranty or liability for your use of this information.    Substance Use Disorder: Care Instructions  Overview     You can improve your life and health by stopping your use of alcohol or drugs. When you don't drink or use drugs, you may feel and sleep better. You may get along better with your family, friends, and coworkers. There are medicines and programs that can help with substance use disorder.  How can you care for yourself at home?  Here are some ways to help you stay sober and prevent relapse.  If you have been given medicine to help keep you sober or reduce your cravings, be sure to take it exactly as prescribed.  Talk to your doctor about programs that can help you stop using drugs or drinking alcohol.  Do not keep alcohol or drugs in your home.  Plan ahead. Think about what you'll say if other people ask you to drink or use drugs. Try not to spend time with people who drink or use drugs.  Use the time and money spent on drinking or drugs to do something that's important to you.  Preventing a relapse  Have a plan to deal with relapse. Learn to recognize changes in your thinking that lead you to drink or use drugs. Get help before you start to drink or use drugs again.  Try to stay away from " situations, friends, or places that may lead you to drink or use drugs.  If you feel the need to drink alcohol or use drugs again, seek help right away. Call a trusted friend or family member. Some people get support from organizations such as Narcotics Anonymous or [employer] or from treatment facilities.  If you relapse, get help as soon as you can. Some people make a plan with another person that outlines what they want that person to do for them if they relapse. The plan usually includes how to handle the relapse and who to notify in case of relapse.  Don't give up. Remember that a relapse doesn't mean that you have failed. Use the experience to learn the triggers that lead you to drink or use drugs. Then quit again. Recovery is a lifelong process. Many people have several relapses before they are able to quit for good.  Follow-up care is a key part of your treatment and safety. Be sure to make and go to all appointments, and call your doctor if you are having problems. It's also a good idea to know your test results and keep a list of the medicines you take.  When should you call for help?   Call 051  anytime you think you may need emergency care. For example, call if you or someone else:    Has overdosed or has withdrawal signs. Be sure to tell the emergency workers that you are or someone else is using or trying to quit using drugs. Overdose or withdrawal signs may include:  Losing consciousness.  Seizure.  Seeing or hearing things that aren't there (hallucinations).     Is thinking or talking about suicide or harming others.   Where to get help 24 hours a day, 7 days a week   If you or someone you know talks about suicide, self-harm, a mental health crisis, a substance use crisis, or any other kind of emotional distress, get help right away. You can:    Call the Suicide and Crisis Lifeline at 544.     Call 2-103-409-TALK (1-584.491.8155).     Text HOME to 752645 to access the Crisis Text Line.   Consider  "saving these numbers in your phone.  Go to [employer] for more information or to chat online.  Call your doctor now or seek immediate medical care if:    You are having withdrawal symptoms. These may include nausea or vomiting, sweating, shakiness, and anxiety.   Watch closely for changes in your health, and be sure to contact your doctor if:    You have a relapse.     You need more help or support to stop.   Where can you learn more?  Go to https://www.[employer]las.net/patiented  Enter H573 in the search box to learn more about \"Substance Use Disorder: Care Instructions.\"  Current as of: August 20, 2024  Content Version: 14.4    4048-7931 [employer].   Care instructions adapted under license by your healthcare professional. If you have questions about a medical condition or this instruction, always ask your healthcare professional. [employer] disclaims any warranty or liability for your use of this information.       "

## 2025-04-21 ENCOUNTER — PATIENT OUTREACH (OUTPATIENT)
Dept: CARE COORDINATION | Facility: CLINIC | Age: 35
End: 2025-04-21
Payer: COMMERCIAL

## 2025-05-05 ENCOUNTER — MYC REFILL (OUTPATIENT)
Dept: FAMILY MEDICINE | Facility: CLINIC | Age: 35
End: 2025-05-05
Payer: COMMERCIAL

## 2025-05-05 DIAGNOSIS — F90.0 ATTENTION DEFICIT HYPERACTIVITY DISORDER (ADHD), PREDOMINANTLY INATTENTIVE TYPE: ICD-10-CM

## 2025-05-06 RX ORDER — DEXTROAMPHETAMINE SACCHARATE, AMPHETAMINE ASPARTATE MONOHYDRATE, DEXTROAMPHETAMINE SULFATE AND AMPHETAMINE SULFATE 5; 5; 5; 5 MG/1; MG/1; MG/1; MG/1
20 CAPSULE, EXTENDED RELEASE ORAL DAILY
Qty: 30 CAPSULE | Refills: 0 | Status: SHIPPED | OUTPATIENT
Start: 2025-05-06

## 2025-06-10 ENCOUNTER — MYC MEDICAL ADVICE (OUTPATIENT)
Dept: FAMILY MEDICINE | Facility: CLINIC | Age: 35
End: 2025-06-10
Payer: COMMERCIAL

## 2025-07-15 ENCOUNTER — MYC REFILL (OUTPATIENT)
Dept: FAMILY MEDICINE | Facility: CLINIC | Age: 35
End: 2025-07-15
Payer: COMMERCIAL

## 2025-07-15 DIAGNOSIS — F90.0 ATTENTION DEFICIT HYPERACTIVITY DISORDER (ADHD), PREDOMINANTLY INATTENTIVE TYPE: ICD-10-CM

## 2025-07-15 RX ORDER — DEXTROAMPHETAMINE SACCHARATE, AMPHETAMINE ASPARTATE MONOHYDRATE, DEXTROAMPHETAMINE SULFATE AND AMPHETAMINE SULFATE 5; 5; 5; 5 MG/1; MG/1; MG/1; MG/1
20 CAPSULE, EXTENDED RELEASE ORAL DAILY
Qty: 30 CAPSULE | Refills: 0 | Status: SHIPPED | OUTPATIENT
Start: 2025-07-16

## 2025-07-15 RX ORDER — DEXTROAMPHETAMINE SACCHARATE, AMPHETAMINE ASPARTATE, DEXTROAMPHETAMINE SULFATE AND AMPHETAMINE SULFATE 2.5; 2.5; 2.5; 2.5 MG/1; MG/1; MG/1; MG/1
10 TABLET ORAL DAILY
Qty: 30 TABLET | Refills: 0 | Status: SHIPPED | OUTPATIENT
Start: 2025-07-16

## 2025-08-18 ENCOUNTER — MYC REFILL (OUTPATIENT)
Dept: FAMILY MEDICINE | Facility: CLINIC | Age: 35
End: 2025-08-18
Payer: COMMERCIAL

## 2025-08-18 DIAGNOSIS — F90.0 ATTENTION DEFICIT HYPERACTIVITY DISORDER (ADHD), PREDOMINANTLY INATTENTIVE TYPE: ICD-10-CM

## 2025-08-18 RX ORDER — DEXTROAMPHETAMINE SACCHARATE, AMPHETAMINE ASPARTATE, DEXTROAMPHETAMINE SULFATE AND AMPHETAMINE SULFATE 2.5; 2.5; 2.5; 2.5 MG/1; MG/1; MG/1; MG/1
10 TABLET ORAL DAILY
Qty: 30 TABLET | Refills: 0 | Status: SHIPPED | OUTPATIENT
Start: 2025-08-18

## 2025-08-18 RX ORDER — DEXTROAMPHETAMINE SACCHARATE, AMPHETAMINE ASPARTATE MONOHYDRATE, DEXTROAMPHETAMINE SULFATE AND AMPHETAMINE SULFATE 5; 5; 5; 5 MG/1; MG/1; MG/1; MG/1
20 CAPSULE, EXTENDED RELEASE ORAL DAILY
Qty: 30 CAPSULE | Refills: 0 | Status: SHIPPED | OUTPATIENT
Start: 2025-08-18